# Patient Record
Sex: MALE | Race: ASIAN | NOT HISPANIC OR LATINO | URBAN - METROPOLITAN AREA
[De-identification: names, ages, dates, MRNs, and addresses within clinical notes are randomized per-mention and may not be internally consistent; named-entity substitution may affect disease eponyms.]

---

## 2019-09-30 ENCOUNTER — INPATIENT (INPATIENT)
Facility: HOSPITAL | Age: 44
LOS: 9 days | Discharge: HOME | DRG: 854 | End: 2019-10-10
Attending: HOSPITALIST | Admitting: STUDENT IN AN ORGANIZED HEALTH CARE EDUCATION/TRAINING PROGRAM
Payer: COMMERCIAL

## 2019-09-30 VITALS
HEART RATE: 118 BPM | RESPIRATION RATE: 18 BRPM | WEIGHT: 175.05 LBS | TEMPERATURE: 101 F | SYSTOLIC BLOOD PRESSURE: 181 MMHG | HEIGHT: 70 IN | DIASTOLIC BLOOD PRESSURE: 131 MMHG | OXYGEN SATURATION: 98 %

## 2019-09-30 DIAGNOSIS — L03.90 CELLULITIS, UNSPECIFIED: ICD-10-CM

## 2019-09-30 LAB
ALBUMIN SERPL ELPH-MCNC: 4.6 G/DL — SIGNIFICANT CHANGE UP (ref 3.3–5)
ALP SERPL-CCNC: 76 U/L — SIGNIFICANT CHANGE UP (ref 40–120)
ALT FLD-CCNC: 21 U/L — SIGNIFICANT CHANGE UP (ref 10–45)
ANION GAP SERPL CALC-SCNC: 13 MMOL/L — SIGNIFICANT CHANGE UP (ref 5–17)
APPEARANCE UR: CLEAR — SIGNIFICANT CHANGE UP
APTT BLD: 28.6 SEC — SIGNIFICANT CHANGE UP (ref 27.5–36.3)
AST SERPL-CCNC: 26 U/L — SIGNIFICANT CHANGE UP (ref 10–40)
BASOPHILS # BLD AUTO: 0.1 K/UL — SIGNIFICANT CHANGE UP (ref 0–0.2)
BASOPHILS NFR BLD AUTO: 0 % — SIGNIFICANT CHANGE UP (ref 0–2)
BILIRUB SERPL-MCNC: 1.3 MG/DL — HIGH (ref 0.2–1.2)
BILIRUB UR-MCNC: NEGATIVE — SIGNIFICANT CHANGE UP
BUN SERPL-MCNC: 12 MG/DL — SIGNIFICANT CHANGE UP (ref 7–23)
CALCIUM SERPL-MCNC: 9.9 MG/DL — SIGNIFICANT CHANGE UP (ref 8.4–10.5)
CHLORIDE SERPL-SCNC: 97 MMOL/L — SIGNIFICANT CHANGE UP (ref 96–108)
CO2 SERPL-SCNC: 22 MMOL/L — SIGNIFICANT CHANGE UP (ref 22–31)
COLOR SPEC: YELLOW — SIGNIFICANT CHANGE UP
CREAT SERPL-MCNC: 1.1 MG/DL — SIGNIFICANT CHANGE UP (ref 0.5–1.3)
DIFF PNL FLD: ABNORMAL
EOSINOPHIL # BLD AUTO: 0 K/UL — SIGNIFICANT CHANGE UP (ref 0–0.5)
EOSINOPHIL NFR BLD AUTO: 0 % — SIGNIFICANT CHANGE UP (ref 0–6)
GAS PNL BLDV: SIGNIFICANT CHANGE UP
GLUCOSE SERPL-MCNC: 117 MG/DL — HIGH (ref 70–99)
GLUCOSE UR QL: NEGATIVE — SIGNIFICANT CHANGE UP
HCT VFR BLD CALC: 41.9 % — SIGNIFICANT CHANGE UP (ref 39–50)
HGB BLD-MCNC: 13.4 G/DL — SIGNIFICANT CHANGE UP (ref 13–17)
INR BLD: 1.24 RATIO — HIGH (ref 0.88–1.16)
KETONES UR-MCNC: NEGATIVE — SIGNIFICANT CHANGE UP
LEUKOCYTE ESTERASE UR-ACNC: NEGATIVE — SIGNIFICANT CHANGE UP
LYMPHOCYTES # BLD AUTO: 0.7 K/UL — LOW (ref 1–3.3)
LYMPHOCYTES # BLD AUTO: 1 % — LOW (ref 13–44)
MCHC RBC-ENTMCNC: 19.7 PG — LOW (ref 27–34)
MCHC RBC-ENTMCNC: 31.9 GM/DL — LOW (ref 32–36)
MCV RBC AUTO: 61.9 FL — LOW (ref 80–100)
MONOCYTES # BLD AUTO: 0.4 K/UL — SIGNIFICANT CHANGE UP (ref 0–0.9)
MONOCYTES NFR BLD AUTO: 1 % — LOW (ref 2–14)
NEUTROPHILS # BLD AUTO: 21.3 K/UL — HIGH (ref 1.8–7.4)
NEUTROPHILS NFR BLD AUTO: 94 % — HIGH (ref 43–77)
NITRITE UR-MCNC: NEGATIVE — SIGNIFICANT CHANGE UP
PH UR: 7 — SIGNIFICANT CHANGE UP (ref 5–8)
PLATELET # BLD AUTO: 216 K/UL — SIGNIFICANT CHANGE UP (ref 150–400)
POTASSIUM SERPL-MCNC: 4.1 MMOL/L — SIGNIFICANT CHANGE UP (ref 3.5–5.3)
POTASSIUM SERPL-SCNC: 4.1 MMOL/L — SIGNIFICANT CHANGE UP (ref 3.5–5.3)
PROT SERPL-MCNC: 9.1 G/DL — HIGH (ref 6–8.3)
PROT UR-MCNC: ABNORMAL
PROTHROM AB SERPL-ACNC: 14.3 SEC — HIGH (ref 10–12.9)
RBC # BLD: 6.77 M/UL — HIGH (ref 4.2–5.8)
RBC # FLD: 14.5 % — SIGNIFICANT CHANGE UP (ref 10.3–14.5)
SODIUM SERPL-SCNC: 132 MMOL/L — LOW (ref 135–145)
SP GR SPEC: 1.02 — SIGNIFICANT CHANGE UP (ref 1.01–1.02)
UROBILINOGEN FLD QL: ABNORMAL
WBC # BLD: 22.5 K/UL — HIGH (ref 3.8–10.5)
WBC # FLD AUTO: 22.5 K/UL — HIGH (ref 3.8–10.5)

## 2019-09-30 PROCEDURE — 70487 CT MAXILLOFACIAL W/DYE: CPT | Mod: 26

## 2019-09-30 PROCEDURE — 93010 ELECTROCARDIOGRAM REPORT: CPT

## 2019-09-30 PROCEDURE — 99285 EMERGENCY DEPT VISIT HI MDM: CPT

## 2019-09-30 RX ORDER — VANCOMYCIN HCL 1 G
1000 VIAL (EA) INTRAVENOUS ONCE
Refills: 0 | Status: COMPLETED | OUTPATIENT
Start: 2019-09-30 | End: 2019-09-30

## 2019-09-30 RX ORDER — SODIUM CHLORIDE 9 MG/ML
1000 INJECTION INTRAMUSCULAR; INTRAVENOUS; SUBCUTANEOUS ONCE
Refills: 0 | Status: COMPLETED | OUTPATIENT
Start: 2019-09-30 | End: 2019-09-30

## 2019-09-30 RX ORDER — ACETAMINOPHEN 500 MG
975 TABLET ORAL ONCE
Refills: 0 | Status: COMPLETED | OUTPATIENT
Start: 2019-09-30 | End: 2019-09-30

## 2019-09-30 RX ADMIN — Medication 975 MILLIGRAM(S): at 20:13

## 2019-09-30 RX ADMIN — SODIUM CHLORIDE 1000 MILLILITER(S): 9 INJECTION INTRAMUSCULAR; INTRAVENOUS; SUBCUTANEOUS at 22:03

## 2019-09-30 RX ADMIN — SODIUM CHLORIDE 1000 MILLILITER(S): 9 INJECTION INTRAMUSCULAR; INTRAVENOUS; SUBCUTANEOUS at 21:05

## 2019-09-30 RX ADMIN — SODIUM CHLORIDE 1000 MILLILITER(S): 9 INJECTION INTRAMUSCULAR; INTRAVENOUS; SUBCUTANEOUS at 23:00

## 2019-09-30 RX ADMIN — Medication 1000 MILLIGRAM(S): at 23:00

## 2019-09-30 RX ADMIN — SODIUM CHLORIDE 1000 MILLILITER(S): 9 INJECTION INTRAMUSCULAR; INTRAVENOUS; SUBCUTANEOUS at 20:14

## 2019-09-30 RX ADMIN — Medication 975 MILLIGRAM(S): at 20:00

## 2019-09-30 RX ADMIN — Medication 250 MILLIGRAM(S): at 22:05

## 2019-09-30 NOTE — ED ADULT TRIAGE NOTE - CHIEF COMPLAINT QUOTE
upper lip swelling since this morning that has gotten progressively worse. Sent in by urgent care for possible infection.

## 2019-09-30 NOTE — ED PROVIDER NOTE - CLINICAL SUMMARY MEDICAL DECISION MAKING FREE TEXT BOX
Left upper lip swelling tenderness concerns for abscess., ct, labs, culures, reassess  Didier Nicholas MD, Facep Left upper lip swelling tenderness concerns for abscess., ct, labs, cultures, reassess  Didier Nicholas MD, Facep

## 2019-09-30 NOTE — ED PROVIDER NOTE - PHYSICAL EXAMINATION
Gen: Well appearing, NAD  Head: NCAT  HEENT: PERRL, MMM, normal conjunctiva, anicteric, neck supple, oropharynx clear  Lung: CTAB, no adventitious sounds  CV: RRR, no murmurs  Abd: soft, NTND, no rebound or guarding, no CVAT  MSK: No edema, no visible deformities  Neuro: Moving all extremity grossly  Skin: L upper lip significantly swollen, indurated, with appreciable fluctuance not tracking into face or rest of oropharynx  Psych: normal mood and affect

## 2019-09-30 NOTE — ED PROVIDER NOTE - ATTENDING CONTRIBUTION TO CARE
Private Physician Miguel Angel Zacarias Stockwell,  not staff  44y male pmh Hypertension,HLD,no dm,habits,cancer,recent travel. Pt comes to ed complains of swelling to left upper lip, "I thought it was a mouth sore:" today worsened and had tactile temp and chills. Seen urgent care and referred to ed. No stridor, Pe well developed and well nourished male with left upper lip swelling tenderness/firm, chest clear anterior & posterior abd soft +bs no mass guarding. CV no rubs, gallops or murmurs, neruo no focal defects.   Didier Nicholas MD, Facep

## 2019-09-30 NOTE — CHART NOTE - NSCHARTNOTEFT_GEN_A_CORE
Zoya Delarosa PGY-2  MAR  Dept. Internal Medicine    TO BE COMPLETED WITHIN 6 HOURS OF INITIAL ASSESSMENT:    For use in patients that have 2 sepsis criteria and new organ dysfunction   •	New or increased oxygen requirement  •	Creatinine >2mg/dL  •	Bilirubin>2mg/dL  •	Platelet <100,00/mm3  •	INR >1.5, PTT>60  •	Lactate >2    If patient persistent hypotension (SBP<90) or any lactate >4 then provider evaluation (Physician/PA/NP) within 30 minutes of bolus completion is required.    Vital Signs Last 24 Hrs  T(C): 36.9 (30 Sep 2019 20:17), Max: 38.5 (30 Sep 2019 17:03)  T(F): 98.5 (30 Sep 2019 20:17), Max: 101.3 (30 Sep 2019 17:03)  HR: 107 (30 Sep 2019 20:17) (107 - 118)  BP: 177/112 (30 Sep 2019 20:17) (169/121 - 181/131)  RR: 17 (30 Sep 2019 20:17) (16 - 18)  SpO2: 100% (30 Sep 2019 20:17) (98% - 100%)  		  LUNGS:  [x  ]Clear bilaterally [  ] Wheeze [  ] Rhonchi [  ] Rales [  ] Crackles; Other:  HEART: [  ]RRR [  ] No murmur[ x ]  Normal S1S2[ x ] Tachycardia;  Other:  CAPILLARY REFULL:  	Fingers: [  x] less than 2 seconds [  ] more than 2 seconds                                           Toes: [x  ]  less than 2 seconds [  ] more than 2 seconds   PERIPHERAL PULSES:  Radial: [ x ] Palpable  [  ]  non-palpable                                         Dorsalis Pedis: [ x ] Palpable  [  ] non-palpable                                         Posterior Tibial: [ x ] Palpable  [  ] non-palpable                                          Other:  SKIN:   [  ]  Diaphoretic  [  ]  mottling  [  ]  Cold extremities  [x  ]  Warm [ x ]  Dry                      Other:    BEDSIDE ULTRASOUND FINDINGS (IF APPLICABLE):    Labs:  30 Sep 2019 19:20    132    |  97     |  12     ----------------------------<  117    4.1     |  22     |  1.10     Ca    9.9        30 Sep 2019 19:20    TPro  9.1    /  Alb  4.6    /  TBili  1.3    /  DBili  x      /  AST  26     /  ALT  21     /  AlkPhos  76     30 Sep 2019 19:20                          13.4   22.5  )-----------( 216      ( 30 Sep 2019 19:20 )             41.9     PT/INR - ( 30 Sep 2019 20:18 )   PT: 14.3 sec;   INR: 1.24 ratio         PTT - ( 30 Sep 2019 20:18 )  PTT:28.6 sec  Lactate: 1.2    Plan (orders must be placed in EMR):     [  ]  Check Repeat Lactate   [ x ]  No change in current plan  [  ]  Start Vasopressors:  [  ]  Repeat Fluid Bolus:  [  ] other:    Care Discussed with Consultants/Other Providers [x ] YES  [ ] NO

## 2019-09-30 NOTE — ED PROVIDER NOTE - OBJECTIVE STATEMENT
45yo M hx htn p/w 24 hours of L upper lip swelling, redness, and pain and fevers tmax 103. [facility]. No hx of prior sx, denies drooling, voice changes, throat swelling. Thinks pain is radiating up into face. No blurry vision.

## 2019-09-30 NOTE — ED ADULT NURSE NOTE - OBJECTIVE STATEMENT
44y m pt c/o swollen top lip; pt states started small when woke yesterday; put oragel on it; today woke and twice as big; went to urgent care sent to er to r/o infection; pt did not take hypertension meds today; pt denies headache or vision changes; aox3; no resp distress; no diff swallow; no chest pain; no abd pain; no n/v/d; pt denies chills; not sure if has had fevers; iv placed; labs drawn per md order; family at bedside; pt to ct scan

## 2019-10-01 DIAGNOSIS — I10 ESSENTIAL (PRIMARY) HYPERTENSION: ICD-10-CM

## 2019-10-01 DIAGNOSIS — R50.81 FEVER PRESENTING WITH CONDITIONS CLASSIFIED ELSEWHERE: ICD-10-CM

## 2019-10-01 DIAGNOSIS — J45.909 UNSPECIFIED ASTHMA, UNCOMPLICATED: ICD-10-CM

## 2019-10-01 DIAGNOSIS — K13.70 UNSPECIFIED LESIONS OF ORAL MUCOSA: ICD-10-CM

## 2019-10-01 DIAGNOSIS — Z51.81 ENCOUNTER FOR THERAPEUTIC DRUG LEVEL MONITORING: ICD-10-CM

## 2019-10-01 DIAGNOSIS — Z29.9 ENCOUNTER FOR PROPHYLACTIC MEASURES, UNSPECIFIED: ICD-10-CM

## 2019-10-01 DIAGNOSIS — L03.90 CELLULITIS, UNSPECIFIED: ICD-10-CM

## 2019-10-01 DIAGNOSIS — D72.829 ELEVATED WHITE BLOOD CELL COUNT, UNSPECIFIED: ICD-10-CM

## 2019-10-01 DIAGNOSIS — K13.0 DISEASES OF LIPS: ICD-10-CM

## 2019-10-01 LAB
CULTURE RESULTS: SIGNIFICANT CHANGE UP
GRAM STN FLD: SIGNIFICANT CHANGE UP
HIV 1+2 AB+HIV1 P24 AG SERPL QL IA: SIGNIFICANT CHANGE UP
LYME C6 AB IGG/IGM EIA REFLEX WESTERN BL: SIGNIFICANT CHANGE UP
METHOD TYPE: SIGNIFICANT CHANGE UP
MRSA SPEC QL CULT: SIGNIFICANT CHANGE UP
RHEUMATOID FACT SERPL-ACNC: 17 IU/ML — HIGH (ref 0–13)
SPECIMEN SOURCE: SIGNIFICANT CHANGE UP

## 2019-10-01 PROCEDURE — 99223 1ST HOSP IP/OBS HIGH 75: CPT | Mod: GC

## 2019-10-01 PROCEDURE — 99223 1ST HOSP IP/OBS HIGH 75: CPT

## 2019-10-01 PROCEDURE — 12345: CPT | Mod: NC,GC

## 2019-10-01 RX ORDER — FENOFIBRATE,MICRONIZED 130 MG
145 CAPSULE ORAL DAILY
Refills: 0 | Status: DISCONTINUED | OUTPATIENT
Start: 2019-10-01 | End: 2019-10-10

## 2019-10-01 RX ORDER — ACYCLOVIR SODIUM 500 MG
370 VIAL (EA) INTRAVENOUS ONCE
Refills: 0 | Status: COMPLETED | OUTPATIENT
Start: 2019-10-01 | End: 2019-10-01

## 2019-10-01 RX ORDER — TRAMADOL HYDROCHLORIDE 50 MG/1
50 TABLET ORAL EVERY 6 HOURS
Refills: 0 | Status: DISCONTINUED | OUTPATIENT
Start: 2019-10-01 | End: 2019-10-01

## 2019-10-01 RX ORDER — VANCOMYCIN HCL 1 G
1250 VIAL (EA) INTRAVENOUS EVERY 12 HOURS
Refills: 0 | Status: DISCONTINUED | OUTPATIENT
Start: 2019-10-01 | End: 2019-10-02

## 2019-10-01 RX ORDER — ACETAMINOPHEN 500 MG
650 TABLET ORAL EVERY 6 HOURS
Refills: 0 | Status: DISCONTINUED | OUTPATIENT
Start: 2019-10-01 | End: 2019-10-01

## 2019-10-01 RX ORDER — AMPICILLIN SODIUM AND SULBACTAM SODIUM 250; 125 MG/ML; MG/ML
INJECTION, POWDER, FOR SUSPENSION INTRAMUSCULAR; INTRAVENOUS
Refills: 0 | Status: DISCONTINUED | OUTPATIENT
Start: 2019-10-01 | End: 2019-10-02

## 2019-10-01 RX ORDER — VANCOMYCIN HCL 1 G
1000 VIAL (EA) INTRAVENOUS EVERY 12 HOURS
Refills: 0 | Status: DISCONTINUED | OUTPATIENT
Start: 2019-10-01 | End: 2019-10-01

## 2019-10-01 RX ORDER — SODIUM CHLORIDE 9 MG/ML
1000 INJECTION INTRAMUSCULAR; INTRAVENOUS; SUBCUTANEOUS
Refills: 0 | Status: DISCONTINUED | OUTPATIENT
Start: 2019-10-01 | End: 2019-10-08

## 2019-10-01 RX ORDER — AMPICILLIN SODIUM AND SULBACTAM SODIUM 250; 125 MG/ML; MG/ML
3 INJECTION, POWDER, FOR SUSPENSION INTRAMUSCULAR; INTRAVENOUS EVERY 6 HOURS
Refills: 0 | Status: DISCONTINUED | OUTPATIENT
Start: 2019-10-01 | End: 2019-10-02

## 2019-10-01 RX ORDER — ACYCLOVIR SODIUM 500 MG
VIAL (EA) INTRAVENOUS
Refills: 0 | Status: DISCONTINUED | OUTPATIENT
Start: 2019-10-01 | End: 2019-10-02

## 2019-10-01 RX ORDER — ACYCLOVIR SODIUM 500 MG
400 VIAL (EA) INTRAVENOUS THREE TIMES A DAY
Refills: 0 | Status: DISCONTINUED | OUTPATIENT
Start: 2019-10-01 | End: 2019-10-01

## 2019-10-01 RX ORDER — FENOFIBRATE,MICRONIZED 130 MG
1 CAPSULE ORAL
Qty: 0 | Refills: 0 | DISCHARGE

## 2019-10-01 RX ORDER — ACETAMINOPHEN 500 MG
650 TABLET ORAL EVERY 6 HOURS
Refills: 0 | Status: DISCONTINUED | OUTPATIENT
Start: 2019-10-01 | End: 2019-10-10

## 2019-10-01 RX ORDER — PIPERACILLIN AND TAZOBACTAM 4; .5 G/20ML; G/20ML
3.38 INJECTION, POWDER, LYOPHILIZED, FOR SOLUTION INTRAVENOUS ONCE
Refills: 0 | Status: COMPLETED | OUTPATIENT
Start: 2019-10-01 | End: 2019-10-01

## 2019-10-01 RX ORDER — LISINOPRIL 2.5 MG/1
20 TABLET ORAL DAILY
Refills: 0 | Status: DISCONTINUED | OUTPATIENT
Start: 2019-10-01 | End: 2019-10-01

## 2019-10-01 RX ORDER — PIPERACILLIN AND TAZOBACTAM 4; .5 G/20ML; G/20ML
3.38 INJECTION, POWDER, LYOPHILIZED, FOR SOLUTION INTRAVENOUS EVERY 8 HOURS
Refills: 0 | Status: DISCONTINUED | OUTPATIENT
Start: 2019-10-01 | End: 2019-10-01

## 2019-10-01 RX ORDER — INFLUENZA VIRUS VACCINE 15; 15; 15; 15 UG/.5ML; UG/.5ML; UG/.5ML; UG/.5ML
0.5 SUSPENSION INTRAMUSCULAR ONCE
Refills: 0 | Status: COMPLETED | OUTPATIENT
Start: 2019-10-01 | End: 2019-10-01

## 2019-10-01 RX ORDER — ACYCLOVIR SODIUM 500 MG
370 VIAL (EA) INTRAVENOUS EVERY 8 HOURS
Refills: 0 | Status: DISCONTINUED | OUTPATIENT
Start: 2019-10-01 | End: 2019-10-02

## 2019-10-01 RX ORDER — IBUPROFEN 200 MG
600 TABLET ORAL EVERY 6 HOURS
Refills: 0 | Status: DISCONTINUED | OUTPATIENT
Start: 2019-10-01 | End: 2019-10-06

## 2019-10-01 RX ORDER — AMPICILLIN SODIUM AND SULBACTAM SODIUM 250; 125 MG/ML; MG/ML
3 INJECTION, POWDER, FOR SUSPENSION INTRAMUSCULAR; INTRAVENOUS ONCE
Refills: 0 | Status: COMPLETED | OUTPATIENT
Start: 2019-10-01 | End: 2019-10-01

## 2019-10-01 RX ORDER — AMLODIPINE BESYLATE 2.5 MG/1
5 TABLET ORAL DAILY
Refills: 0 | Status: DISCONTINUED | OUTPATIENT
Start: 2019-10-01 | End: 2019-10-04

## 2019-10-01 RX ADMIN — Medication 166.67 MILLIGRAM(S): at 21:10

## 2019-10-01 RX ADMIN — Medication 600 MILLIGRAM(S): at 20:15

## 2019-10-01 RX ADMIN — AMLODIPINE BESYLATE 5 MILLIGRAM(S): 2.5 TABLET ORAL at 02:56

## 2019-10-01 RX ADMIN — SODIUM CHLORIDE 100 MILLILITER(S): 9 INJECTION INTRAMUSCULAR; INTRAVENOUS; SUBCUTANEOUS at 07:25

## 2019-10-01 RX ADMIN — Medication 0.1 MILLIGRAM(S): at 02:56

## 2019-10-01 RX ADMIN — Medication 650 MILLIGRAM(S): at 06:14

## 2019-10-01 RX ADMIN — LISINOPRIL 20 MILLIGRAM(S): 2.5 TABLET ORAL at 06:09

## 2019-10-01 RX ADMIN — Medication 650 MILLIGRAM(S): at 17:00

## 2019-10-01 RX ADMIN — SODIUM CHLORIDE 75 MILLILITER(S): 9 INJECTION INTRAMUSCULAR; INTRAVENOUS; SUBCUTANEOUS at 02:38

## 2019-10-01 RX ADMIN — Medication 145 MILLIGRAM(S): at 18:50

## 2019-10-01 RX ADMIN — Medication 250 MILLIGRAM(S): at 10:50

## 2019-10-01 RX ADMIN — PIPERACILLIN AND TAZOBACTAM 200 GRAM(S): 4; .5 INJECTION, POWDER, LYOPHILIZED, FOR SOLUTION INTRAVENOUS at 04:34

## 2019-10-01 RX ADMIN — AMPICILLIN SODIUM AND SULBACTAM SODIUM 200 GRAM(S): 250; 125 INJECTION, POWDER, FOR SUSPENSION INTRAMUSCULAR; INTRAVENOUS at 23:47

## 2019-10-01 RX ADMIN — Medication 600 MILLIGRAM(S): at 07:45

## 2019-10-01 RX ADMIN — Medication 650 MILLIGRAM(S): at 03:04

## 2019-10-01 RX ADMIN — AMPICILLIN SODIUM AND SULBACTAM SODIUM 200 GRAM(S): 250; 125 INJECTION, POWDER, FOR SUSPENSION INTRAMUSCULAR; INTRAVENOUS at 16:39

## 2019-10-01 RX ADMIN — Medication 107.4 MILLIGRAM(S): at 06:13

## 2019-10-01 RX ADMIN — Medication 107.4 MILLIGRAM(S): at 18:50

## 2019-10-01 RX ADMIN — Medication 650 MILLIGRAM(S): at 18:05

## 2019-10-01 RX ADMIN — Medication 600 MILLIGRAM(S): at 19:47

## 2019-10-01 RX ADMIN — Medication 0.1 MILLIGRAM(S): at 17:00

## 2019-10-01 NOTE — PROGRESS NOTE ADULT - PROBLEM SELECTOR PLAN 2
- C/w Clonidine 0.1 mg po daily  - c/w Amlodipine 5mg PO daily  - c/w lisinopril 20mg po daily - Clonidine 0.1 mg PO BID up from qd  - c/w Amlodipine 5mg PO daily  - d/c lisinopril 20mg po daily due to potential reaction to ACE inhibitor resulting in angioedema

## 2019-10-01 NOTE — H&P ADULT - PROBLEM SELECTOR PLAN 1
Met sepsis criteria with leukocytosis, fevers and source of cellulitis of the upper labia  CT oral maxofacial with Mild left facial soft tissue swelling concerning for cellulitis no evidence of abscess.   Currently protecting airway, low concern for compromise  - s/p Vancomycin in ED, c/w vancomycin 1g Q12   - Met sepsis criteria with leukocytosis, fevers and source of cellulitis of the upper labia  CT oral maxofacial with Mild left facial soft tissue swelling concerning for cellulitis no evidence of abscess.   Currently protecting airway, low concern for compromise  - s/p Vancomycin in ED, c/w vancomycin 1g Q12   - will add zosyn, can consider deescalation to Augmentin   - f/u BCx Met sepsis criteria with leukocytosis, fevers and source of  non-purulent cellulitis of the upper labia, likely due to superinfection of mouth sore   CT oral maxofacial with Mild left facial soft tissue swelling concerning for cellulitis no evidence of abscess.   Currently protecting airway, low concern for compromise  - s/p Vancomycin in ED, c/w vancomycin 1g Q12   - No strep coverage would consider broadening with Augmentin   - f/u BCx Met sepsis criteria with leukocytosis, fevers and source of  non-purulent cellulitis of the upper labia, likely due to superinfection of mouth sore   CT oral maxofacial with Mild left facial soft tissue swelling concerning for cellulitis no evidence of abscess.   Currently protecting airway, low concern for compromise  - s/p Vancomycin in ED, c/w vancomycin 1g Q12   - f/u BCx Met sepsis criteria with leukocytosis, fevers and source of  non-purulent cellulitis of the upper labia, likely due to superinfection of mouth sore   CT oral maxofacial with Mild left facial soft tissue swelling concerning for cellulitis no evidence of abscess.   Currently protecting airway, low concern for compromise  - s/p Vancomycin in ED, c/w vancomycin 1g Q12   - will add Zosyn for anerobic coverage  - Oral maxofacial eval given free air and possible obscurity from dental hardware   - f/u BCx Met sepsis criteria with leukocytosis, fevers and source of  non-purulent cellulitis of the upper labia, likely due to superinfection from mouth sore   CT oral maxofacial with Mild left facial soft tissue swelling concerning for cellulitis no evidence of abscess.   Currently protecting airway, low concern for compromise  - s/p Vancomycin in ED, c/w vancomycin 1g Q12   - will add Zosyn for anerobic coverage (unclear if dental origin)  - Oral maxofacial eval given free air and possible obscurity from dental hardware   - f/u BCx  - although can't appreciate any vesicular lesion, will start patient on IV acyclovir as well  - unclear if oral mucousal lesions are aphthous ulcer or 2/2 autoimmune disease (i.e SLE, behcets , although no other systemic signs/symptoms present; Nevertheless, will check HIV, SOUTH, dsDNA, RF Met sepsis criteria with leukocytosis, fevers and source of  non-purulent cellulitis of the upper labia, likely due to superinfection from mouth sore   CT oral maxillofacial with Mild left facial soft tissue swelling concerning for cellulitis no evidence of abscess.   Currently protecting airway, low concern for compromise  - s/p Vancomycin in ED, c/w vancomycin 1g Q12   - will add Zosyn for anerobic coverage (unclear if dental origin)  - Oral maxofacial eval given free air and possible obscurity from dental hardware   - f/u BCx  - although can't appreciate any vesicular lesion, will start patient on IV acyclovir as well  - unclear if oral mucousal lesions are aphthous ulcer or 2/2 autoimmune disease (i.e SLE, behcets , although no other systemic signs/symptoms present; Nevertheless, will check HIV, SOUTH, dsDNA, RF, lyme serologies (even though no reported tick bite) Met sepsis criteria with leukocytosis, fevers and source of  non-purulent cellulitis of the upper labia, likely due to superinfection from mouth sore   CT oral maxillofacial with Mild left facial soft tissue swelling concerning for cellulitis no evidence of abscess.   Currently protecting airway, low concern for compromise  - s/p Vancomycin in ED, c/w vancomycin 1g Q12   - will add Zosyn for anerobic coverage (unclear if dental origin)  - Oral maxofacial eval given free air and possible obscurity from dental hardware   - f/u BCx  - although can't appreciate any vesicular lesion, will start patient on IV acyclovir as well  - unclear if oral mucousal lesions are aphthous ulcer or 2/2 autoimmune disease (i.e SLE, behcets ) , although no other systemic signs/symptoms present; Nevertheless, will check HIV, SOUTH, dsDNA, RF, lyme serologies (even though no reported tick bite)  ID consult Met sepsis criteria with leukocytosis, fevers and source of  non-purulent cellulitis of the upper labia, likely due to superinfection from mouth sore   CT oral maxillofacial with Mild left facial soft tissue swelling concerning for cellulitis no evidence of abscess.   Currently protecting airway, low concern for compromise  - s/p Vancomycin in ED, c/w vancomycin 1g Q12   - will add Zosyn for anerobic coverage (unclear if dental origin)  - Oral maxofacial eval given free air and possible obscurity from dental hardware   - f/u BCx  - although can't appreciate any vesicular lesion, will start patient on IV acyclovir as well  - unclear if oral mucousal lesions are aphthous ulcer or 2/2 autoimmune disease (i.e SLE, behcets ) , although no other systemic signs/symptoms present; Nevertheless, will check HIV, SOUTH, dsDNA, RF, lyme serologies (even though no reported tick bite)  ID consult in AM

## 2019-10-01 NOTE — CONSULT NOTE ADULT - ATTENDING COMMENTS
Cirilo Man  Attending Physician   Division of Infectious Disease  Pager #[phone]  After 5pm/weekend or no response, call #[phone]

## 2019-10-01 NOTE — PROGRESS NOTE ADULT - SUBJECTIVE AND OBJECTIVE BOX
ALPHONSO MORRISSEY  44y Male    Medicine Team 2  Iona Delatorre MS4  Page [phone]    INTERVAL HPI/OVERNIGHT EVENTS: Pt states that he slept as well as he could in the ED. He reports lip "discomfort" that is like a "dull pressure" rated 7/10. After taking Motrin, the pain was reduced to a 4-5/10. He has not noticed anything draining from the lip. He believes the lip swelling is caused to his "decreased immune system due to stress" between working at [employer] and having 2 children ages 4 and 7. He has never had lip swelling this severe before. He believes his mother may have had a similar presentation and plans to contact her today. He denies any pain or problems with his mouth or tongue. He is able to drink without difficulty. He still eats, but has some pain due to the swollen lip. He denies any pain or subjective fever now, but said when the lip started swelling around  he had joint pains in his knees and elbows but did not notice an associated rash. He denies any sick contacts. His recent travel was to the [facility] over a month ago for 2 weeks.       REVIEW OF SYSTEMS:  CONSTITUTIONAL: No fever, weight loss, or fatigue  EYES: No eye pain, visual disturbances, or discharge  ENMT:  No difficulty hearing, tinnitus, vertigo; No sinus or throat pain, +swollen lip described as a "discomfort" and "dull pressure"   NECK: No pain or stiffness  RESPIRATORY: No cough or wheezing; No shortness of breath  CARDIOVASCULAR: No chest pain, palpitations, dizziness, or leg swelling  GASTROINTESTINAL: No abdominal or epigastric pain. No diarrhea or constipation.  GENITOURINARY: No dysuria, frequency, hematuria, or incontinence  NEUROLOGICAL: No headaches, memory loss, loss of strength, numbness, or tremors  SKIN: No itching, burning, rashes, or lesions   MUSCULOSKELETAL: No joint pain or swelling; No muscle, back, or extremity pain  ALLERGY AND IMMUNOLOGIC: No hives or eczema    FAMILY HISTORY:  Mother had history of swollen lip   Father had history of HTN     Vital Signs Last 24 Hrs  T(C): 37.5 (01 Oct 2019 07:38), Max: 38.5 (30 Sep 2019 17:03)  T(F): 99.5 (01 Oct 2019 07:38), Max: 101.3 (30 Sep 2019 17:03)  HR: 123 (01 Oct 2019 07:38) (107 - 123)  BP: 148/101 (01 Oct 2019 07:38) (148/101 - 181/131)  BP(mean): --  RR: 16 (01 Oct 2019 07:38) (16 - 18)  SpO2: 98% (01 Oct 2019 07:38) (98% - 100%)      PHYSICAL EXAM:  GENERAL: NAD, well-groomed, well-developed  HEAD:  Atraumatic, Normocephalic  EYES: EOMI,conjunctiva and sclera clear  ENMT: Moist mucous membranes, +missing upper and lower teeth on the sides, +swollen upper lip mainly on the left side crossing midline that is dark in color and hard to the touch, +white circular lesion in left corner of mouth between lips that appears to have opened   NECK: Supple, No JVD  NERVOUS SYSTEM:  Alert & Oriented X3, Good concentration; Motor Strength 5/5 B/L upper and lower extremities  CHEST/LUNG: Clear to percussion bilaterally; No rales, rhonchi, wheezing, or rubs  HEART: Regular rate and rhythm  ABDOMEN: Soft, Nontender, Nondistended; Bowel sounds present  EXTREMITIES:  2+ Peripheral Pulses, No clubbing, cyanosis, or edema  LYMPH: No lymphadenopathy noted  SKIN: No rashes or lesions    Consultant(s) Notes Reviewed:  [x ] YES  [ ] NO  Care Discussed with Consultants/Other Providers [ x] YES  [ ] NO      LABS:                        13.4   22.5  )-----------( 216      ( 30 Sep 2019 19:20 )             41.9     -30    132<L>  |  97  |  12  ----------------------------<  117<H>  4.1   |  22  |  1.10    Ca    9.9      30 Sep 2019 19:20    TPro  9.1<H>  /  Alb  4.6  /  TBili  1.3<H>  /  DBili  x   /  AST  26  /  ALT  21  /  AlkPhos  76  09-30    PT/INR - ( 30 Sep 2019 20:18 )   PT: 14.3 sec;   INR: 1.24 ratio         PTT - ( 30 Sep 2019 20:18 )  PTT:28.6 sec      Urinalysis Basic - ( 30 Sep 2019 20:18 )    Color: Yellow / Appearance: Clear / S.018 / pH: x  Gluc: x / Ketone: Negative  / Bili: Negative / Urobili: >8mg/dL   Blood: x / Protein: 30 mg/dL / Nitrite: Negative   Leuk Esterase: Negative / RBC: 11 /hpf / WBC 1 /HPF   Sq Epi: x / Non Sq Epi: 0 /hpf / Bacteria: Negative    - Rheumatoid Factor Quant Serum: 17    RADIOLOGY & ADDITIONAL TESTS:  EKG  20:57  Ventricular Rate 104 BPM, Atrial Rate 104 BPM, P-R Interval 170 ms, QRS Duration 78 ms, Q-T Interval 340 ms, QTC Calculation(Bezet) 447 ms, P Axis 38 degrees, R Axis 4 degrees, T Axis 21 degrees, Diagnosis Line SINUS TACHYCARDIA, MINIMAL VOLTAGE CRITERIA FOR LVH, MAY BE NORMAL VARIANT, BORDERLINE ECG    CT Maxillofacial w/ IV contrast  21:23  IMPRESSION:    Mild left facial soft tissue stranding, most pronounced the level of the left mandible and extending anterior to masseter muscle, concerning for cellulitis.   No discrete rim-enhancing collection identified to suggest abscess at this time. Dental hardware generates beam hardening artifact which obscures detailed evaluation.  Tiny punctate bubble of air adjacent to the left mandibular periosteum. Consider oral maxillofacial surgical consultation if there is concern for dental infection.    Imaging Personally Reviewed:  [X] YES  [ ] NO      MEDICATIONS  (STANDING):  acyclovir IVPB      acyclovir IVPB 370 milliGRAM(s) IV Intermittent every 8 hours  amLODIPine   Tablet 5 milliGRAM(s) Oral daily  cloNIDine 0.1 milliGRAM(s) Oral one time a day  fenofibrate Tablet 145 milliGRAM(s) Oral daily  lisinopril 20 milliGRAM(s) Oral daily  piperacillin/tazobactam IVPB.. 3.375 Gram(s) IV Intermittent every 8 hours  sodium chloride 0.9%. 1000 milliLiter(s) (100 mL/Hr) IV Continuous <Continuous>  vancomycin  IVPB 1000 milliGRAM(s) IV Intermittent every 12 hours    MEDICATIONS  (PRN):  acetaminophen   Tablet .. 650 milliGRAM(s) Oral every 6 hours PRN Temp greater or equal to 38C (100.4F)  ibuprofen  Tablet. 600 milliGRAM(s) Oral every 6 hours PRN Moderate Pain (4 - 6)  traMADol 50 milliGRAM(s) Oral every 6 hours PRN Severe Pain (7 - 10) ALPHONSO MORRISSEY  44y Male    Medicine Team 2  Iona Delatorre MS4  Page [phone]    INTERVAL HPI/OVERNIGHT EVENTS: Pt states that he slept as well as he could in the ED. He reports lip "discomfort" that is like a "dull pressure" rated 7/10. After taking Motrin, the pain was reduced to a 4-5/10. He has not noticed anything draining from the lip. He believes the lip swelling is caused to his "decreased immune system due to stress" between working at [employer] and having 2 children ages 4 and 7. He has never had lip swelling this severe before. He believes his mother may have had a similar presentation and plans to contact her today. He denies any pain or problems with his mouth or tongue. He is able to drink without difficulty. He still eats, but has some pain due to the swollen lip. He denies any pain or subjective fever now, but said when the lip started swelling around  he had joint pains in his knees and elbows but did not notice an associated rash. He denies any sick contacts. His recent travel was to the [facility] over a month ago for 2 weeks.       REVIEW OF SYSTEMS:  CONSTITUTIONAL: No fever, weight loss, or fatigue  EYES: No eye pain, visual disturbances, or discharge  ENMT:  No difficulty hearing, tinnitus, vertigo; No sinus or throat pain, +swollen lip described as a "discomfort" and "dull pressure"   NECK: No pain or stiffness  RESPIRATORY: No cough or wheezing; No shortness of breath  CARDIOVASCULAR: No chest pain, palpitations, dizziness, or leg swelling  GASTROINTESTINAL: No abdominal or epigastric pain. No diarrhea or constipation.  GENITOURINARY: No dysuria, frequency, hematuria, or incontinence  NEUROLOGICAL: No headaches, memory loss, loss of strength, numbness, or tremors  SKIN: No itching, burning, rashes, or lesions   MUSCULOSKELETAL: No joint pain or swelling; No muscle, back, or extremity pain  ALLERGY AND IMMUNOLOGIC: No hives or eczema    FAMILY HISTORY:  Mother had history of swollen lip   Father had history of HTN     Vital Signs Last 24 Hrs  T(C): 37.5 (01 Oct 2019 07:38), Max: 38.5 (30 Sep 2019 17:03)  T(F): 99.5 (01 Oct 2019 07:38), Max: 101.3 (30 Sep 2019 17:03)  HR: 123 (01 Oct 2019 07:38) (107 - 123)  BP: 148/101 (01 Oct 2019 07:38) (148/101 - 181/131)  BP(mean): --  RR: 16 (01 Oct 2019 07:38) (16 - 18)  SpO2: 98% (01 Oct 2019 07:38) (98% - 100%)      PHYSICAL EXAM:  GENERAL: NAD, well-groomed, well-developed  HEAD:  Atraumatic, Normocephalic  EYES: EOMI,conjunctiva and sclera clear  ENMT: Moist mucous membranes, +missing upper and lower teeth on the sides, +swollen upper lip mainly on the left side crossing midline that is dark in color and hard to the touch, +white circular lesion in left corner of mouth between lips that appears to have opened   NECK: Supple, No JVD  NERVOUS SYSTEM:  Alert & Oriented X3, Good concentration; Motor Strength 5/5 B/L upper and lower extremities  CHEST/LUNG: Clear to percussion bilaterally; No rales, rhonchi, wheezing, or rubs  HEART: Regular rate and rhythm  ABDOMEN: Soft, Nontender, Nondistended; Bowel sounds present  EXTREMITIES:  2+ Peripheral Pulses, No clubbing, cyanosis, or edema  LYMPH: No lymphadenopathy noted  SKIN: No rashes or lesions    Consultant(s) Notes Reviewed:  [x ] YES  [ ] NO  Care Discussed with Consultants/Other Providers [ x] YES  [ ] NO      LABS:                        13.4   22.5  )-----------( 216      ( 30 Sep 2019 19:20 )             41.9     -30    132<L>  |  97  |  12  ----------------------------<  117<H>  4.1   |  22  |  1.10    Ca    9.9      30 Sep 2019 19:20    TPro  9.1<H>  /  Alb  4.6  /  TBili  1.3<H>  /  DBili  x   /  AST  26  /  ALT  21  /  AlkPhos  76  09-30    PT/INR - ( 30 Sep 2019 20:18 )   PT: 14.3 sec;   INR: 1.24 ratio         PTT - ( 30 Sep 2019 20:18 )  PTT:28.6 sec      Urinalysis Basic - ( 30 Sep 2019 20:18 )    Color: Yellow / Appearance: Clear / S.018 / pH: x  Gluc: x / Ketone: Negative  / Bili: Negative / Urobili: >8mg/dL   Blood: x / Protein: 30 mg/dL / Nitrite: Negative   Leuk Esterase: Negative / RBC: 11 /hpf / WBC 1 /HPF   Sq Epi: x / Non Sq Epi: 0 /hpf / Bacteria: Negative    - Rheumatoid Factor Quant Serum: 17    RADIOLOGY & ADDITIONAL TESTS:  EKG  20:57  Ventricular Rate 104 BPM, Atrial Rate 104 BPM, P-R Interval 170 ms, QRS Duration 78 ms, Q-T Interval 340 ms, QTC Calculation(Bezet) 447 ms, P Axis 38 degrees, R Axis 4 degrees, T Axis 21 degrees, Diagnosis Line SINUS TACHYCARDIA, MINIMAL VOLTAGE CRITERIA FOR LVH, MAY BE NORMAL VARIANT, BORDERLINE ECG    CT Maxillofacial w/ IV contrast  21:23  IMPRESSION:    Mild left facial soft tissue stranding, most pronounced the level of the left mandible and extending anterior to masseter muscle, concerning for cellulitis.   No discrete rim-enhancing collection identified to suggest abscess at this time. Dental hardware generates beam hardening artifact which obscures detailed evaluation.  Tiny punctate bubble of air adjacent to the left mandibular periosteum. Consider oral maxillofacial surgical consultation if there is concern for dental infection.    Imaging Personally Reviewed:  [X] YES  [ ] NO      MEDICATIONS  (STANDING):  acyclovir IVPB      acyclovir IVPB 370 milliGRAM(s) IV Intermittent every 8 hours  amLODIPine   Tablet 5 milliGRAM(s) Oral daily  cloNIDine 0.1 milliGRAM(s) Oral two times a day  fenofibrate Tablet 145 milliGRAM(s) Oral daily  piperacillin/tazobactam IVPB.. 3.375 Gram(s) IV Intermittent every 8 hours  sodium chloride 0.9%. 1000 milliLiter(s) (100 mL/Hr) IV Continuous <Continuous>  vancomycin  IVPB 1000 milliGRAM(s) IV Intermittent every 12 hours    MEDICATIONS  (PRN):  acetaminophen   Tablet .. 650 milliGRAM(s) Oral every 6 hours PRN Temp greater or equal to 38C (100.4F)  ibuprofen  Tablet. 600 milliGRAM(s) Oral every 6 hours PRN Moderate Pain (4 - 6)  traMADol 50 milliGRAM(s) Oral every 6 hours PRN Severe Pain (7 - 10) ALPHONSO MORRISSEY  44y Male    Medicine Team 2  Iona Delatorre MS4  Page [phone]    INTERVAL HPI/OVERNIGHT EVENTS: Pt states that he slept as well as he could in the ED. He reports lip "discomfort" that is like a "dull pressure" rated 7/10. After taking Motrin, the pain was reduced to a 4-5/10. He has not noticed anything draining from the lip. He believes the lip swelling is caused to his "decreased immune system due to stress" between working at [employer] and having 2 children ages 4 and 7. He has never had lip swelling this severe before. He believes his mother may have had a similar presentation and plans to contact her today. He denies any pain or problems with his mouth or tongue. He is able to drink without difficulty. He still eats, but has some pain due to the swollen lip. He denies any pain or subjective fever now, but said when the lip started swelling around  he had joint pains in his knees and elbows but did not notice an associated rash. He denies any sick contacts. His recent travel was to the [facility] over a month ago for 2 weeks.       REVIEW OF SYSTEMS:  CONSTITUTIONAL: No fever, weight loss, or fatigue  EYES: No eye pain, visual disturbances, or discharge  ENMT:  No difficulty hearing, tinnitus, vertigo; No sinus or throat pain, +swollen lip described as a "discomfort" and "dull pressure"   NECK: No pain or stiffness  RESPIRATORY: No cough or wheezing; No shortness of breath  CARDIOVASCULAR: No chest pain, palpitations, dizziness, or leg swelling  GASTROINTESTINAL: No abdominal or epigastric pain. No diarrhea or constipation.  GENITOURINARY: No dysuria, frequency, hematuria, or incontinence  NEUROLOGICAL: No headaches, memory loss, loss of strength, numbness, or tremors  SKIN: No itching, burning, rashes, or lesions   MUSCULOSKELETAL: No joint pain or swelling; No muscle, back, or extremity pain  ALLERGY AND IMMUNOLOGIC: No hives or eczema    FAMILY HISTORY:  Mother had history of swollen lip   Father had history of HTN     Vital Signs Last 24 Hrs  T(C): 37.5 (01 Oct 2019 07:38), Max: 38.5 (30 Sep 2019 17:03)  T(F): 99.5 (01 Oct 2019 07:38), Max: 101.3 (30 Sep 2019 17:03)  HR: 123 (01 Oct 2019 07:38) (107 - 123)  BP: 148/101 (01 Oct 2019 07:38) (148/101 - 181/131)  BP(mean): --  RR: 16 (01 Oct 2019 07:38) (16 - 18)  SpO2: 98% (01 Oct 2019 07:38) (98% - 100%)      PHYSICAL EXAM:  GENERAL: NAD, well-groomed, well-developed  HEAD:  Atraumatic, Normocephalic  EYES: EOMI,conjunctiva and sclera clear  ENMT: Moist mucous membranes, +missing upper and lower teeth on the sides, +swollen upper lip mainly on the left side crossing midline that is dark in color and hard to the touch, +white circular lesion in left corner of mouth between lips that appears to have opened   NECK: Supple, No JVD  NERVOUS SYSTEM:  Alert & Oriented X3, Good concentration; Motor Strength 5/5 B/L upper and lower extremities  CHEST/LUNG: Clear to percussion bilaterally; No rales, rhonchi, wheezing, or rubs  HEART: Regular rate and rhythm  ABDOMEN: Soft, Nontender, Nondistended; Bowel sounds present  EXTREMITIES:  2+ Peripheral Pulses, No clubbing, cyanosis, or edema  LYMPH: No lymphadenopathy noted  SKIN: No rashes or lesions    Consultant(s) Notes Reviewed:  [x ] YES  [ ] NO  Care Discussed with Consultants/Other Providers [ x] YES  [ ] NO      LABS:                        13.4   22.5  )-----------( 216      ( 30 Sep 2019 19:20 )             41.9     -30    132<L>  |  97  |  12  ----------------------------<  117<H>  4.1   |  22  |  1.10    Ca    9.9      30 Sep 2019 19:20    TPro  9.1<H>  /  Alb  4.6  /  TBili  1.3<H>  /  DBili  x   /  AST  26  /  ALT  21  /  AlkPhos  76  09-30    PT/INR - ( 30 Sep 2019 20:18 )   PT: 14.3 sec;   INR: 1.24 ratio         PTT - ( 30 Sep 2019 20:18 )  PTT:28.6 sec      Urinalysis Basic - ( 30 Sep 2019 20:18 )    Color: Yellow / Appearance: Clear / S.018 / pH: x  Gluc: x / Ketone: Negative  / Bili: Negative / Urobili: >8mg/dL   Blood: x / Protein: 30 mg/dL / Nitrite: Negative   Leuk Esterase: Negative / RBC: 11 /hpf / WBC 1 /HPF   Sq Epi: x / Non Sq Epi: 0 /hpf / Bacteria: Negative    - Rheumatoid Factor Quant Serum: 17  - HIV nonreactive    RADIOLOGY & ADDITIONAL TESTS:  EKG  20:57  Ventricular Rate 104 BPM, Atrial Rate 104 BPM, P-R Interval 170 ms, QRS Duration 78 ms, Q-T Interval 340 ms, QTC Calculation(Bezet) 447 ms, P Axis 38 degrees, R Axis 4 degrees, T Axis 21 degrees, Diagnosis Line SINUS TACHYCARDIA, MINIMAL VOLTAGE CRITERIA FOR LVH, MAY BE NORMAL VARIANT, BORDERLINE ECG    CT Maxillofacial w/ IV contrast  21:23  IMPRESSION:    Mild left facial soft tissue stranding, most pronounced the level of the left mandible and extending anterior to masseter muscle, concerning for cellulitis.   No discrete rim-enhancing collection identified to suggest abscess at this time. Dental hardware generates beam hardening artifact which obscures detailed evaluation.  Tiny punctate bubble of air adjacent to the left mandibular periosteum. Consider oral maxillofacial surgical consultation if there is concern for dental infection.    Imaging Personally Reviewed:  [X] YES  [ ] NO      MEDICATIONS  (STANDING):  acyclovir IVPB      acyclovir IVPB 370 milliGRAM(s) IV Intermittent every 8 hours  amLODIPine   Tablet 5 milliGRAM(s) Oral daily  cloNIDine 0.1 milliGRAM(s) Oral two times a day  fenofibrate Tablet 145 milliGRAM(s) Oral daily  piperacillin/tazobactam IVPB.. 3.375 Gram(s) IV Intermittent every 8 hours  sodium chloride 0.9%. 1000 milliLiter(s) (100 mL/Hr) IV Continuous <Continuous>  vancomycin  IVPB 1000 milliGRAM(s) IV Intermittent every 12 hours    MEDICATIONS  (PRN):  acetaminophen   Tablet .. 650 milliGRAM(s) Oral every 6 hours PRN Temp greater or equal to 38C (100.4F)  ibuprofen  Tablet. 600 milliGRAM(s) Oral every 6 hours PRN Moderate Pain (4 - 6)  traMADol 50 milliGRAM(s) Oral every 6 hours PRN Severe Pain (7 - 10)

## 2019-10-01 NOTE — ED ADULT NURSE REASSESSMENT NOTE - NS ED NURSE REASSESS COMMENT FT1
Dr Cueto made aware of elevated bp, hr, and temp; advised to give amlodipine and clonidine now, since pt skipped his doses yesterday.

## 2019-10-01 NOTE — CONSULT NOTE ADULT - ASSESSMENT
43 yo M with PMH of asthma and essential HTN presenting with fever, leukocytosis, oral sores with sepsis 2/2 cellulitis of the upper lip.

## 2019-10-01 NOTE — H&P ADULT - HISTORY OF PRESENT ILLNESS
Jacques Cueto MD JOHNSON  Internal Medicine PGY-2  Pager: [facility]: [phone]; [facility] [phone]    45 yo M no pertinent past medical history presents with one day of subjective fever, lip swelling, and pain.     In the ED  VS: max 101.3F, -118, -181/112-131, RR 16-18, sPO2 % RA   Received one dose of vancomycin, 2L LR bolus, Tylenol Jacques Cueto MD JOHNSON  Internal Medicine PGY-2  Pager: [facility]: [phone]; [facility] [id number]    43 yo M no pertinent past medical history presents with one day of subjective fever, lip swelling, and pain. States that he intermittently experiences "mouth sores" that go away on its own or if persistent resolve with use of oral gel. He noticed a puss filled     In the ED  VS: max 101.3F, -118, -181/112-131, RR 16-18, sPO2 % RA   Received one dose of vancomycin, 2L LR bolus, Tylenol Jacques Cueto MD JOHNSON  Internal Medicine PGY-2  Pager: [facility]: [phone]; [facility] [id number]    43 yo M no pertinent past medical history presents with one day of subjective fever, lip swelling, and pain. States that he intermittently experiences "mouth sores" that go away on its own or if persistent resolve with use of oral gel. He noticed a puss filled sore this AM, and within 6-12 hours his entire lip was swollen. He also endorses subjective fevers and chills. Denies trauma to the area, no recent dental or cosmetic procedures. Remote Root canal performed over one year ago.      In the ED  VS: max 101.3F, -118, -181/112-131, RR 16-18, sPO2 % RA   Received one dose of vancomycin, 2L LR bolus, Tylenol Jacques Cueto MD JOHNSON  Internal Medicine PGY-2  Pager: [facility]: [phone]; [place] [id number]    45 yo M no pertinent past medical history presents with one day of subjective fever, lip swelling, and pain. Patient states that he has recurrent mouth sores 2-3 times a year.  He describes the lesions as mucousal "flat", "white", "circular".  He occasionally has associated fever.  These lesions usually go away on its own or if persistent resolve with use of oral gel.  Day before yesterday he noticed a similar lesion located on the inner/corner part of left lip.  He thought it was filled with puss.  Within 6-12 hours his entire lip was swollen. He also endorses subjective fevers and chills. Denies trauma to the area, no recent dental or cosmetic procedures. Remote Root canal performed over one year ago.  Patient otherwise denies having nausea, vomiting, cough, SOB, joint pain.  He denies any GI/ symptoms.  His mother had a similar incident of lip swelling long time ago.      In the ED  VS: max 101.3F, -118, -181/112-131, RR 16-18, sPO2 % RA   Received one dose of vancomycin, 2L LR bolus, Tylenol

## 2019-10-01 NOTE — PROGRESS NOTE ADULT - PROBLEM SELECTOR PLAN 1
Met sepsis criteria with leukocytosis, fevers and source of  non-purulent cellulitis of the upper lip, likely due to superinfection from mouth sore   CT oral maxillofacial with Mild left facial soft tissue swelling concerning for cellulitis no evidence of abscess.   Currently protecting airway, low concern for compromise  - s/p Vancomycin in ED, c/w vancomycin 1g Q12   - will add Zosyn for anerobic coverage (unclear if dental origin)  - Oral maxofacial eval given free air and possible obscurity from dental hardware   - f/u BCx  - although can't appreciate any vesicular lesion, will start patient on IV acyclovir as well  - unclear if oral mucousal lesions are aphthous ulcer or 2/2 autoimmune disease (i.e SLE, behcets ) , although no other systemic signs/symptoms present; Nevertheless, will check HIV, SOUTH, dsDNA, RF, lyme serologies (even though no reported tick bite)  - ID consult in AM Met sepsis criteria with leukocytosis, fevers and source of  non-purulent cellulitis of the upper lip, likely due to superinfection from mouth sore. Also considering etiology of symptoms due to angioedema 2/2 ACE inhibitor use.   CT oral maxillofacial with Mild left facial soft tissue swelling concerning for cellulitis no evidence of abscess.   Currently protecting airway, low concern for compromise  - s/p Vancomycin in ED, c/w vancomycin 1g Q12   - will add Zosyn for anerobic coverage (unclear if dental origin)  - Oral maxofacial eval given free air and possible obscurity from dental hardware   - f/u BCx  - although can't appreciate any vesicular lesion, will start patient on IV acyclovir as well  - unclear if oral mucousal lesions are aphthous ulcer or 2/2 autoimmune disease (i.e SLE, behcets ) , although no other systemic signs/symptoms present; Nevertheless, will check HIV, SOUTH, dsDNA, RF, lyme serologies (even though no reported tick bite)  - f/u with requested ID consult Met sepsis criteria with leukocytosis, fevers and source of  non-purulent cellulitis of the upper lip, likely due to superinfection from mouth sore. Also considering etiology of symptoms due to angioedema 2/2 ACE inhibitor use.   CT oral maxillofacial with Mild left facial soft tissue swelling concerning for cellulitis no evidence of abscess.   Currently protecting airway, low concern for compromise  - s/p Vancomycin in ED, c/w vancomycin 1g Q12   - will add Zosyn for anerobic coverage (unclear if dental origin)  - Per dental, no interventions at this time.  - f/u BCx  - Given history of vesicular lesions on mouth, pt started on acyclovir  - unclear if oral mucousal lesions are aphthous ulcer or 2/2 autoimmune disease (i.e SLE, behcets ) , although no other systemic signs/symptoms present; Nevertheless, will check HIV, SOUTH, dsDNA, RF, lyme serologies (even though no reported tick bite)  - f/u with requested ID consult

## 2019-10-01 NOTE — H&P ADULT - NSHPLABSRESULTS_GEN_ALL_CORE
LABS:                        13.4   22.5  )-----------( 216      ( 30 Sep 2019 19:20 )             41.9     30 Sep 2019 19:20    132    |  97     |  12     ----------------------------<  117    4.1     |  22     |  1.10     Ca    9.9        30 Sep 2019 19:20    TPro  9.1    /  Alb  4.6    /  TBili  1.3    /  DBili  x      /  AST  26     /  ALT  21     /  AlkPhos  76     30 Sep 2019 19:20    PT/INR - ( 30 Sep 2019 20:18 )   PT: 14.3 sec;   INR: 1.24 ratio    PTT - ( 30 Sep 2019 20:18 )  PTT:28.6 sec    BLOOD CULTURE    RADIOLOGY & ADDITIONAL TESTS:  < from: CT Maxillofacial w/ IV Cont (09.30.19 @ 21:23) >      FINDINGS:    There is mild left facial soft tissue stranding, most pronounced the   level of the left mandibleand extending anterior to masseter muscle,   concerning for cellulitis. Mild lip soft tissue swelling identified. No   discrete rim-enhancing collection identified to suggest abscess at this   time. Dental hardware generates beam hardening artifact which obscures   detailed evaluation. There is tiny punctate bubble of air adjacent to the   left mandibular periosteum best seen on image 27 of series 2. Consider   oral maxillofacial surgical consultation if there is concern for dental   infection.    The parotid and submandibular glands are normal.     There are mildly prominent bilateral cervical chain lymph nodes, likely   reactive in nature.    The visualized portions of the major cervical vessels are patent though   detailed evaluation is limited secondary to suboptimal intravenous   contrast bolus.    The globes are intact. There is no post septal inflammation seen. The   orbital walls are intact. The orbits are otherwise unremarkable.    No displaced facial bone fracture is seen.    Mild chronic mucosal wall thickening of bilateral maxillary sinuses.   Remaining paranasal sinuses and mastoids are normally aerated.    IMPRESSION:    Mild left facial soft tissue stranding, most pronounced the level of the   left mandible and extending anterior to masseter muscle, concerning for   cellulitis.     No discrete rim-enhancing collection identified to suggest abscess at   this time. Dental hardware generates beam hardening artifact which   obscures detailed evaluation.  Tiny punctate bubble of air adjacent to   the left mandibular periosteum. Consider oral maxillofacial surgical   consultation if there is concern for dental infection.    < end of copied text >  Imaging Personally Reviewed:  [x] YES LABS:                        13.4   22.5  )-----------( 216      ( 30 Sep 2019 19:20 )             41.9     30 Sep 2019 19:20    132    |  97     |  12     ----------------------------<  117    4.1     |  22     |  1.10     Ca    9.9        30 Sep 2019 19:20    TPro  9.1    /  Alb  4.6    /  TBili  1.3    /  DBili  x      /  AST  26     /  ALT  21     /  AlkPhos  76     30 Sep 2019 19:20    PT/INR - ( 30 Sep 2019 20:18 )   PT: 14.3 sec;   INR: 1.24 ratio    PTT - ( 30 Sep 2019 20:18 )  PTT:28.6 sec    RADIOLOGY & ADDITIONAL TESTS:  < from: CT Maxillofacial w/ IV Cont (09.30.19 @ 21:23) >    FINDINGS:    There is mild left facial soft tissue stranding, most pronounced the   level of the left mandibleand extending anterior to masseter muscle,   concerning for cellulitis. Mild lip soft tissue swelling identified. No   discrete rim-enhancing collection identified to suggest abscess at this   time. Dental hardware generates beam hardening artifact which obscures   detailed evaluation. There is tiny punctate bubble of air adjacent to the   left mandibular periosteum best seen on image 27 of series 2. Consider   oral maxillofacial surgical consultation if there is concern for dental   infection.    The parotid and submandibular glands are normal.     There are mildly prominent bilateral cervical chain lymph nodes, likely   reactive in nature.    The visualized portions of the major cervical vessels are patent though   detailed evaluation is limited secondary to suboptimal intravenous   contrast bolus.    The globes are intact. There is no post septal inflammation seen. The   orbital walls are intact. The orbits are otherwise unremarkable.    No displaced facial bone fracture is seen.    Mild chronic mucosal wall thickening of bilateral maxillary sinuses.   Remaining paranasal sinuses and mastoids are normally aerated.    IMPRESSION:    Mild left facial soft tissue stranding, most pronounced the level of the   left mandible and extending anterior to masseter muscle, concerning for   cellulitis.     No discrete rim-enhancing collection identified to suggest abscess at   this time. Dental hardware generates beam hardening artifact which   obscures detailed evaluation.  Tiny punctate bubble of air adjacent to   the left mandibular periosteum. Consider oral maxillofacial surgical   consultation if there is concern for dental infection.    < end of copied text >  Imaging Personally Reviewed:  [x] YES LABS reviewed:                        13.4   22.5  )-----------( 216      ( 30 Sep 2019 19:20 )             41.9     30 Sep 2019 19:20    132    |  97     |  12     ----------------------------<  117    4.1     |  22     |  1.10     Ca    9.9        30 Sep 2019 19:20    TPro  9.1    /  Alb  4.6    /  TBili  1.3    /  DBili  x      /  AST  26     /  ALT  21     /  AlkPhos  76     30 Sep 2019 19:20    PT/INR - ( 30 Sep 2019 20:18 )   PT: 14.3 sec;   INR: 1.24 ratio    PTT - ( 30 Sep 2019 20:18 )  PTT:28.6 sec    RADIOLOGY & ADDITIONAL TESTS reviewed  < from: CT Maxillofacial w/ IV Cont (09.30.19 @ 21:23) >    FINDINGS:    There is mild left facial soft tissue stranding, most pronounced the   level of the left mandibleand extending anterior to masseter muscle,   concerning for cellulitis. Mild lip soft tissue swelling identified. No   discrete rim-enhancing collection identified to suggest abscess at this   time. Dental hardware generates beam hardening artifact which obscures   detailed evaluation. There is tiny punctate bubble of air adjacent to the   left mandibular periosteum best seen on image 27 of series 2. Consider   oral maxillofacial surgical consultation if there is concern for dental   infection.    The parotid and submandibular glands are normal.     There are mildly prominent bilateral cervical chain lymph nodes, likely   reactive in nature.    The visualized portions of the major cervical vessels are patent though   detailed evaluation is limited secondary to suboptimal intravenous   contrast bolus.    The globes are intact. There is no post septal inflammation seen. The   orbital walls are intact. The orbits are otherwise unremarkable.    No displaced facial bone fracture is seen.    Mild chronic mucosal wall thickening of bilateral maxillary sinuses.   Remaining paranasal sinuses and mastoids are normally aerated.    IMPRESSION:    Mild left facial soft tissue stranding, most pronounced the level of the   left mandible and extending anterior to masseter muscle, concerning for   cellulitis.     No discrete rim-enhancing collection identified to suggest abscess at   this time. Dental hardware generates beam hardening artifact which   obscures detailed evaluation.  Tiny punctate bubble of air adjacent to   the left mandibular periosteum. Consider oral maxillofacial surgical   consultation if there is concern for dental infection.    < end of copied text >      EKG reviewed: Sinus tachycardia, LVH

## 2019-10-01 NOTE — H&P ADULT - PROBLEM SELECTOR PROBLEM 3
Asthma, unspecified asthma severity, unspecified whether complicated, unspecified whether persistent Need for prophylactic measure

## 2019-10-01 NOTE — H&P ADULT - NSHPREVIEWOFSYSTEMS_GEN_ALL_CORE
CONSTITUTIONAL: No fever, weight loss, or fatigue  EYES: No eye pain, visual disturbances, or discharge  ENMT:  No difficulty hearing, tinnitus, vertigo; No sinus or throat pain  RESPIRATORY: No cough, wheezing, chills or hemoptysis; No shortness of breath  CARDIOVASCULAR: No chest pain, palpitations, dizziness, or leg swelling  GASTROINTESTINAL: No abdominal or epigastric pain. No nausea, vomiting, or hematemesis; No diarrhea or constipation. No melena or hematochezia.  GENITOURINARY: No dysuria, frequency, hematuria, or incontinence  NEUROLOGICAL: No headaches, loss of strength, numbness, or tremors  SKIN: No itching, burning, rashes, or lesions   LYMPH NODES: No enlarged glands  ENDOCRINE: No heat or cold intolerance; No polydipsia or polyuria  MUSCULOSKELETAL: No joint pain or swelling;   PSYCHIATRIC: Denies depression, anxiety  HEME/LYMPH: No easy bruising, or bleeding gums  ALLERGY AND IMMUNOLOGIC: No hives or eczema CONSTITUTIONAL: +fevers   EYES: No eye pain, visual disturbances, or discharge  ENMT: No difficulty hearing, tinnitus, vertigo; No sinus or throat pain. +Lip swelling   RESPIRATORY: No cough, wheezing, chills or hemoptysis; No shortness of breath  CARDIOVASCULAR: No chest pain, palpitations, dizziness, or leg swelling  GASTROINTESTINAL: No abdominal or epigastric pain. No nausea, vomiting, or hematemesis; No diarrhea or constipation. No melena or hematochezia.  GENITOURINARY: No dysuria, frequency, hematuria, or incontinence  NEUROLOGICAL: No headaches, loss of strength, numbness, or tremors  SKIN: No itching, burning, rashes, or lesions   LYMPH NODES: No enlarged glands  ENDOCRINE: No heat or cold intolerance; No polydipsia or polyuria  MUSCULOSKELETAL: No joint pain or swelling;   PSYCHIATRIC: Denies depression, anxiety  HEME/LYMPH: No easy bruising, or bleeding gums  ALLERGY AND IMMUNOLOGIC: No hives or eczema

## 2019-10-01 NOTE — CONSULT NOTE ADULT - SUBJECTIVE AND OBJECTIVE BOX
Patient is a 44y old  Male who presents with a chief complaint of Cellulitis (01 Oct 2019 00:24)      HPI: 45 yo M no pertinent past medical history presents with one day of subjective fever, lip swelling, and pain. Patient states that he has recurrent mouth sores 2-3 times a year.  He describes the lesions as mucousal "flat", "white", "circular".  He occasionally has associated fever.  These lesions usually go away on its own or if persistent resolve with use of oral gel.  Day before yesterday he noticed a similar lesion located on the inner/corner part of left lip.  He thought it was filled with puss.  Within 6-12 hours his entire lip was swollen. He also endorses subjective fevers and chills. Denies trauma to the area, no recent dental or cosmetic procedures. Remote Root canal performed over one year ago.  Patient otherwise denies having nausea, vomiting, cough, SOB, joint pain.  He denies any GI/ symptoms.  His mother had a similar incident of lip swelling long time ago.      In the ED  VS: max 101.3F, -118, -181/112-131, RR 16-18, sPO2 % RA   Received one dose of vancomycin, 2L LR bolus, Tylenol (01 Oct 2019 00:24)      PAST MEDICAL & SURGICAL HISTORY:  Asthma  Hypertension  No significant past surgical history    ( -  ) heart valve replacement  ( -  ) joint replacement    MEDICATIONS  (STANDING):  amLODIPine   Tablet 5 milliGRAM(s) Oral daily  cloNIDine 0.1 milliGRAM(s) Oral two times a day  fenofibrate Tablet 145 milliGRAM(s) Oral daily  lisinopril 20 milliGRAM(s) Oral daily  piperacillin/tazobactam IVPB. 3.375 Gram(s) IV Intermittent once  piperacillin/tazobactam IVPB.. 3.375 Gram(s) IV Intermittent every 8 hours  sodium chloride 0.9%. 1000 milliLiter(s) (75 mL/Hr) IV Continuous <Continuous>  vancomycin  IVPB 1000 milliGRAM(s) IV Intermittent every 12 hours    MEDICATIONS  (PRN):  acetaminophen   Tablet .. 650 milliGRAM(s) Oral every 6 hours PRN Temp greater or equal to 38C (100.4F)  ibuprofen  Tablet. 600 milliGRAM(s) Oral every 6 hours PRN Moderate Pain (4 - 6)  traMADol 50 milliGRAM(s) Oral every 6 hours PRN Severe Pain (7 - 10)      Allergies    No Known Allergies    Intolerances      FAMILY HISTORY:  No pertinent family history      SOCIAL HISTORY: Patient presents alone bedside    Last Dental Visit: Unknown    Vital Signs Last 24 Hrs  T(C): 38.2 (01 Oct 2019 02:39), Max: 38.5 (30 Sep 2019 17:03)  T(F): 100.8 (01 Oct 2019 02:39), Max: 101.3 (30 Sep 2019 17:03)  HR: 112 (01 Oct 2019 02:39) (107 - 118)  BP: 180/119 (01 Oct 2019 02:39) (169/121 - 181/131)  BP(mean): --  RR: 18 (01 Oct 2019 02:39) (16 - 18)  SpO2: 100% (01 Oct 2019 02:39) (98% - 100%)    EOE:  TMJ ( -  ) clicks                    (  -  ) pops                    (  -  ) crepitus             Mandible: Guarded MO approximately 35mm             Facial bones and MOM grossly intact             ( -  ) trismus             ( -  ) LAD             ( +  ) swelling: upper lip swelling, left side firm              ( -  ) asymmetry             ( +  ) palpation: tender to palpation of upper lip, left maxilla, and lower border of left mandible             ( -  ) SOB             ( -  ) dysphagia             ( -  ) LOC    IOE:  permanent dentition: multiple missing teeth           hard/soft palate:  ( -  ) palatal torus           tongue/FOM WNL           labial/buccal mucosa WNL           ( -  ) percussion           ( -  ) palpation           ( -  ) swelling     Dentition present: #6-11, #21-27  Mobility: No gross mobility noted  Caries: No gross decay noted    Radiographs: 1 panoramic radiograph, interpreted: no signs of acute odontogenic infection, no bony pathology noted.    LABS:                        13.4   22.5  )-----------( 216      ( 30 Sep 2019 19:20 )             41.9     09-30    132<L>  |  97  |  12  ----------------------------<  117<H>  4.1   |  22  |  1.10    Ca    9.9      30 Sep 2019 19:20    TPro  9.1<H>  /  Alb  4.6  /  TBili  1.3<H>  /  DBili  x   /  AST  26  /  ALT  21  /  AlkPhos  76      WBC Count: 22.5 K/uL <H> [3.8 - 10.5] ( @ 19:20)    Platelet Count - Automated: 216 K/uL [150 - 400] ( @ 19:20)    INR: 1.24 ratio <H> [0.88 - 1.16] ( @ 20:18)      Urinalysis Basic - ( 30 Sep 2019 20:18 )    Color: Yellow / Appearance: Clear / S.018 / pH: x  Gluc: x / Ketone: Negative  / Bili: Negative / Urobili: >8mg/dL   Blood: x / Protein: 30 mg/dL / Nitrite: Negative   Leuk Esterase: Negative / RBC: 11 /hpf / WBC 1 /HPF   Sq Epi: x / Non Sq Epi: 0 /hpf / Bacteria: Negative          RADIOLOGY & ADDITIONAL STUDIES: CT of head and neck, see impression    ASSESSMENT: Upper lip swelling and cellulitis, dental unlikely source of infection. No signs of acute odontogenic infection.    PROCEDURE: EOE/IOE, panoramic radiograph  Verbal and written consent given.     RECOMMENDATIONS:   1) <<   >>  2) Dental F/U with outpatient dentist for comprehensive dental care.   3) If any difficulty swallowing/breathing, fever occur, page dental.     Maria Elena Mello DDS and Carmen Solorio DDS, pager #[phone]  Oral surgeon consulted: Dr. Velazquez Patient is a 44y old  Male who presents with a chief complaint of Cellulitis (01 Oct 2019 00:24)      HPI: 43 yo M no pertinent past medical history presents with one day of subjective fever, lip swelling, and pain. Patient states that he has recurrent mouth sores 2-3 times a year.  He describes the lesions as mucousal "flat", "white", "circular".  He occasionally has associated fever.  These lesions usually go away on its own or if persistent resolve with use of oral gel.  Day before yesterday he noticed a similar lesion located on the inner/corner part of left lip.  He thought it was filled with puss.  Within 6-12 hours his entire lip was swollen. He also endorses subjective fevers and chills. Denies trauma to the area, no recent dental or cosmetic procedures. Remote Root canal performed over one year ago.  Patient otherwise denies having nausea, vomiting, cough, SOB, joint pain.  He denies any GI/ symptoms.  His mother had a similar incident of lip swelling long time ago.      In the ED  VS: max 101.3F, -118, -181/112-131, RR 16-18, sPO2 % RA   Received one dose of vancomycin, 2L LR bolus, Tylenol (01 Oct 2019 00:24)      PAST MEDICAL & SURGICAL HISTORY:  Asthma  Hypertension  No significant past surgical history    ( -  ) heart valve replacement  ( -  ) joint replacement    MEDICATIONS  (STANDING):  amLODIPine   Tablet 5 milliGRAM(s) Oral daily  cloNIDine 0.1 milliGRAM(s) Oral two times a day  fenofibrate Tablet 145 milliGRAM(s) Oral daily  lisinopril 20 milliGRAM(s) Oral daily  piperacillin/tazobactam IVPB. 3.375 Gram(s) IV Intermittent once  piperacillin/tazobactam IVPB.. 3.375 Gram(s) IV Intermittent every 8 hours  sodium chloride 0.9%. 1000 milliLiter(s) (75 mL/Hr) IV Continuous <Continuous>  vancomycin  IVPB 1000 milliGRAM(s) IV Intermittent every 12 hours    MEDICATIONS  (PRN):  acetaminophen   Tablet .. 650 milliGRAM(s) Oral every 6 hours PRN Temp greater or equal to 38C (100.4F)  ibuprofen  Tablet. 600 milliGRAM(s) Oral every 6 hours PRN Moderate Pain (4 - 6)  traMADol 50 milliGRAM(s) Oral every 6 hours PRN Severe Pain (7 - 10)      Allergies    No Known Allergies    Intolerances      FAMILY HISTORY:  No pertinent family history      SOCIAL HISTORY: Patient presents alone bedside    Last Dental Visit: Unknown    Vital Signs Last 24 Hrs  T(C): 38.2 (01 Oct 2019 02:39), Max: 38.5 (30 Sep 2019 17:03)  T(F): 100.8 (01 Oct 2019 02:39), Max: 101.3 (30 Sep 2019 17:03)  HR: 112 (01 Oct 2019 02:39) (107 - 118)  BP: 180/119 (01 Oct 2019 02:39) (169/121 - 181/131)  BP(mean): --  RR: 18 (01 Oct 2019 02:39) (16 - 18)  SpO2: 100% (01 Oct 2019 02:39) (98% - 100%)    EOE:  TMJ ( -  ) clicks                    (  -  ) pops                    (  -  ) crepitus             Mandible: Guarded MO approximately 35mm             Facial bones and MOM grossly intact             ( -  ) trismus             ( -  ) LAD             ( +  ) swelling: upper lip swelling, left side firm              ( -  ) asymmetry             ( +  ) palpation: tender to palpation of upper lip, left maxilla, and lower border of left mandible             ( -  ) SOB             ( -  ) dysphagia             ( -  ) LOC    IOE:  permanent dentition: multiple missing teeth           hard/soft palate:  ( -  ) palatal torus           tongue/FOM WNL           labial/buccal mucosa WNL           ( -  ) percussion           ( -  ) palpation           ( -  ) swelling     Dentition present: #6-11, #21-27  Mobility: No gross mobility noted  Caries: No gross decay noted    Radiographs: 1 panoramic radiograph, interpreted: no signs of acute odontogenic infection, no bony pathology noted.    LABS:                        13.4   22.5  )-----------( 216      ( 30 Sep 2019 19:20 )             41.9     09-30    132<L>  |  97  |  12  ----------------------------<  117<H>  4.1   |  22  |  1.10    Ca    9.9      30 Sep 2019 19:20    TPro  9.1<H>  /  Alb  4.6  /  TBili  1.3<H>  /  DBili  x   /  AST  26  /  ALT  21  /  AlkPhos  76      WBC Count: 22.5 K/uL <H> [3.8 - 10.5] ( @ 19:20)    Platelet Count - Automated: 216 K/uL [150 - 400] ( @ 19:20)    INR: 1.24 ratio <H> [0.88 - 1.16] ( @ 20:18)      Urinalysis Basic - ( 30 Sep 2019 20:18 )    Color: Yellow / Appearance: Clear / S.018 / pH: x  Gluc: x / Ketone: Negative  / Bili: Negative / Urobili: >8mg/dL   Blood: x / Protein: 30 mg/dL / Nitrite: Negative   Leuk Esterase: Negative / RBC: 11 /hpf / WBC 1 /HPF   Sq Epi: x / Non Sq Epi: 0 /hpf / Bacteria: Negative          RADIOLOGY & ADDITIONAL STUDIES: CT of head and neck, see impression    ASSESSMENT: Upper lip swelling and cellulitis, dental unlikely source of infection. No signs of acute odontogenic infection.    PROCEDURE: EOE/IOE, panoramic radiograph  Verbal and written consent given.     RECOMMENDATIONS:   1) Dental F/U with outpatient dentist for comprehensive dental care.   2) If any difficulty swallowing/breathing, fever occur, page dental.     Maria Elena Mello DDS and Carmen Solorio DDS, pager #[phone] Patient is a 44y old  Male who presents with a chief complaint of Cellulitis (01 Oct 2019 00:24)      HPI: 43 yo M no pertinent past medical history presents with one day of subjective fever, lip swelling, and pain. Patient states that he has recurrent mouth sores 2-3 times a year.  He describes the lesions as mucousal "flat", "white", "circular".  He occasionally has associated fever.  These lesions usually go away on its own or if persistent resolve with use of oral gel.  Day before yesterday he noticed a similar lesion located on the inner/corner part of left lip.  He thought it was filled with puss.  Within 6-12 hours his entire lip was swollen. He also endorses subjective fevers and chills. Denies trauma to the area, no recent dental or cosmetic procedures. Remote Root canal performed over one year ago.  Patient otherwise denies having nausea, vomiting, cough, SOB, joint pain.  He denies any GI/ symptoms.  His mother had a similar incident of lip swelling long time ago.      In the ED  VS: max 101.3F, -118, -181/112-131, RR 16-18, sPO2 % RA   Received one dose of vancomycin, 2L LR bolus, Tylenol (01 Oct 2019 00:24)      PAST MEDICAL & SURGICAL HISTORY:  Asthma  Hypertension  No significant past surgical history    ( -  ) heart valve replacement  ( -  ) joint replacement    MEDICATIONS  (STANDING):  amLODIPine   Tablet 5 milliGRAM(s) Oral daily  cloNIDine 0.1 milliGRAM(s) Oral two times a day  fenofibrate Tablet 145 milliGRAM(s) Oral daily  lisinopril 20 milliGRAM(s) Oral daily  piperacillin/tazobactam IVPB. 3.375 Gram(s) IV Intermittent once  piperacillin/tazobactam IVPB.. 3.375 Gram(s) IV Intermittent every 8 hours  sodium chloride 0.9%. 1000 milliLiter(s) (75 mL/Hr) IV Continuous <Continuous>  vancomycin  IVPB 1000 milliGRAM(s) IV Intermittent every 12 hours    MEDICATIONS  (PRN):  acetaminophen   Tablet .. 650 milliGRAM(s) Oral every 6 hours PRN Temp greater or equal to 38C (100.4F)  ibuprofen  Tablet. 600 milliGRAM(s) Oral every 6 hours PRN Moderate Pain (4 - 6)  traMADol 50 milliGRAM(s) Oral every 6 hours PRN Severe Pain (7 - 10)      Allergies    No Known Allergies    Intolerances      FAMILY HISTORY:  No pertinent family history      SOCIAL HISTORY: Patient presents alone bedside    Last Dental Visit: Unknown    Vital Signs Last 24 Hrs  T(C): 38.2 (01 Oct 2019 02:39), Max: 38.5 (30 Sep 2019 17:03)  T(F): 100.8 (01 Oct 2019 02:39), Max: 101.3 (30 Sep 2019 17:03)  HR: 112 (01 Oct 2019 02:39) (107 - 118)  BP: 180/119 (01 Oct 2019 02:39) (169/121 - 181/131)  BP(mean): --  RR: 18 (01 Oct 2019 02:39) (16 - 18)  SpO2: 100% (01 Oct 2019 02:39) (98% - 100%)    EOE:  TMJ ( -  ) clicks                    (  -  ) pops                    (  -  ) crepitus             Mandible: Guarded MO approximately 35mm             Facial bones and MOM grossly intact             ( -  ) trismus             ( -  ) LAD             ( +  ) swelling: upper lip swelling, left side firm              ( -  ) asymmetry             ( +  ) palpation: tender to palpation of upper lip, left maxilla, and lower border of left mandible             ( -  ) SOB             ( -  ) dysphagia             ( -  ) LOC    IOE:  permanent dentition: multiple missing teeth           hard/soft palate:  ( -  ) palatal torus           tongue/FOM WNL           labial/buccal mucosa WNL           ( -  ) percussion           ( -  ) palpation           ( -  ) swelling     Dentition present: #6-11, #21-27  Mobility: No gross mobility noted  Caries: No gross decay noted    Radiographs: 1 panoramic radiograph, interpreted: no signs of acute odontogenic infection, no bony pathology noted.    LABS:                        13.4   22.5  )-----------( 216      ( 30 Sep 2019 19:20 )             41.9     09-30    132<L>  |  97  |  12  ----------------------------<  117<H>  4.1   |  22  |  1.10    Ca    9.9      30 Sep 2019 19:20    TPro  9.1<H>  /  Alb  4.6  /  TBili  1.3<H>  /  DBili  x   /  AST  26  /  ALT  21  /  AlkPhos  76      WBC Count: 22.5 K/uL <H> [3.8 - 10.5] ( @ 19:20)    Platelet Count - Automated: 216 K/uL [150 - 400] ( @ 19:20)    INR: 1.24 ratio <H> [0.88 - 1.16] ( @ 20:18)      Urinalysis Basic - ( 30 Sep 2019 20:18 )    Color: Yellow / Appearance: Clear / S.018 / pH: x  Gluc: x / Ketone: Negative  / Bili: Negative / Urobili: >8mg/dL   Blood: x / Protein: 30 mg/dL / Nitrite: Negative   Leuk Esterase: Negative / RBC: 11 /hpf / WBC 1 /HPF   Sq Epi: x / Non Sq Epi: 0 /hpf / Bacteria: Negative          RADIOLOGY & ADDITIONAL STUDIES: CT of head and neck, see impression    ASSESSMENT: Upper lip swelling and cellulitis, dental unlikely source of infection. No signs of acute odontogenic infection.    PROCEDURE: EOE/IOE, panoramic radiograph    RECOMMENDATIONS:   1) Dental F/U with outpatient dentist for comprehensive dental care.   2) If any difficulty swallowing/breathing, fever occur, page dental.     Maria Elena Mello DDS and Carmen Solorio DDS, pager #[phone]

## 2019-10-01 NOTE — ED ADULT NURSE REASSESSMENT NOTE - NS ED NURSE REASSESS COMMENT FT1
page was placed to notify admitting MD of patient's tachycardia and vs. will reattempt if MD does not return page. Will continue to monitor patient. patient has upper lip swelling. denies any other complaints.

## 2019-10-01 NOTE — H&P ADULT - PROBLEM SELECTOR PLAN 2
- C/w - C/w Clonidine 0.1 mg po daily  - c/w Amlodipine 5mg PO daily  - c/w lisinopril 20mg po daily

## 2019-10-01 NOTE — H&P ADULT - ASSESSMENT
45 yo M presents with sepsis 2/2 cellulitis of the upper labia 45 yo M presents with sepsis 2/2 non-purulent cellulitis of the upper labia

## 2019-10-01 NOTE — PROGRESS NOTE ADULT - ASSESSMENT
43 yo M with PMH of asthma and essential HTN presenting with sepsis 2/2 cellulitis of the upper lip.

## 2019-10-01 NOTE — ED ADULT NURSE REASSESSMENT NOTE - NS ED NURSE REASSESS COMMENT FT1
dr espinal made aware of bp 163/119 and hr of 111; pt received AM dose of zestril; pt is asymptomatic; no new orders at this time; pt remains alert and oriented; await bed assignment.

## 2019-10-01 NOTE — CONSULT NOTE ADULT - SUBJECTIVE AND OBJECTIVE BOX
ALPHONSO MORRISSEY 44y Male  MRN-[id number]    Patient is a 44y old  Male who presents with a chief complaint of Cellulitis (01 Oct 2019 08:59)      HPI:  45 yo M no pertinent past medical history presents with one day of subjective fever, lip swelling, and pain. Patient states that he has recurrent mouth sores 2-3 times a year.  He describes the lesions as mucousal "flat", "white", "circular".  He occasionally has associated fever.  These lesions usually go away on its own or if persistent resolve with use of oral gel.  Day before yesterday he noticed a similar lesion located on the inner/corner part of left lip.  He thought it was filled with puss.  Within 6-12 hours his entire lip was swollen. He also endorses subjective fevers and chills. Denies trauma to the area, no recent dental or cosmetic procedures. Remote Root canal performed over one year ago.  Patient otherwise denies having nausea, vomiting, cough, SOB, joint pain.  He denies any GI/ symptoms.  His mother had a similar incident of lip swelling long time ago.      In the ED  VS: max 101.3F, -118, -181/112-131, RR 16-18, sPO2 % RA   Received one dose of vancomycin, 2L LR bolus, Tylenol (01 Oct 2019 00:24)    Feels better today. No trauma, bug bites.     PAST MEDICAL & SURGICAL HISTORY:  Asthma  Hypertension  No significant past surgical history      Allergies    No Known Allergies    Intolerances        ANTIMICROBIALS:  acyclovir IVPB    acyclovir IVPB 370 every 8 hours  piperacillin/tazobactam IVPB.. 3.375 every 8 hours  vancomycin  IVPB 1000 every 12 hours      MEDICATIONS  (STANDING):  acyclovir IVPB      acyclovir IVPB 370 milliGRAM(s) IV Intermittent every 8 hours  amLODIPine   Tablet 5 milliGRAM(s) Oral daily  cloNIDine 0.1 milliGRAM(s) Oral two times a day  fenofibrate Tablet 145 milliGRAM(s) Oral daily  piperacillin/tazobactam IVPB.. 3.375 Gram(s) IV Intermittent every 8 hours  sodium chloride 0.9%. 1000 milliLiter(s) (100 mL/Hr) IV Continuous <Continuous>  vancomycin  IVPB 1000 milliGRAM(s) IV Intermittent every 12 hours      Social History  Smoking: no  Etoh: no  Drug use: no      FAMILY HISTORY:  No pertinent family history  Mother with history of lip swelling       Vital Signs Last 24 Hrs  T(C): 37.5 (01 Oct 2019 07:38), Max: 38.5 (30 Sep 2019 17:03)  T(F): 99.5 (01 Oct 2019 07:38), Max: 101.3 (30 Sep 2019 17:03)  HR: 123 (01 Oct 2019 07:38) (107 - 123)  BP: 148/101 (01 Oct 2019 07:38) (148/101 - 181/131)  BP(mean): --  RR: 16 (01 Oct 2019 07:38) (16 - 18)  SpO2: 98% (01 Oct 2019 07:38) (98% - 100%)    CBC Full  -  ( 30 Sep 2019 19:20 )  WBC Count : 22.5 K/uL  RBC Count : 6.77 M/uL  Hemoglobin : 13.4 g/dL  Hematocrit : 41.9 %  Platelet Count - Automated : 216 K/uL  Mean Cell Volume : 61.9 fl  Mean Cell Hemoglobin : 19.7 pg  Mean Cell Hemoglobin Concentration : 31.9 gm/dL  Auto Neutrophil # : 21.3 K/uL  Auto Lymphocyte # : 0.7 K/uL  Auto Monocyte # : 0.4 K/uL  Auto Eosinophil # : 0.0 K/uL  Auto Basophil # : 0.1 K/uL  Auto Neutrophil % : 94.0 %  Auto Lymphocyte % : 1.0 %  Auto Monocyte % : 1.0 %  Auto Eosinophil % : 0.0 %  Auto Basophil % : 0.0 %        132<L>  |  97  |  12  ----------------------------<  117<H>  4.1   |  22  |  1.10    Ca    9.9      30 Sep 2019 19:20    TPro  9.1<H>  /  Alb  4.6  /  TBili  1.3<H>  /  DBili  x   /  AST  26  /  ALT  21  /  AlkPhos  76      LIVER FUNCTIONS - ( 30 Sep 2019 19:20 )  Alb: 4.6 g/dL / Pro: 9.1 g/dL / ALK PHOS: 76 U/L / ALT: 21 U/L / AST: 26 U/L / GGT: x           Urinalysis Basic - ( 30 Sep 2019 20:18 )    Color: Yellow / Appearance: Clear / S.018 / pH: x  Gluc: x / Ketone: Negative  / Bili: Negative / Urobili: >8mg/dL   Blood: x / Protein: 30 mg/dL / Nitrite: Negative   Leuk Esterase: Negative / RBC: 11 /hpf / WBC 1 /HPF   Sq Epi: x / Non Sq Epi: 0 /hpf / Bacteria: Negative        MICROBIOLOGY:    HIV-1/2 Antigen/Antibody Screen by [facility] (10.01.19 @ 07:06)    HIV-1/2 Combo Result: Nonreact: The HIV Ag/Ab Combo test performed screens for HIV-1 p24 antigen,  antibodies to HIV-1 (group M and group O), and antibodies to HIV-2. All  specimens repeatedly reactive will reflex to an HIV 1/2 antibody  confirmation and differentiation test. This assay detects p24 antigen  which may be present prior to the development of HIV antibodies,  therefore a reactive result with a negative HIV 1/2 AB Confirmation  should be followed up with HIV-1 RNA, HIV-2 RNA and repeat testing in 4-8  weeks. A nonreactive result does not preclude previous exposure to or  infection with HIV-1 or HIV-2. [facility] prohibits disclosure of this  result to any unauthorized party.        RADIOLOGY  < from: CT Maxillofacial w/ IV Cont (19 @ 21:23) >  Mild left facial soft tissue stranding, most pronounced the level of the   left mandible and extending anterior to masseter muscle, concerning for   cellulitis.     No discrete rim-enhancing collection identified to suggest abscess at   this time. Dental hardware generates beam hardening artifact which   obscures detailed evaluation.  Tiny punctate bubble of air adjacent to   the left mandibular periosteum. Consider oral maxillofacial surgical   consultation if there is concern for dental infection.

## 2019-10-01 NOTE — H&P ADULT - NSHPPHYSICALEXAM_GEN_ALL_CORE
Vital Signs Last 24 Hrs  T(C): 36.9 (30 Sep 2019 20:17), Max: 38.5 (30 Sep 2019 17:03)  T(F): 98.5 (30 Sep 2019 20:17), Max: 101.3 (30 Sep 2019 17:03)  HR: 107 (30 Sep 2019 20:17) (107 - 118)  BP: 177/112 (30 Sep 2019 20:17) (169/121 - 181/131)  RR: 17 (30 Sep 2019 20:17) (16 - 18)  SpO2: 100% (30 Sep 2019 20:17) (98% - 100%)    PHYSICAL EXAM:  GENERAL: NAD, well-groomed, well-developed  HEAD:  Atraumatic, Normocephalic  EYES: EOMI, PERRLA, conjunctiva and sclera clear  ENMT: No tonsillar erythema, exudates, or enlargement; Moist mucous membranes, Good dentition  NECK: Supple, No JVD  NERVOUS SYSTEM: AOX3, motor and sensation grossly intact in b/l UE and b/l LE  PSYCHIATRIC: Appropriate affect and mood  CHEST/LUNG: Clear to auscultation bilaterally; No rales, rhonchi, wheezing, or rubs  HEART: Regular rate and rhythm; No murmurs, rubs, or gallops. No LE edema  ABDOMEN: Soft, Nontender, Nondistended; Bowel sounds present  EXTREMITIES:  2+ Peripheral Pulses, No clubbing, cyanosis  SKIN: No rashes or lesions Vital Signs Last 24 Hrs  T(C): 36.9 (30 Sep 2019 20:17), Max: 38.5 (30 Sep 2019 17:03)  T(F): 98.5 (30 Sep 2019 20:17), Max: 101.3 (30 Sep 2019 17:03)  HR: 107 (30 Sep 2019 20:17) (107 - 118)  BP: 177/112 (30 Sep 2019 20:17) (169/121 - 181/131)  RR: 17 (30 Sep 2019 20:17) (16 - 18)  SpO2: 100% (30 Sep 2019 20:17) (98% - 100%)    PHYSICAL EXAM:  GENERAL: NAD, well-groomed, well-developed  HEAD:  Atraumatic, Normocephalic  EYES: EOMI, conjunctiva and sclera clear  ENMT: Left labial swelling, +TTP, erythema/warmth no puncture wound, dry mucus membranes   NECK: Supple, No JVD  NERVOUS SYSTEM: AOX3, motor and sensation grossly intact in b/l UE and b/l LE  PSYCHIATRIC: Appropriate affect and mood  CHEST/LUNG: Clear to auscultation bilaterally; No rales, rhonchi, wheezing, or rubs  HEART: Regular rate and rhythm; No murmurs, rubs, or gallops. No LE edema  ABDOMEN: Soft, Nontender, Nondistended; Bowel sounds present  EXTREMITIES:  2+ Peripheral Pulses, No clubbing, cyanosis  SKIN: No rashes or lesions Vital Signs Last 24 Hrs  T(C): 36.9 (30 Sep 2019 20:17), Max: 38.5 (30 Sep 2019 17:03)  T(F): 98.5 (30 Sep 2019 20:17), Max: 101.3 (30 Sep 2019 17:03)  HR: 107 (30 Sep 2019 20:17) (107 - 118)  BP: 177/112 (30 Sep 2019 20:17) (169/121 - 181/131)  RR: 17 (30 Sep 2019 20:17) (16 - 18)  SpO2: 100% (30 Sep 2019 20:17) (98% - 100%)    PHYSICAL EXAM:  GENERAL: NAD, well-groomed, well-developed  HEAD:  Atraumatic, Normocephalic  EYES: EOMI, conjunctiva and sclera clear  ENMT: Left labial swelling, +TTP, erythema/warmth no puncture wound, dry mucus membranes ; no vesicular lesions and no aphthous ulcers noticed  NECK: Supple, No JVD  NERVOUS SYSTEM: AOX3, motor and sensation grossly intact in b/l UE and b/l LE  PSYCHIATRIC: Appropriate affect and mood  CHEST/LUNG: Clear to auscultation bilaterally; No rales, rhonchi, wheezing, or rubs  HEART: Regular rate and rhythm; No murmurs, rubs, or gallops. No LE edema  ABDOMEN: Soft, Nontender, Nondistended; Bowel sounds present  EXTREMITIES:  2+ Peripheral Pulses, No clubbing, cyanosis  SKIN: No rashes or lesions

## 2019-10-02 ENCOUNTER — TRANSCRIPTION ENCOUNTER (OUTPATIENT)
Age: 44
End: 2019-10-02

## 2019-10-02 DIAGNOSIS — R78.81 BACTEREMIA: ICD-10-CM

## 2019-10-02 LAB
ALBUMIN SERPL ELPH-MCNC: 3.9 G/DL — SIGNIFICANT CHANGE UP (ref 3.3–5)
ALP SERPL-CCNC: 77 U/L — SIGNIFICANT CHANGE UP (ref 40–120)
ALT FLD-CCNC: 21 U/L — SIGNIFICANT CHANGE UP (ref 10–45)
AMYLASE P1 CFR SERPL: 36 U/L — SIGNIFICANT CHANGE UP (ref 25–125)
ANA TITR SER: NEGATIVE — SIGNIFICANT CHANGE UP
ANION GAP SERPL CALC-SCNC: 12 MMOL/L — SIGNIFICANT CHANGE UP (ref 5–17)
ANISOCYTOSIS BLD QL: SIGNIFICANT CHANGE UP
AST SERPL-CCNC: 18 U/L — SIGNIFICANT CHANGE UP (ref 10–40)
BASOPHILS # BLD AUTO: 0.02 K/UL — SIGNIFICANT CHANGE UP (ref 0–0.2)
BASOPHILS NFR BLD AUTO: 0.1 % — SIGNIFICANT CHANGE UP (ref 0–2)
BILIRUB SERPL-MCNC: 1 MG/DL — SIGNIFICANT CHANGE UP (ref 0.2–1.2)
BUN SERPL-MCNC: 20 MG/DL — SIGNIFICANT CHANGE UP (ref 7–23)
CALCIUM SERPL-MCNC: 9.2 MG/DL — SIGNIFICANT CHANGE UP (ref 8.4–10.5)
CCP IGG SERPL-ACNC: <8 UNITS — SIGNIFICANT CHANGE UP (ref 0–19)
CHLORIDE SERPL-SCNC: 98 MMOL/L — SIGNIFICANT CHANGE UP (ref 96–108)
CO2 SERPL-SCNC: 24 MMOL/L — SIGNIFICANT CHANGE UP (ref 22–31)
CREAT SERPL-MCNC: 1.14 MG/DL — SIGNIFICANT CHANGE UP (ref 0.5–1.3)
DACRYOCYTES BLD QL SMEAR: SLIGHT — SIGNIFICANT CHANGE UP
DSDNA AB SER-ACNC: <12 IU/ML — SIGNIFICANT CHANGE UP
ELLIPTOCYTES BLD QL SMEAR: SLIGHT — SIGNIFICANT CHANGE UP
EOSINOPHIL # BLD AUTO: 0.01 K/UL — SIGNIFICANT CHANGE UP (ref 0–0.5)
EOSINOPHIL NFR BLD AUTO: 0.1 % — SIGNIFICANT CHANGE UP (ref 0–6)
GLUCOSE SERPL-MCNC: 73 MG/DL — SIGNIFICANT CHANGE UP (ref 70–99)
HCT VFR BLD CALC: 39.3 % — SIGNIFICANT CHANGE UP (ref 39–50)
HGB BLD-MCNC: 12.4 G/DL — LOW (ref 13–17)
HSV+VZV DNA SPEC QL NAA+PROBE: SIGNIFICANT CHANGE UP
HYPOCHROMIA BLD QL: SLIGHT — SIGNIFICANT CHANGE UP
IMM GRANULOCYTES NFR BLD AUTO: 0.6 % — SIGNIFICANT CHANGE UP (ref 0–1.5)
LYMPHOCYTES # BLD AUTO: 0.73 K/UL — LOW (ref 1–3.3)
LYMPHOCYTES # BLD AUTO: 5.1 % — LOW (ref 13–44)
MAGNESIUM SERPL-MCNC: 2.2 MG/DL — SIGNIFICANT CHANGE UP (ref 1.6–2.6)
MANUAL SMEAR VERIFICATION: SIGNIFICANT CHANGE UP
MCHC RBC-ENTMCNC: 19.5 PG — LOW (ref 27–34)
MCHC RBC-ENTMCNC: 31.6 GM/DL — LOW (ref 32–36)
MCV RBC AUTO: 61.7 FL — LOW (ref 80–100)
MICROCYTES BLD QL: SIGNIFICANT CHANGE UP
MONOCYTES # BLD AUTO: 1.07 K/UL — HIGH (ref 0–0.9)
MONOCYTES NFR BLD AUTO: 7.5 % — SIGNIFICANT CHANGE UP (ref 2–14)
NEUTROPHILS # BLD AUTO: 12.41 K/UL — HIGH (ref 1.8–7.4)
NEUTROPHILS NFR BLD AUTO: 86.6 % — HIGH (ref 43–77)
PHOSPHATE SERPL-MCNC: 1.3 MG/DL — LOW (ref 2.5–4.5)
PLAT MORPH BLD: NORMAL — SIGNIFICANT CHANGE UP
PLATELET # BLD AUTO: 189 K/UL — SIGNIFICANT CHANGE UP (ref 150–400)
POIKILOCYTOSIS BLD QL AUTO: SLIGHT — SIGNIFICANT CHANGE UP
POLYCHROMASIA BLD QL SMEAR: SLIGHT — SIGNIFICANT CHANGE UP
POTASSIUM SERPL-MCNC: 4 MMOL/L — SIGNIFICANT CHANGE UP (ref 3.5–5.3)
POTASSIUM SERPL-SCNC: 4 MMOL/L — SIGNIFICANT CHANGE UP (ref 3.5–5.3)
PROT SERPL-MCNC: 7.8 G/DL — SIGNIFICANT CHANGE UP (ref 6–8.3)
RBC # BLD: 6.37 M/UL — HIGH (ref 4.2–5.8)
RBC # FLD: 17.6 % — HIGH (ref 10.3–14.5)
RBC BLD AUTO: ABNORMAL
RF+CCP IGG SER-IMP: NEGATIVE — SIGNIFICANT CHANGE UP
SODIUM SERPL-SCNC: 134 MMOL/L — LOW (ref 135–145)
SPECIMEN SOURCE: SIGNIFICANT CHANGE UP
VANCOMYCIN TROUGH SERPL-MCNC: 7.6 UG/ML — LOW (ref 10–20)
WBC # BLD: 14.33 K/UL — HIGH (ref 3.8–10.5)
WBC # FLD AUTO: 14.33 K/UL — HIGH (ref 3.8–10.5)

## 2019-10-02 PROCEDURE — 99233 SBSQ HOSP IP/OBS HIGH 50: CPT

## 2019-10-02 PROCEDURE — 99233 SBSQ HOSP IP/OBS HIGH 50: CPT | Mod: GC

## 2019-10-02 RX ORDER — VANCOMYCIN HCL 1 G
1500 VIAL (EA) INTRAVENOUS EVERY 12 HOURS
Refills: 0 | Status: DISCONTINUED | OUTPATIENT
Start: 2019-10-02 | End: 2019-10-03

## 2019-10-02 RX ORDER — SODIUM,POTASSIUM PHOSPHATES 278-250MG
2 POWDER IN PACKET (EA) ORAL ONCE
Refills: 0 | Status: COMPLETED | OUTPATIENT
Start: 2019-10-02 | End: 2019-10-02

## 2019-10-02 RX ADMIN — Medication 166.67 MILLIGRAM(S): at 10:32

## 2019-10-02 RX ADMIN — Medication 600 MILLIGRAM(S): at 06:25

## 2019-10-02 RX ADMIN — AMLODIPINE BESYLATE 5 MILLIGRAM(S): 2.5 TABLET ORAL at 06:25

## 2019-10-02 RX ADMIN — AMPICILLIN SODIUM AND SULBACTAM SODIUM 200 GRAM(S): 250; 125 INJECTION, POWDER, FOR SUSPENSION INTRAMUSCULAR; INTRAVENOUS at 06:25

## 2019-10-02 RX ADMIN — Medication 2 PACKET(S): at 12:16

## 2019-10-02 RX ADMIN — Medication 0.1 MILLIGRAM(S): at 06:25

## 2019-10-02 RX ADMIN — Medication 107.4 MILLIGRAM(S): at 12:09

## 2019-10-02 RX ADMIN — Medication 107.4 MILLIGRAM(S): at 03:12

## 2019-10-02 RX ADMIN — Medication 145 MILLIGRAM(S): at 12:16

## 2019-10-02 RX ADMIN — Medication 600 MILLIGRAM(S): at 07:00

## 2019-10-02 RX ADMIN — Medication 0.1 MILLIGRAM(S): at 18:00

## 2019-10-02 RX ADMIN — Medication 62.5 MILLIMOLE(S): at 12:08

## 2019-10-02 NOTE — PROGRESS NOTE ADULT - PROBLEM SELECTOR PLAN 3
DVT: Improve score 0, ambulate   Diet: Regular   Dispo: Home DVT: Improve score 0, ambulate   Diet: Regular   Dispo: Home pending improvement and resolution of bacteremia DVT: Improve score 0, ambulate   Diet: DASH diet  Dispo: Home pending improvement and resolution of bacteremia

## 2019-10-02 NOTE — PROGRESS NOTE ADULT - ASSESSMENT
45 yo M with PMH of asthma and essential HTN presenting with sepsis 2/2 cellulitis of the upper lip and was found to have MRSA bacteremia. 43 yo M with PMH of asthma and essential HTN presenting with sepsis 2/2 cellulitis of the upper lip and was found to have MRSA bacteremia, likely from superinfection of lip.

## 2019-10-02 NOTE — PROGRESS NOTE ADULT - SUBJECTIVE AND OBJECTIVE BOX
ALPHONSO MORRISSEYy Male    Medicine Team 2  Iona Delatorre MS4  Pager [phone]    INTERVAL HPI/OVERNIGHT EVENTS: Pt says he is feeling well. Reports lip discomfort decreasing from 7/10 yesterday to now 6/10 today. States that Motrin has been for fever and not pain. Pt denies use of warm compresses. Denies any nausea, vomiting, diarrhea, or dysuria. Pt endorses chills only during his fevers last night. Pt clarified with mother her lip swelling - she said it was "over a decade ago" and it was "a bacterial infection" from her gums. She was given antibiotics by family who are doctors and the swelling resolved within "a week or two."       REVIEW OF SYSTEMS:  CONSTITUTIONAL: +fever & chills last night, weight loss, or fatigue  EYES: No eye pain, visual disturbances, or discharge  ENMT:  No difficulty hearing, tinnitus, vertigo; No sinus or throat pain, +swollen lip described as a "discomfort" and "dull pressure" decreased in discomfort from yesterday  NECK: No pain or stiffness  RESPIRATORY: No cough or wheezing; No shortness of breath  CARDIOVASCULAR: No chest pain, palpitations, dizziness, or leg swelling  GASTROINTESTINAL: No abdominal or epigastric pain. No diarrhea or constipation.  GENITOURINARY: No dysuria, frequency, hematuria, or incontinence  NEUROLOGICAL: No headaches, memory loss, loss of strength, numbness, or tremors  SKIN: No itching, burning, rashes, or lesions   MUSCULOSKELETAL: No joint pain or swelling; No muscle, back, or extremity pain  ALLERGY AND IMMUNOLOGIC: No hives or eczema    FAMILY HISTORY:  Mother had history of swollen lip   Father had history of HTN     Vital Signs Last 24 Hrs  T(C): 37.4 (02 Oct 2019 06:17), Max: 38.6 (01 Oct 2019 16:58)  T(F): 99.3 (02 Oct 2019 06:17), Max: 101.5 (01 Oct 2019 16:58)  HR: 117 (02 Oct 2019 06:17) (83 - 117)  BP: 148/89 (02 Oct 2019 06:17) (115/79 - 156/90)  BP(mean): --  RR: 18 (02 Oct 2019 06:17) (18 - 18)  SpO2: 98% (02 Oct 2019 06:17) (96% - 98%)      PHYSICAL EXAM:  GENERAL: NAD, well-groomed, well-developed  HEAD:  Atraumatic, Normocephalic  EYES: EOMI, conjunctiva and sclera clear  ENMT: Moist mucous membranes, +missing upper and lower teeth on the sides, +swollen upper lip mainly on the left spreading slightly past midline that is dark in color and hard to the touch, +black circular lesion in left corner of mouth between lips that was white in color yesterday  NECK: Supple, No JVD  NERVOUS SYSTEM:  Alert & Oriented X3, Good concentration; Motor Strength 5/5 B/L upper and lower extremities  CHEST/LUNG: Clear to percussion bilaterally; No rales, rhonchi, wheezing, or rubs  HEART: Regular rate and rhythm  ABDOMEN: Soft, Nontender, Nondistended; Bowel sounds present  EXTREMITIES:  2+ Peripheral Pulses, No clubbing, cyanosis, or edema  LYMPH: No lymphadenopathy noted  SKIN: No rashes or lesions    Consultant(s) Notes Reviewed:  [x ] YES  [ ] NO  Care Discussed with Consultants/Other Providers [ x] YES  [ ] NO      LABS:                        13.4   22.5  )-----------( 216      ( 30 Sep 2019 19:20 )             41.9     10-02    134<L>  |  98  |  20  ----------------------------<  73  4.0   |  24  |  1.14    Ca    9.2      02 Oct 2019 06:47  Phos  1.3     10-02    TPro  7.8  /  Alb  3.9  /  TBili  1.0  /  DBili  x   /  AST  18  /  ALT  21  /  AlkPhos  77  10-02    PT/INR - ( 30 Sep 2019 20:18 )   PT: 14.3 sec;   INR: 1.24 ratio         PTT - ( 30 Sep 2019 20:18 )  PTT:28.6 sec    Urine Cultures 9/30 23:10: wnl    Blood Cultures 9/30 23:07:   - Growth in anaerobic bottle: gram + cocci in pairs  - Growth in aerobic bottle: gram + cocci in clusters  - Gram Stain: MRSA    Blood Cultures 10/1 01:01:  - Growth in anaerobic bottle: gram + cocci in clusters  - Growth in aerobic bottle: gram + cocci in clusters    -Amylase 36  - Lyme negative    RADIOLOGY & ADDITIONAL TESTS:  EKG 9/30 20:57  Ventricular Rate 104 BPM, Atrial Rate 104 BPM, P-R Interval 170 ms, QRS Duration 78 ms, Q-T Interval 340 ms, QTC Calculation(Bezet) 447 ms, P Axis 38 degrees, R Axis 4 degrees, T Axis 21 degrees, Diagnosis Line SINUS TACHYCARDIA, MINIMAL VOLTAGE CRITERIA FOR LVH, MAY BE NORMAL VARIANT, BORDERLINE ECG    CT Maxillofacial w/ IV contrast 9/30 21:23  IMPRESSION:    Mild left facial soft tissue stranding, most pronounced the level of the left mandible and extending anterior to masseter muscle, concerning for cellulitis.   No discrete rim-enhancing collection identified to suggest abscess at this time. Dental hardware generates beam hardening artifact which obscures detailed evaluation.  Tiny punctate bubble of air adjacent to the left mandibular periosteum. Consider oral maxillofacial surgical consultation if there is concern for dental infection.    Imaging Personally Reviewed:  [X] YES  [ ] NO      MEDICATIONS  (STANDING):  acyclovir IVPB      acyclovir IVPB 370 milliGRAM(s) IV Intermittent every 8 hours  amLODIPine   Tablet 5 milliGRAM(s) Oral daily  ampicillin/sulbactam  IVPB      ampicillin/sulbactam  IVPB 3 Gram(s) IV Intermittent every 6 hours  cloNIDine 0.1 milliGRAM(s) Oral two times a day  fenofibrate Tablet 145 milliGRAM(s) Oral daily  influenza   Vaccine 0.5 milliLiter(s) IntraMuscular once  sodium chloride 0.9%. 1000 milliLiter(s) (100 mL/Hr) IV Continuous <Continuous>  vancomycin  IVPB 1250 milliGRAM(s) IV Intermittent every 12 hours    MEDICATIONS  (PRN):  acetaminophen   Tablet .. 650 milliGRAM(s) Oral every 6 hours PRN Temp greater or equal to 38C (100.4F)  ibuprofen  Tablet. 600 milliGRAM(s) Oral every 6 hours PRN Moderate Pain (4 - 6)  traMADol 50 milliGRAM(s) Oral every 6 hours PRN Severe Pain (7 - 10) ALPHONSO MORRISSEYy Male    Medicine Team 2  Iona Delatorre MS4  Pager [phone]    INTERVAL HPI/OVERNIGHT EVENTS: Pt says he is feeling well. Reports lip discomfort decreasing from 7/10 yesterday to now 6/10 today. States that Motrin has been for fever and not pain. Pt denies use of warm compresses. Denies any nausea, vomiting, diarrhea, or dysuria. Pt endorses chills only during his fevers last night. Pt clarified with mother her lip swelling - she said it was "over a decade ago" and it was "a bacterial infection" from her gums. She was given antibiotics by family who are doctors and the swelling resolved within "a week or two."       REVIEW OF SYSTEMS:  CONSTITUTIONAL: +fever & chills last night, weight loss, or fatigue  EYES: No eye pain, visual disturbances, or discharge  ENMT:  No difficulty hearing, tinnitus, vertigo; No sinus or throat pain, +swollen lip described as a "discomfort" and "dull pressure" decreased in discomfort from yesterday  NECK: No pain or stiffness  RESPIRATORY: No cough or wheezing; No shortness of breath  CARDIOVASCULAR: No chest pain, palpitations, dizziness, or leg swelling  GASTROINTESTINAL: No abdominal or epigastric pain. No diarrhea or constipation.  GENITOURINARY: No dysuria, frequency, hematuria, or incontinence  NEUROLOGICAL: No headaches, memory loss, loss of strength, numbness, or tremors  SKIN: No itching, burning, rashes, or lesions   MUSCULOSKELETAL: No joint pain or swelling; No muscle, back, or extremity pain  ALLERGY AND IMMUNOLOGIC: No hives or eczema    FAMILY HISTORY:  Mother had history of swollen lip   Father had history of HTN     Vital Signs Last 24 Hrs  T(C): 37.4 (02 Oct 2019 06:17), Max: 38.6 (01 Oct 2019 16:58)  T(F): 99.3 (02 Oct 2019 06:17), Max: 101.5 (01 Oct 2019 16:58)  HR: 117 (02 Oct 2019 06:17) (83 - 117)  BP: 148/89 (02 Oct 2019 06:17) (115/79 - 156/90)  BP(mean): --  RR: 18 (02 Oct 2019 06:17) (18 - 18)  SpO2: 98% (02 Oct 2019 06:17) (96% - 98%)      PHYSICAL EXAM:  GENERAL: NAD, well-groomed, well-developed  HEAD:  Atraumatic, Normocephalic  EYES: EOMI, conjunctiva and sclera clear  ENMT: Moist mucous membranes, +missing upper and lower teeth on the sides, +swollen upper lip mainly on the left spreading slightly past midline that is dark in color and hard to the touch, +black circular lesion in left corner of mouth between lips that was white in color yesterday  NECK: Supple, No JVD  NERVOUS SYSTEM:  Alert & Oriented X3, Good concentration; Motor Strength 5/5 B/L upper and lower extremities  CHEST/LUNG: Clear to percussion bilaterally; No rales, rhonchi, wheezing, or rubs  HEART: Regular rate and rhythm  ABDOMEN: Soft, Nontender, Nondistended; Bowel sounds present  EXTREMITIES:  2+ Peripheral Pulses, No clubbing, cyanosis, or edema  LYMPH: No lymphadenopathy noted  SKIN: No rashes or lesions    Consultant(s) Notes Reviewed:  [x ] YES  [ ] NO  Care Discussed with Consultants/Other Providers [ x] YES  [ ] NO        LABS:                        12.4   14.33 )-----------( 189      ( 02 Oct 2019 09:15 )             39.3     10-02    134<L>  |  98  |  20  ----------------------------<  73  4.0   |  24  |  1.14    Ca    9.2      02 Oct 2019 06:47  Phos  1.3     10-02    TPro  7.8  /  Alb  3.9  /  TBili  1.0  /  DBili  x   /  AST  18  /  ALT  21  /  AlkPhos  77  10-02    PT/INR - ( 30 Sep 2019 20:18 )   PT: 14.3 sec;   INR: 1.24 ratio         PTT - ( 30 Sep 2019 20:18 )  PTT:28.6 sec      Urine Cultures 9/30 23:10: wnl    Blood Cultures 9/30 23:07:   - Growth in anaerobic bottle: gram + cocci in pairs  - Growth in aerobic bottle: gram + cocci in clusters  - Gram Stain: MRSA    Blood Cultures 10/1 01:01:  - Growth in anaerobic bottle: gram + cocci in clusters  - Growth in aerobic bottle: gram + cocci in clusters    -Amylase 36  - Lyme negative    RADIOLOGY & ADDITIONAL TESTS:  EKG 9/30 20:57  Ventricular Rate 104 BPM, Atrial Rate 104 BPM, P-R Interval 170 ms, QRS Duration 78 ms, Q-T Interval 340 ms, QTC Calculation(Bezet) 447 ms, P Axis 38 degrees, R Axis 4 degrees, T Axis 21 degrees, Diagnosis Line SINUS TACHYCARDIA, MINIMAL VOLTAGE CRITERIA FOR LVH, MAY BE NORMAL VARIANT, BORDERLINE ECG    CT Maxillofacial w/ IV contrast 9/30 21:23  IMPRESSION:    Mild left facial soft tissue stranding, most pronounced the level of the left mandible and extending anterior to masseter muscle, concerning for cellulitis.   No discrete rim-enhancing collection identified to suggest abscess at this time. Dental hardware generates beam hardening artifact which obscures detailed evaluation.  Tiny punctate bubble of air adjacent to the left mandibular periosteum. Consider oral maxillofacial surgical consultation if there is concern for dental infection.    Imaging Personally Reviewed:  [X] YES  [ ] NO      MEDICATIONS  (STANDING):  acyclovir IVPB      acyclovir IVPB 370 milliGRAM(s) IV Intermittent every 8 hours  amLODIPine   Tablet 5 milliGRAM(s) Oral daily  ampicillin/sulbactam  IVPB      ampicillin/sulbactam  IVPB 3 Gram(s) IV Intermittent every 6 hours  cloNIDine 0.1 milliGRAM(s) Oral two times a day  fenofibrate Tablet 145 milliGRAM(s) Oral daily  influenza   Vaccine 0.5 milliLiter(s) IntraMuscular once  sodium chloride 0.9%. 1000 milliLiter(s) (100 mL/Hr) IV Continuous <Continuous>  vancomycin  IVPB 1250 milliGRAM(s) IV Intermittent every 12 hours    MEDICATIONS  (PRN):  acetaminophen   Tablet .. 650 milliGRAM(s) Oral every 6 hours PRN Temp greater or equal to 38C (100.4F)  ibuprofen  Tablet. 600 milliGRAM(s) Oral every 6 hours PRN Moderate Pain (4 - 6)  traMADol 50 milliGRAM(s) Oral every 6 hours PRN Severe Pain (7 - 10)

## 2019-10-02 NOTE — PROGRESS NOTE ADULT - PROBLEM SELECTOR PLAN 1
Met sepsis criteria with leukocytosis, fevers and source of  non-purulent cellulitis of the upper lip, likely due to superinfection from mouth sore. Low suspicion for etiology of symptoms due to angioedema 2/2 ACE inhibitor use.   CT oral maxillofacial with Mild left facial soft tissue swelling concerning for cellulitis no evidence of abscess.   Currently protecting airway, low concern for compromise  - Per dental, no interventions at this time  - s/p Vancomycin in ED  - Bacterial cultures returned + for MRSA bacteremia  - per ID, vancomycin 1.25g IV Q12, acyclovir 370mg IV q8, d/c Zosyn  - ibuprofen and tramadol PRN for pain  - acetaminophen for temp greater or equal to 38C  - f/u HSV swab of lesion  - unclear if oral mucosal lesions are aphthous ulcer or 2/2 autoimmune disease (i.e SLE, Behcet's) , although no other systemic signs/symptoms present; Nevertheless, will check SOUTH, dsDNA; HIV and Lyme negative, RF elevated to 17 Met sepsis criteria with leukocytosis, fevers and source of  non-purulent cellulitis of the upper lip, likely due to superinfection from mouth sore. Low suspicion for etiology of symptoms due to angioedema 2/2 ACE inhibitor use.   CT oral maxillofacial with Mild left facial soft tissue swelling concerning for cellulitis no evidence of abscess.   Currently protecting airway, low concern for compromise  - Per dental, no interventions at this time  - s/p Vancomycin in ED  - Bacterial cultures returned + for MRSA bacteremia  - per ID, vancomycin 1.25g IV Q12, acyclovir 370mg IV q8, d/c Zosyn  - ibuprofen and tramadol PRN for pain  - acetaminophen for temp greater or equal to 38C  - f/u HSV swab of lesion  - unclear if oral mucosal lesions are aphthous ulcer or 2/2 autoimmune disease (i.e SLE, Behcet's) , although no other systemic signs/symptoms present; Nevertheless, will check SOUTH, dsDNA; HIV and Lyme negative, RF elevated to 17  - TTE ordered to r/o endocarditis Met sepsis criteria with leukocytosis, fevers and source of  non-purulent cellulitis of the upper lip, likely due to superinfection from mouth sore. Low suspicion for etiology of symptoms due to angioedema 2/2 ACE inhibitor use.   CT oral maxillofacial with Mild left facial soft tissue swelling concerning for cellulitis no evidence of abscess.   Currently protecting airway, low concern for compromise  - Per dental, no interventions at this time  - s/p Vancomycin in ED  - Bacterial cultures returned + for MRSA bacteremia  - per ID, vancomycin 1.25g IV Q12, acyclovir 370mg IV q8, will dc unasyn this am  - will obtain echo to r/o endocarditis  - Vanc trough today prior to PM dose.  - ibuprofen and tramadol PRN for pain  - acetaminophen for temp greater or equal to 38C  - f/u HSV swab of lesion  - unclear if oral mucosal lesions are aphthous ulcer or 2/2 autoimmune disease (i.e SLE, Behcet's) , although no other systemic signs/symptoms present; Nevertheless, will check SOUTH, dsDNA which are pending; HIV and Lyme negative, RF elevated to 17

## 2019-10-02 NOTE — PROGRESS NOTE ADULT - SUBJECTIVE AND OBJECTIVE BOX
Patient is a 44y old  Male who presents with a chief complaint of Cellulitis (02 Oct 2019 12:23)    Dental team was paged to re-assess a possible dental source of infection, as the patient had persistent lip swelling and febrile status. Please refer to previous dental consult note for further information.      HPI:  Jacques Cueto MD JOHNSON  Internal Medicine PGY-2  Pager: [facility]: [phone]; [facility] [phone]    45 yo M no pertinent past medical history presents with one day of subjective fever, lip swelling, and pain. Patient states that he has recurrent mouth sores 2-3 times a year.  He describes the lesions as mucousal "flat", "white", "circular".  He occasionally has associated fever.  These lesions usually go away on its own or if persistent resolve with use of oral gel.  Day before yesterday he noticed a similar lesion located on the inner/corner part of left lip.  He thought it was filled with puss.  Within 6-12 hours his entire lip was swollen. He also endorses subjective fevers and chills. Denies trauma to the area, no recent dental or cosmetic procedures. Remote Root canal performed over one year ago.  Patient otherwise denies having nausea, vomiting, cough, SOB, joint pain.  He denies any GI/ symptoms.  His mother had a similar incident of lip swelling long time ago.      In the ED  VS: max 101.3F, -118, -181/112-131, RR 16-18, sPO2 % RA   Received one dose of vancomycin, 2L LR bolus, Tylenol (01 Oct 2019 00:24)      PAST MEDICAL & SURGICAL HISTORY:  Asthma  Hypertension  No significant past surgical history    MEDICATIONS  (STANDING):  amLODIPine   Tablet 5 milliGRAM(s) Oral daily  cloNIDine 0.1 milliGRAM(s) Oral two times a day  fenofibrate Tablet 145 milliGRAM(s) Oral daily  influenza   Vaccine 0.5 milliLiter(s) IntraMuscular once  sodium chloride 0.9%. 1000 milliLiter(s) (100 mL/Hr) IV Continuous <Continuous>  vancomycin  IVPB 1250 milliGRAM(s) IV Intermittent every 12 hours    MEDICATIONS  (PRN):  acetaminophen   Tablet .. 650 milliGRAM(s) Oral every 6 hours PRN Temp greater or equal to 38C (100.4F)  ibuprofen  Tablet. 600 milliGRAM(s) Oral every 6 hours PRN Moderate Pain (4 - 6)  traMADol 50 milliGRAM(s) Oral every 6 hours PRN Severe Pain (7 - 10)      Allergies    No Known Allergies    Intolerances        FAMILY HISTORY:  No pertinent family history      SOCIAL HISTORY: pt presents alone at time of current examination    *Last Dental Visit:    Vital Signs Last 24 Hrs  T(C): 37.2 (02 Oct 2019 19:51), Max: 37.6 (02 Oct 2019 14:13)  T(F): 99 (02 Oct 2019 19:51), Max: 99.7 (02 Oct 2019 14:13)  HR: 95 (02 Oct 2019 19:51) (83 - 117)  BP: 125/77 (02 Oct 2019 19:51) (115/79 - 148/89)  BP(mean): --  RR: 18 (02 Oct 2019 19:51) (18 - 18)  SpO2: 97% (02 Oct 2019 19:51) (97% - 99%)    EOE:  TMJ ( -  ) clicks                    ( -   ) pops                    ( -   ) crepitus             Mandible FROM at time of examination             Facial bones and MOM grossly intact             ( -  ) trismus             ( -  ) LAD             ( +  ) swelling: pt's upper left corner of lip presents with swelling, firmness             ( +  ) asymmetry: pt's upper left corner of lip is swollen and firm             ( +  ) palpation: left corner of lip is painful upon palpation    Pt has multiple, dried yellow crusted lesions and hemostatic clots on his upper left lip, redness of lips present              IOE:  permanent dentition: multiple missing teeth           hard/soft palate:  ( - ) palatal torus           tongue/FOM WNL           labial/buccal mucosa; inner corner of left lip presents with multiple red and flat, white patchy lesions. maxillary labial frenum and maxillary anterior buccal gingiva is red           ( -  ) percussion           ( -  ) palpation           ( -  ) swelling, inner corner of left lip presents with swelling but no gingival and no other intra oral swelling were appreciated    Dentition present: upper right molar, #6-11, #21-17  Mobility: no gross mobility appreciated at this time    Pt's lip is very swollen and firm; difficult to fully retract upper lip for better visualization of buccal vestibular area.  Pt reported that he is feeling better at the time of the current clinical examination and that he was able to tolerate a greater degree of lip retraction at the current evaluation .     Radiographs:    LABS:                        12.4   14.33 )-----------( 189      ( 02 Oct 2019 09:15 )             39.3     10-02    134<L>  |  98  |  20  ----------------------------<  73  4.0   |  24  |  1.14    Ca    9.2      02 Oct 2019 06:47  Phos  1.3     10-02  Mg     2.2     10-02    TPro  7.8  /  Alb  3.9  /  TBili  1.0  /  DBili  x   /  AST  18  /  ALT  21  /  AlkPhos  77  10-02    WBC Count: 14.33 K/uL <H> [3.80 - 10.50] (10-02 @ 09:15)    Platelet Count - Automated: 189 K/uL [150 - 400] (10-02 @ 09:15)      Culture Results:   Growth in aerobic and anaerobic bottles: Staphylococcus aureus  See previous culture [id number] (10-01 @ 01:01)  Culture Results:   <10,000 CFU/mL Normal Urogenital [place] (09-30 @ 23:10)  Culture Results:   Growth in aerobic and anaerobic bottles: Staphylococcus aureus  "Due to technical problems, Proteus sp. will Not be reported as part of  the BCID panel until further notice"  ***Blood Panel PCR results on this specimen are available  approximately 3 hours after the Gram stain result.***  Gram stain, PCR, and/or culture results may not always  correspond due to difference in methodologies.  ************************************************************  This PCR assay was performed using [employer].  The following targets are tested for: Enterococcus,  vancomycin resistant enterococci, Listeria monocytogenes,  coagulase negative staphylococci, S. aureus,  methicillin resistant S. aureus, Streptococcus agalactiae  (Group B), S. pneumoniae, S. pyogenes (Group A),  Acinetobacter baumannii, Enterobacter cloacae, E. coli,  Klebsiella oxytoca, K. pneumoniae, Proteus sp.,  Serratia marcescens, Haemophilus influenzae,  Neisseria meningitidis, Pseudomonas aeruginosa, Candida  albicans, C. glabrata, C krusei, C parapsilosis,  C. tropicalis and the KPC resistance gene. (09-30 @ 23:07)    RADIOGRAPHS: see previous dental consult note for evaluation of PAN X-ray; as per previous radiograph, no signs of acute odontogenic infection or bony pathology were noted    ASSESSMENT: Based upon the current limited clinical examination and evaluation of previous panoramic radiograph, no acute signs of an odontogenic source of infection were noted at this time    PROCEDURE:  Verbal consent given for limited dental clinical evaluation    RECOMMENDATIONS:   1) Consultation with plastic surgery or dermatology teams for further evaluation of the patient recommended  2) Dental F/U with outpatient dentist for comprehensive dental care.   3) If any difficulty swallowing/breathing, fever occur, page dental.     Radha Sarmiento DDS pager #[phone]  Oral surgeon consulted: Dr. Bernie Iyer Patient is a 44y old  Male who presents with a chief complaint of Cellulitis (02 Oct 2019 12:23)    Dental team was paged to re-assess a possible dental source of infection, as the patient had persistent lip swelling and febrile status. Please refer to previous dental consult note for further information.      HPI:  Jacques Cueto MD JOHNSON  Internal Medicine PGY-2  Pager: [facility]: [phone]; [facility] [phone]    45 yo M no pertinent past medical history presents with one day of subjective fever, lip swelling, and pain. Patient states that he has recurrent mouth sores 2-3 times a year.  He describes the lesions as mucousal "flat", "white", "circular".  He occasionally has associated fever.  These lesions usually go away on its own or if persistent resolve with use of oral gel.  Day before yesterday he noticed a similar lesion located on the inner/corner part of left lip.  He thought it was filled with puss.  Within 6-12 hours his entire lip was swollen. He also endorses subjective fevers and chills. Denies trauma to the area, no recent dental or cosmetic procedures. Remote Root canal performed over one year ago.  Patient otherwise denies having nausea, vomiting, cough, SOB, joint pain.  He denies any GI/ symptoms.  His mother had a similar incident of lip swelling long time ago.      In the ED  VS: max 101.3F, -118, -181/112-131, RR 16-18, sPO2 % RA   Received one dose of vancomycin, 2L LR bolus, Tylenol (01 Oct 2019 00:24)      PAST MEDICAL & SURGICAL HISTORY:  Asthma  Hypertension  No significant past surgical history    MEDICATIONS  (STANDING):  amLODIPine   Tablet 5 milliGRAM(s) Oral daily  cloNIDine 0.1 milliGRAM(s) Oral two times a day  fenofibrate Tablet 145 milliGRAM(s) Oral daily  influenza   Vaccine 0.5 milliLiter(s) IntraMuscular once  sodium chloride 0.9%. 1000 milliLiter(s) (100 mL/Hr) IV Continuous <Continuous>  vancomycin  IVPB 1250 milliGRAM(s) IV Intermittent every 12 hours    MEDICATIONS  (PRN):  acetaminophen   Tablet .. 650 milliGRAM(s) Oral every 6 hours PRN Temp greater or equal to 38C (100.4F)  ibuprofen  Tablet. 600 milliGRAM(s) Oral every 6 hours PRN Moderate Pain (4 - 6)  traMADol 50 milliGRAM(s) Oral every 6 hours PRN Severe Pain (7 - 10)      Allergies    No Known Allergies    Intolerances        FAMILY HISTORY:  No pertinent family history      SOCIAL HISTORY: pt presents alone at time of current examination    Last Dental Visit: unknown    Vital Signs Last 24 Hrs  T(C): 37.2 (02 Oct 2019 19:51), Max: 37.6 (02 Oct 2019 14:13)  T(F): 99 (02 Oct 2019 19:51), Max: 99.7 (02 Oct 2019 14:13)  HR: 95 (02 Oct 2019 19:51) (83 - 117)  BP: 125/77 (02 Oct 2019 19:51) (115/79 - 148/89)  BP(mean): --  RR: 18 (02 Oct 2019 19:51) (18 - 18)  SpO2: 97% (02 Oct 2019 19:51) (97% - 99%)    EOE:  TMJ ( - ) clicks                    ( -   ) pops                    ( -   ) crepitus             Mandible FROM at time of examination             Facial bones and MOM grossly intact             ( -  ) trismus             ( -  ) LAD             ( +  ) swelling: pt's upper left corner of lip presents with swelling, firmness             ( +  ) asymmetry: pt's upper left corner of lip is swollen and firm             ( +  ) palpation: left corner of lip is painful upon palpation    Pt has multiple, dried yellow crusted lesions and hemostatic clots on his upper left lip, redness of lips present              IOE:  permanent dentition: multiple missing teeth           hard/soft palate:  ( - ) palatal torus           tongue/FOM WNL           labial/buccal mucosa; inner corner of left lip presents with multiple red and flat, white patchy lesions. maxillary labial frenum and maxillary anterior buccal gingiva is red           ( -  ) percussion           ( -  ) palpation           ( -  ) swelling, inner corner of left lip presents with swelling but no gingival and no other intra oral swelling were appreciated    Dentition present: upper right molar, #6-11, #21-17  Mobility: no gross mobility appreciated at this time    Pt's lip is very swollen and firm; difficult to fully retract upper lip for better visualization of buccal vestibular area.  Pt reported that he is feeling better at the time of the current clinical examination and that he was able to tolerate a greater degree of lip retraction at the current evaluation .     Radiographs: PAN taken at previous visit; please refer to previous dental consult note    LABS:                        12.4   14.33 )-----------( 189      ( 02 Oct 2019 09:15 )             39.3     10-02    134<L>  |  98  |  20  ----------------------------<  73  4.0   |  24  |  1.14    Ca    9.2      02 Oct 2019 06:47  Phos  1.3     10-02  Mg     2.2     10-02    TPro  7.8  /  Alb  3.9  /  TBili  1.0  /  DBili  x   /  AST  18  /  ALT  21  /  AlkPhos  77  10-02    WBC Count: 14.33 K/uL <H> [3.80 - 10.50] (10-02 @ 09:15)    Platelet Count - Automated: 189 K/uL [150 - 400] (10-02 @ 09:15)      Culture Results:   Growth in aerobic and anaerobic bottles: Staphylococcus aureus  See previous culture [id number] (10-01 @ 01:01)  Culture Results:   <10,000 CFU/mL Normal Urogenital [place] (09-30 @ 23:10)  Culture Results:   Growth in aerobic and anaerobic bottles: Staphylococcus aureus  "Due to technical problems, Proteus sp. will Not be reported as part of  the BCID panel until further notice"  ***Blood Panel PCR results on this specimen are available  approximately 3 hours after the Gram stain result.***  Gram stain, PCR, and/or culture results may not always  correspond due to difference in methodologies.  ************************************************************  This PCR assay was performed using [employer]: [place].  The following targets are tested for: Enterococcus,  vancomycin resistant enterococci, Listeria monocytogenes,  coagulase negative staphylococci, S. aureus,  methicillin resistant S. aureus, Streptococcus agalactiae  (Group B), S. pneumoniae, S. pyogenes (Group A),  Acinetobacter baumannii, Enterobacter cloacae, E. coli,  Klebsiella oxytoca, K. pneumoniae, Proteus sp.,  Serratia marcescens, Haemophilus influenzae,  Neisseria meningitidis, Pseudomonas aeruginosa, Candida  albicans, C. glabrata, C krusei, C parapsilosis,  C. tropicalis and the KPC resistance gene. (09-30 @ 23:07)    RADIOGRAPHS: see previous dental consult note for evaluation of PAN X-ray; as per previous radiograph, no signs of acute odontogenic infection or bony pathology were noted    ASSESSMENT: Based upon the current limited clinical examination and evaluation of previous panoramic radiograph, no acute signs of an odontogenic source of infection were noted at this time    PROCEDURE:  Verbal consent given for limited dental clinical evaluation    RECOMMENDATIONS:   1) Consultation with plastic surgery or dermatology teams for further evaluation of the patient recommended  2) Continue antibiotic and medication regimen as per medical team  3) Dental F/U with outpatient dentist for comprehensive dental care.   4) If any difficulty swallowing/breathing, fever occur, page dental.     Radha Sarmiento DDS pager #[phone]  Oral surgeon consulted: Dr. Bernie Iyer

## 2019-10-02 NOTE — PROGRESS NOTE ADULT - PROBLEM SELECTOR PLAN 2
- Clonidine 0.1 mg PO BID up from qd  - c/w Amlodipine 5mg PO daily  - d/c lisinopril 20mg po daily due to potential reaction to ACE inhibitor resulting in angioedema Pt with SBP 140s currently, lisinopril dc'ed.  - Clonidine 0.1 mg PO BID up from qd  - c/w Amlodipine 5mg PO daily, can increase to amlodipine 10 mg today  - d/c lisinopril 20mg po daily due to potential reaction to ACE inhibitor resulting in angioedema

## 2019-10-02 NOTE — PROGRESS NOTE ADULT - CARDIOVASCULAR
infectious diseases progress note:    MAC HAMILTON is a 59y y. o. Female patient    Patient with continued diarrhea    Allergies    No Known Allergies    Intolerances        ANTIBIOTICS/RELEVANT:  antimicrobials    immunologic:    OTHER:  acetaminophen   Tablet. 650 milliGRAM(s) Oral every 6 hours PRN  loperamide 2 milliGRAM(s) Oral four times a day PRN      Objective:  Vital Signs Last 24 Hrs  T(C): 36.7 (05 Jul 2018 08:07), Max: 36.8 (04 Jul 2018 15:34)  T(F): 98 (05 Jul 2018 08:07), Max: 98.3 (04 Jul 2018 21:34)  HR: 99 (05 Jul 2018 08:07) (92 - 101)  BP: 102/67 (05 Jul 2018 08:07) (100/71 - 113/76)  BP(mean): --  RR: 18 (05 Jul 2018 08:07) (18 - 18)  SpO2: 99% (05 Jul 2018 08:07) (99% - 99%)    T(C): 36.7 (07-05-18 @ 08:07), Max: 36.8 (07-03-18 @ 15:40)  T(C): 36.7 (07-05-18 @ 08:07), Max: 37.1 (07-03-18 @ 07:43)  T(C): 36.7 (07-05-18 @ 08:07), Max: 37.1 (07-03-18 @ 07:43)    LABS:                        11.0   8.17  )-----------( 122      ( 05 Jul 2018 09:37 )             33.1       8.17 07-05 @ 09:37  5.66 07-04 @ 10:35  4.12 07-03 @ 08:12  7.17 07-02 @ 09:29  17.85 07-01 @ 08:25  35.9 06-30 @ 20:31      07-04    139  |  101  |  11  ----------------------------<  101<H>  3.7   |  25  |  0.89    Ca    9.4      04 Jul 2018 07:23    TPro  6.8  /  Alb  4.7  /  TBili  0.4  /  DBili  x   /  AST  21  /  ALT  43  /  AlkPhos  87  07-04      Creatinine, Serum: 0.89 mg/dL (07-04-18 @ 07:23)  Creatinine, Serum: 0.87 mg/dL (07-03-18 @ 07:21)  Creatinine, Serum: 0.89 mg/dL (07-02-18 @ 16:52)  Creatinine, Serum: 0.79 mg/dL (07-02-18 @ 07:07)  Creatinine, Serum: 0.95 mg/dL (07-01-18 @ 09:25)  Creatinine, Serum: 0.84 mg/dL (07-01-18 @ 06:17)  Creatinine, Serum: 1.13 mg/dL (06-30-18 @ 20:31)                MICROBIOLOGY:              RADIOLOGY & ADDITIONAL STUDIES: details… detailed exam

## 2019-10-02 NOTE — PROGRESS NOTE ADULT - SUBJECTIVE AND OBJECTIVE BOX
ALPHONSO MORRISSEY 44y [id number]    Patient is a 44y old  Male who presents with a chief complaint of Cellulitis (02 Oct 2019 08:57)      Follow Up/CC:  ID following for facial cellulitis    Interval History/ROS: face feeling better, still with swelling, fever+    Allergies    No Known Allergies    Intolerances        ANTIMICROBIALS:  acyclovir IVPB    acyclovir IVPB 370 every 8 hours  vancomycin  IVPB 1250 every 12 hours      MEDICATIONS  (STANDING):  acyclovir IVPB      acyclovir IVPB 370 milliGRAM(s) IV Intermittent every 8 hours  amLODIPine   Tablet 5 milliGRAM(s) Oral daily  cloNIDine 0.1 milliGRAM(s) Oral two times a day  fenofibrate Tablet 145 milliGRAM(s) Oral daily  influenza   Vaccine 0.5 milliLiter(s) IntraMuscular once  sodium chloride 0.9%. 1000 milliLiter(s) (100 mL/Hr) IV Continuous <Continuous>  vancomycin  IVPB 1250 milliGRAM(s) IV Intermittent every 12 hours    MEDICATIONS  (PRN):  acetaminophen   Tablet .. 650 milliGRAM(s) Oral every 6 hours PRN Temp greater or equal to 38C (100.4F)  ibuprofen  Tablet. 600 milliGRAM(s) Oral every 6 hours PRN Moderate Pain (4 - 6)  traMADol 50 milliGRAM(s) Oral every 6 hours PRN Severe Pain (7 - 10)        Vital Signs Last 24 Hrs  T(C): 37.6 (02 Oct 2019 14:13), Max: 38.6 (01 Oct 2019 16:58)  T(F): 99.7 (02 Oct 2019 14:13), Max: 101.5 (01 Oct 2019 16:58)  HR: 87 (02 Oct 2019 14:13) (83 - 117)  BP: 130/82 (02 Oct 2019 14:13) (115/79 - 156/90)  BP(mean): --  RR: 18 (02 Oct 2019 14:13) (18 - 18)  SpO2: 98% (02 Oct 2019 14:13) (96% - 98%)    CBC Full  -  ( 02 Oct 2019 09:15 )  WBC Count : 14.33 K/uL  RBC Count : 6.37 M/uL  Hemoglobin : 12.4 g/dL  Hematocrit : 39.3 %  Platelet Count - Automated : 189 K/uL  Mean Cell Volume : 61.7 fl  Mean Cell Hemoglobin : 19.5 pg  Mean Cell Hemoglobin Concentration : 31.6 gm/dL  Auto Neutrophil # : 12.41 K/uL  Auto Lymphocyte # : 0.73 K/uL  Auto Monocyte # : 1.07 K/uL  Auto Eosinophil # : 0.01 K/uL  Auto Basophil # : 0.02 K/uL  Auto Neutrophil % : 86.6 %  Auto Lymphocyte % : 5.1 %  Auto Monocyte % : 7.5 %  Auto Eosinophil % : 0.1 %  Auto Basophil % : 0.1 %    10-02    134<L>  |  98  |  20  ----------------------------<  73  4.0   |  24  |  1.14    Ca    9.2      02 Oct 2019 06:47  Phos  1.3     10-02  Mg     2.2     10-02    TPro  7.8  /  Alb  3.9  /  TBili  1.0  /  DBili  x   /  AST  18  /  ALT  21  /  AlkPhos  77  10-02    LIVER FUNCTIONS - ( 02 Oct 2019 06:47 )  Alb: 3.9 g/dL / Pro: 7.8 g/dL / ALK PHOS: 77 U/L / ALT: 21 U/L / AST: 18 U/L / GGT: x           Urinalysis Basic - ( 30 Sep 2019 20:18 )    Color: Yellow / Appearance: Clear / S.018 / pH: x  Gluc: x / Ketone: Negative  / Bili: Negative / Urobili: >8mg/dL   Blood: x / Protein: 30 mg/dL / Nitrite: Negative   Leuk Esterase: Negative / RBC: 11 /hpf / WBC 1 /HPF   Sq Epi: x / Non Sq Epi: 0 /hpf / Bacteria: Negative        MICROBIOLOGY:  .Blood  10-01-19   Growth in aerobic and anaerobic bottles: Staphylococcus aureus  See previous culture [id number]  --    Growth in aerobic bottle: Gram Positive Cocci in Clusters  Growth in anaerobic bottle: Gram Positive Cocci in Clusters      .Urine  19   <10,000 CFU/mL Normal Urogenital [place]  --  --      .Blood  19   Growth in aerobic and anaerobic bottles: Staphylococcus aureus  "Due to technical problems, Proteus sp. will Not be reported as part of  the BCID panel until further notice"  ***Blood Panel PCR results on this specimen are available  approximately 3 hours after the Gram stain result.***  Gram stain, PCR, and/or culture results may not always  correspond due to difference in methodologies.  ************************************************************  This PCR assay was performed using [employer].  The following targets are tested for: Enterococcus,  vancomycin resistant enterococci, Listeria monocytogenes,  coagulase negative staphylococci, S. aureus,  methicillin resistant S. aureus, Streptococcus agalactiae  (Group B), S. pneumoniae, S. pyogenes (Group A),  Acinetobacter baumannii, Enterobacter cloacae, E. coli,  Klebsiella oxytoca, K. pneumoniae, Proteus sp.,  Serratia marcescens, Haemophilus influenzae,  Neisseria meningitidis, Pseudomonas aeruginosa, Candida  albicans, C. glabrata, C krusei, C parapsilosis,  C. tropicalis and the KPC resistance gene.  --  Blood Culture PCR      RADIOLOGY    < from: CT Maxillofacial w/ IV Cont (19 @ 21:23) >  Mild left facial soft tissue stranding, most pronounced the level of the   left mandible and extending anterior to masseter muscle, concerning for   cellulitis.     No discrete rim-enhancing collection identified to suggest abscess at   this time. Dental hardware generates beam hardening artifact which   obscures detailed evaluation.  Tiny punctate bubble of air adjacent to   the left mandibular periosteum. Consider oral maxillofacial surgical   consultation if there is concern for dental infection.    < end of copied text >

## 2019-10-02 NOTE — PROGRESS NOTE ADULT - PROBLEM SELECTOR PLAN 1
-cont vancomycin  -dc unasyn - MRSA in bcx  -f/u hsv pcr - cont acyclovir, if pcr negative, DC acyclovir  -off unasyn

## 2019-10-03 LAB
-  AMPICILLIN/SULBACTAM: SIGNIFICANT CHANGE UP
-  CEFAZOLIN: SIGNIFICANT CHANGE UP
-  CLINDAMYCIN: SIGNIFICANT CHANGE UP
-  DAPTOMYCIN: SIGNIFICANT CHANGE UP
-  ERYTHROMYCIN: SIGNIFICANT CHANGE UP
-  GENTAMICIN: SIGNIFICANT CHANGE UP
-  LINEZOLID: SIGNIFICANT CHANGE UP
-  OXACILLIN: SIGNIFICANT CHANGE UP
-  PENICILLIN: SIGNIFICANT CHANGE UP
-  RIFAMPIN: SIGNIFICANT CHANGE UP
-  TETRACYCLINE: SIGNIFICANT CHANGE UP
-  TRIMETHOPRIM/SULFAMETHOXAZOLE: SIGNIFICANT CHANGE UP
-  VANCOMYCIN: SIGNIFICANT CHANGE UP
ANION GAP SERPL CALC-SCNC: 12 MMOL/L — SIGNIFICANT CHANGE UP (ref 5–17)
BASOPHILS # BLD AUTO: 0.03 K/UL — SIGNIFICANT CHANGE UP (ref 0–0.2)
BASOPHILS NFR BLD AUTO: 0.3 % — SIGNIFICANT CHANGE UP (ref 0–2)
BUN SERPL-MCNC: 16 MG/DL — SIGNIFICANT CHANGE UP (ref 7–23)
CALCIUM SERPL-MCNC: 9.2 MG/DL — SIGNIFICANT CHANGE UP (ref 8.4–10.5)
CHLORIDE SERPL-SCNC: 97 MMOL/L — SIGNIFICANT CHANGE UP (ref 96–108)
CO2 SERPL-SCNC: 23 MMOL/L — SIGNIFICANT CHANGE UP (ref 22–31)
CREAT SERPL-MCNC: 1.1 MG/DL — SIGNIFICANT CHANGE UP (ref 0.5–1.3)
CULTURE RESULTS: SIGNIFICANT CHANGE UP
CULTURE RESULTS: SIGNIFICANT CHANGE UP
EOSINOPHIL # BLD AUTO: 0.11 K/UL — SIGNIFICANT CHANGE UP (ref 0–0.5)
EOSINOPHIL NFR BLD AUTO: 0.9 % — SIGNIFICANT CHANGE UP (ref 0–6)
GLUCOSE SERPL-MCNC: 92 MG/DL — SIGNIFICANT CHANGE UP (ref 70–99)
HCT VFR BLD CALC: 36.8 % — LOW (ref 39–50)
HGB BLD-MCNC: 11.7 G/DL — LOW (ref 13–17)
IMM GRANULOCYTES NFR BLD AUTO: 0.4 % — SIGNIFICANT CHANGE UP (ref 0–1.5)
LYMPHOCYTES # BLD AUTO: 1.12 K/UL — SIGNIFICANT CHANGE UP (ref 1–3.3)
LYMPHOCYTES # BLD AUTO: 9.5 % — LOW (ref 13–44)
MAGNESIUM SERPL-MCNC: 2.2 MG/DL — SIGNIFICANT CHANGE UP (ref 1.6–2.6)
MCHC RBC-ENTMCNC: 19.2 PG — LOW (ref 27–34)
MCHC RBC-ENTMCNC: 31.8 GM/DL — LOW (ref 32–36)
MCV RBC AUTO: 60.3 FL — LOW (ref 80–100)
METHOD TYPE: SIGNIFICANT CHANGE UP
MONOCYTES # BLD AUTO: 0.92 K/UL — HIGH (ref 0–0.9)
MONOCYTES NFR BLD AUTO: 7.8 % — SIGNIFICANT CHANGE UP (ref 2–14)
NEUTROPHILS # BLD AUTO: 9.53 K/UL — HIGH (ref 1.8–7.4)
NEUTROPHILS NFR BLD AUTO: 81.1 % — HIGH (ref 43–77)
ORGANISM # SPEC MICROSCOPIC CNT: SIGNIFICANT CHANGE UP
PHOSPHATE SERPL-MCNC: 1.8 MG/DL — LOW (ref 2.5–4.5)
PLATELET # BLD AUTO: 218 K/UL — SIGNIFICANT CHANGE UP (ref 150–400)
POTASSIUM SERPL-MCNC: 3.8 MMOL/L — SIGNIFICANT CHANGE UP (ref 3.5–5.3)
POTASSIUM SERPL-SCNC: 3.8 MMOL/L — SIGNIFICANT CHANGE UP (ref 3.5–5.3)
RBC # BLD: 6.1 M/UL — HIGH (ref 4.2–5.8)
RBC # FLD: 15.8 % — HIGH (ref 10.3–14.5)
SODIUM SERPL-SCNC: 132 MMOL/L — LOW (ref 135–145)
SPECIMEN SOURCE: SIGNIFICANT CHANGE UP
SPECIMEN SOURCE: SIGNIFICANT CHANGE UP
VANCOMYCIN TROUGH SERPL-MCNC: 9.6 UG/ML — LOW (ref 10–20)
WBC # BLD: 11.76 K/UL — HIGH (ref 3.8–10.5)
WBC # FLD AUTO: 11.76 K/UL — HIGH (ref 3.8–10.5)

## 2019-10-03 PROCEDURE — 99233 SBSQ HOSP IP/OBS HIGH 50: CPT

## 2019-10-03 PROCEDURE — 93306 TTE W/DOPPLER COMPLETE: CPT | Mod: 26

## 2019-10-03 RX ORDER — SODIUM,POTASSIUM PHOSPHATES 278-250MG
2 POWDER IN PACKET (EA) ORAL ONCE
Refills: 0 | Status: COMPLETED | OUTPATIENT
Start: 2019-10-03 | End: 2019-10-03

## 2019-10-03 RX ORDER — VANCOMYCIN HCL 1 G
1500 VIAL (EA) INTRAVENOUS EVERY 12 HOURS
Refills: 0 | Status: DISCONTINUED | OUTPATIENT
Start: 2019-10-04 | End: 2019-10-05

## 2019-10-03 RX ORDER — VANCOMYCIN HCL 1 G
1250 VIAL (EA) INTRAVENOUS EVERY 12 HOURS
Refills: 0 | Status: DISCONTINUED | OUTPATIENT
Start: 2019-10-03 | End: 2019-10-03

## 2019-10-03 RX ORDER — VANCOMYCIN HCL 1 G
VIAL (EA) INTRAVENOUS
Refills: 0 | Status: DISCONTINUED | OUTPATIENT
Start: 2019-10-03 | End: 2019-10-05

## 2019-10-03 RX ORDER — VANCOMYCIN HCL 1 G
1500 VIAL (EA) INTRAVENOUS ONCE
Refills: 0 | Status: COMPLETED | OUTPATIENT
Start: 2019-10-03 | End: 2019-10-03

## 2019-10-03 RX ADMIN — Medication 2 PACKET(S): at 13:32

## 2019-10-03 RX ADMIN — AMLODIPINE BESYLATE 5 MILLIGRAM(S): 2.5 TABLET ORAL at 05:13

## 2019-10-03 RX ADMIN — Medication 0.1 MILLIGRAM(S): at 17:02

## 2019-10-03 RX ADMIN — Medication 300 MILLIGRAM(S): at 17:01

## 2019-10-03 RX ADMIN — Medication 62.5 MILLIMOLE(S): at 13:32

## 2019-10-03 RX ADMIN — Medication 0.1 MILLIGRAM(S): at 05:13

## 2019-10-03 RX ADMIN — Medication 145 MILLIGRAM(S): at 13:31

## 2019-10-03 RX ADMIN — Medication 300 MILLIGRAM(S): at 00:47

## 2019-10-03 NOTE — PROVIDER CONTACT NOTE (OTHER) - ACTION/TREATMENT ORDERED:
Team 2 provider made aware. no new orders for now. will re-assess in an hour and notify provider.
team 2 provider Dr. Walters notified. no new orders. Dr. Walters to follow up in AM with primary team.
team 2 provider made aware & to look into it & call back.

## 2019-10-03 NOTE — PROGRESS NOTE ADULT - PROBLEM SELECTOR PLAN 3
- Bacterial cultures returned + for MRSA bacteremia sensitive to vancomycin  - per ID, vancomycin 1.25g IV Q12

## 2019-10-03 NOTE — CONSULT NOTE PEDS - SUBJECTIVE AND OBJECTIVE BOX
See dictated note.  Consented and time out processed.  Tolerated I&D of left upper lip/mouth, culture sent.  Rec: mostly liquid diet for 1-2 days/abx  Consider discharge home on po abx soon, when clinically stable, with f/u next wk in my office as out-pt.

## 2019-10-03 NOTE — PROGRESS NOTE ADULT - ASSESSMENT
45 yo M with PMH of asthma and essential HTN presenting with sepsis 2/2 cellulitis of the upper lip and was found to have MRSA bacteremia, likely from superinfection of lip.

## 2019-10-03 NOTE — PROGRESS NOTE ADULT - PROBLEM SELECTOR PLAN 1
Met sepsis criteria with leukocytosis, fevers and source of  non-purulent cellulitis of the upper lip, likely due to superinfection from mouth sore. Low suspicion for etiology of symptoms due to angioedema 2/2 ACE inhibitor use.   Currently protecting airway, low concern for compromise  - Per dental, no interventions at time of presentation  - s/p Vancomycin in ED  - per ID, vancomycin 1.25g IV Q12  - acetaminophen for temp greater or equal to 38C

## 2019-10-03 NOTE — PROGRESS NOTE ADULT - PROBLEM SELECTOR PLAN 2
CT oral maxillofacial with Mild left facial soft tissue swelling concerning for cellulitis with no evidence of abscess on 9/30  - HSV swab of lesion negative  - unclear if oral mucosal lesions are aphthous ulcer or 2/2 autoimmune disease (i.e SLE, Behcet's), although no other systemic signs/symptoms present; Nevertheless, will check   - SOUTH, dsDNA, CCP, HIV and Lyme negative  - RF elevated to 17  - per ID, vancomycin 1.25g IV Q12  - ibuprofen and tramadol PRN for pain  - f/u plastic surgery consult

## 2019-10-03 NOTE — PROGRESS NOTE ADULT - PROBLEM SELECTOR PLAN 4
Pt with SBP 140s currently, lisinopril dc'ed.  - Clonidine 0.1 mg PO BID up from qd  - c/w Amlodipine 5mg PO daily, can increase to amlodipine 10 mg today  - d/c lisinopril 20mg po daily due to potential reaction to ACE inhibitor resulting in angioedema

## 2019-10-03 NOTE — PROGRESS NOTE ADULT - ASSESSMENT
45 yo M with PMH of asthma and essential HTN presenting with fever, leukocytosis, oral sores with sepsis 2/2 cellulitis of the upper lip.

## 2019-10-03 NOTE — PROGRESS NOTE ADULT - SUBJECTIVE AND OBJECTIVE BOX
ALPHONSO MORRISSEY 44y [id number]    Patient is a 44y old  Male who presents with a chief complaint of Cellulitis (03 Oct 2019 09:43)      Follow Up/CC:  ID following for bacteremia    Interval History/ROS: feels better, fever+    Allergies    No Known Allergies    Intolerances        ANTIMICROBIALS:      MEDICATIONS  (STANDING):  amLODIPine   Tablet 5 milliGRAM(s) Oral daily  cloNIDine 0.1 milliGRAM(s) Oral two times a day  fenofibrate Tablet 145 milliGRAM(s) Oral daily  influenza   Vaccine 0.5 milliLiter(s) IntraMuscular once  potassium phosphate / sodium phosphate powder 2 Packet(s) Oral once  sodium chloride 0.9%. 1000 milliLiter(s) (100 mL/Hr) IV Continuous <Continuous>  sodium phosphate IVPB 15 milliMole(s) IV Intermittent once    MEDICATIONS  (PRN):  acetaminophen   Tablet .. 650 milliGRAM(s) Oral every 6 hours PRN Temp greater or equal to 38C (100.4F)  ibuprofen  Tablet. 600 milliGRAM(s) Oral every 6 hours PRN Moderate Pain (4 - 6)  traMADol 50 milliGRAM(s) Oral every 6 hours PRN Severe Pain (7 - 10)        Vital Signs Last 24 Hrs  T(C): 38.1 (03 Oct 2019 08:15), Max: 38.1 (03 Oct 2019 08:15)  T(F): 100.5 (03 Oct 2019 08:15), Max: 100.5 (03 Oct 2019 08:15)  HR: 105 (03 Oct 2019 08:15) (84 - 105)  BP: 140/94 (03 Oct 2019 08:15) (119/81 - 140/94)  BP(mean): --  RR: 18 (03 Oct 2019 08:15) (18 - 18)  SpO2: 97% (03 Oct 2019 08:15) (96% - 99%)    CBC Full  -  ( 03 Oct 2019 09:15 )  WBC Count : 11.76 K/uL  RBC Count : 6.10 M/uL  Hemoglobin : 11.7 g/dL  Hematocrit : 36.8 %  Platelet Count - Automated : 218 K/uL  Mean Cell Volume : 60.3 fl  Mean Cell Hemoglobin : 19.2 pg  Mean Cell Hemoglobin Concentration : 31.8 gm/dL  Auto Neutrophil # : 9.53 K/uL  Auto Lymphocyte # : 1.12 K/uL  Auto Monocyte # : 0.92 K/uL  Auto Eosinophil # : 0.11 K/uL  Auto Basophil # : 0.03 K/uL  Auto Neutrophil % : 81.1 %  Auto Lymphocyte % : 9.5 %  Auto Monocyte % : 7.8 %  Auto Eosinophil % : 0.9 %  Auto Basophil % : 0.3 %    10-03    132<L>  |  97  |  16  ----------------------------<  92  3.8   |  23  |  1.10    Ca    9.2      03 Oct 2019 07:12  Phos  1.8     10-03  Mg     2.2     10-03    TPro  7.8  /  Alb  3.9  /  TBili  1.0  /  DBili  x   /  AST  18  /  ALT  21  /  AlkPhos  77  10-02    LIVER FUNCTIONS - ( 02 Oct 2019 06:47 )  Alb: 3.9 g/dL / Pro: 7.8 g/dL / ALK PHOS: 77 U/L / ALT: 21 U/L / AST: 18 U/L / GGT: x               MICROBIOLOGY:  .Blood  10-01-19   Growth in aerobic and anaerobic bottles: Methicillin resistant  Staphylococcus aureus  See previous culture [id number]  --    Growth in aerobic bottle: Gram Positive Cocci in Clusters  Growth in anaerobic bottle: Gram Positive Cocci in Clusters      .Urine  09-30-19   <10,000 CFU/mL Normal Urogenital [place]  --  --      .Blood  09-30-19   Growth in aerobic and anaerobic bottles: Methicillin resistant  Staphylococcus aureus  "Due to technical problems, Proteus sp. will Not be reported as part of  the BCID panel until further notice"  ***Blood Panel PCR results on this specimen are available  approximately 3 hours after the Gram stain result.***  Gram stain, PCR, and/or culture results may not always  correspond due to difference in methodologies.  ************************************************************  This PCR assay was performed using [employer].  The following targets are tested for: Enterococcus,  vancomycin resistant enterococci, Listeria monocytogenes,  coagulase negative staphylococci, S. aureus,  methicillin resistant S. aureus, Streptococcus agalactiae  (Group B), S. pneumoniae, S. pyogenes (Group A),  Acinetobacter baumannii, Enterobacter cloacae, E. coli,  Klebsiella oxytoca, K. pneumoniae, Proteus sp.,  Serratia marcescens, Haemophilus influenzae,  Neisseria meningitidis, Pseudomonas aeruginosa, Candida  albicans, C. glabrata, C krusei, C parapsilosis,  C. tropicalis and the KPC resistance gene.  --  Blood Culture PCR  Methicillin resistant Staphylococcus aureus              Vancomycin Level, Trough: 7.6 ug/mL (10-02-19 @ 21:03)  v            RADIOLOGY

## 2019-10-03 NOTE — PROGRESS NOTE ADULT - SUBJECTIVE AND OBJECTIVE BOX
ALPHONSO MORRISSEYy Male    Medicine Team 2  Iona Delatorre MS4  Pager [phone]    INTERVAL HPI/OVERNIGHT EVENTS: Pt has no overnight events. He states that the discomfort in his lip has decreased to a 4-5/10 today. He says that he can open his mouth wider than he previously could. He tried using warm compresses on his lip yesterday that did not improve the symptoms. He denies any fever, chills, nausea, vomiting, diarrhea, or dysuria. He is eating and sleeping well. He continues to have regular bowel movements. Pt states he was originally supposed to have a dental appointment today that he will reschedule for after discharge.       REVIEW OF SYSTEMS:  CONSTITUTIONAL: no fever, weight loss, or fatigue  EYES: No eye pain, visual disturbances, or discharge  ENMT:  No difficulty hearing, tinnitus, vertigo; No sinus or throat pain, +swollen lip described as a "discomfort" and "dull pressure" decreased in discomfort from yesterday  NECK: No pain or stiffness  RESPIRATORY: No cough or wheezing; No shortness of breath  CARDIOVASCULAR: No chest pain, palpitations, dizziness, or leg swelling  GASTROINTESTINAL: No abdominal or epigastric pain. No diarrhea or constipation.  GENITOURINARY: No dysuria, frequency, hematuria, or incontinence  NEUROLOGICAL: No headaches, memory loss, loss of strength, numbness, or tremors  SKIN: No itching, burning, rashes, or lesions   MUSCULOSKELETAL: No joint pain or swelling; No muscle, back, or extremity pain  ALLERGY AND IMMUNOLOGIC: No hives or eczema    FAMILY HISTORY:  Mother had history of swollen lip   Father had history of HTN     Vital Signs Last 24 Hrs  T(C): 38.1 (03 Oct 2019 08:15), Max: 38.1 (03 Oct 2019 08:15)  T(F): 100.5 (03 Oct 2019 08:15), Max: 100.5 (03 Oct 2019 08:15)  HR: 105 (03 Oct 2019 08:15) (84 - 105)  BP: 140/94 (03 Oct 2019 08:15) (119/81 - 140/94)  BP(mean): --  RR: 18 (03 Oct 2019 08:15) (18 - 18)  SpO2: 97% (03 Oct 2019 08:15) (96% - 99%)    PHYSICAL EXAM:  GENERAL: NAD, well-groomed, well-developed  HEAD:  Atraumatic, Normocephalic  EYES: EOMI, conjunctiva and sclera clear  ENMT: Moist mucous membranes, +missing upper and lower teeth on the sides, +swollen upper lip mainly on the left spreading slightly past midline that is dark in color and hard to the touch, small pinpoint white pustule towards center of lesion and one towards left side of lip +black circular lesion in left corner of mouth between lips that was white in color yesterday  NECK: Supple, No JVD  NERVOUS SYSTEM:  Alert & Oriented X3, Good concentration; Motor Strength 5/5 B/L upper and lower extremities  CHEST/LUNG: Clear to percussion bilaterally; No rales, rhonchi, wheezing, or rubs  HEART: Regular rate and rhythm  ABDOMEN: Soft, Nontender, Nondistended; Bowel sounds present  EXTREMITIES:  2+ Peripheral Pulses, No clubbing, cyanosis, or edema  LYMPH: No lymphadenopathy noted  SKIN: No rashes or lesions    Consultant(s) Notes Reviewed:  [x ] YES  [ ] NO  Care Discussed with Consultants/Other Providers [ x] YES  [ ] NO      LABS:                        12.4   14.33 )-----------( 189      ( 02 Oct 2019 09:15 )             39.3     10-03    132<L>  |  97  |  16  ----------------------------<  92  3.8   |  23  |  1.10    Ca    9.2      03 Oct 2019 07:12  Phos  1.8     10-03  Mg     2.2     10-03    TPro  7.8  /  Alb  3.9  /  TBili  1.0  /  DBili  x   /  AST  18  /  ALT  21  /  AlkPhos  77  10-02        Urine Cultures 9/30 23:10: wnl    Blood Cultures 9/30 23:07:   - Growth in anaerobic bottle: gram + cocci in pairs  - Growth in aerobic bottle: gram + cocci in clusters  - Gram Stain: MRSA    Blood Cultures 10/1 01:01:  - Growth in anaerobic bottle: gram + cocci in clusters  - Growth in aerobic bottle: gram + cocci in clusters    -MRSA susceptible to vancomycin  - HSV not detected on lesion  - dsDNA, CCP, and anti nuclear factor titer wnl      RADIOLOGY & ADDITIONAL TESTS:  EKG 9/30 20:57  Ventricular Rate 104 BPM, Atrial Rate 104 BPM, P-R Interval 170 ms, QRS Duration 78 ms, Q-T Interval 340 ms, QTC Calculation(Bezet) 447 ms, P Axis 38 degrees, R Axis 4 degrees, T Axis 21 degrees, Diagnosis Line SINUS TACHYCARDIA, MINIMAL VOLTAGE CRITERIA FOR LVH, MAY BE NORMAL VARIANT, BORDERLINE ECG    CT Maxillofacial w/ IV contrast 9/30 21:23  IMPRESSION:    Mild left facial soft tissue stranding, most pronounced the level of the left mandible and extending anterior to masseter muscle, concerning for cellulitis.   No discrete rim-enhancing collection identified to suggest abscess at this time. Dental hardware generates beam hardening artifact which obscures detailed evaluation.  Tiny punctate bubble of air adjacent to the left mandibular periosteum. Consider oral maxillofacial surgical consultation if there is concern for dental infection.    Imaging Personally Reviewed:  [X] YES  [ ] NO      MEDICATIONS  (STANDING):  amLODIPine   Tablet 5 milliGRAM(s) Oral daily  cloNIDine 0.1 milliGRAM(s) Oral two times a day  fenofibrate Tablet 145 milliGRAM(s) Oral daily  influenza   Vaccine 0.5 milliLiter(s) IntraMuscular once  potassium phosphate / sodium phosphate powder 2 Packet(s) Oral once  sodium chloride 0.9%. 1000 milliLiter(s) (100 mL/Hr) IV Continuous <Continuous>  sodium phosphate IVPB 15 milliMole(s) IV Intermittent once    MEDICATIONS  (PRN):  acetaminophen   Tablet .. 650 milliGRAM(s) Oral every 6 hours PRN Temp greater or equal to 38C (100.4F)  ibuprofen  Tablet. 600 milliGRAM(s) Oral every 6 hours PRN Moderate Pain (4 - 6)  traMADol 50 milliGRAM(s) Oral every 6 hours PRN Severe Pain (7 - 10) ALPHONSO MORRISSEYy Male    Medicine Team 2  Iona Delatorre MS4  Pager [phone]    INTERVAL HPI/OVERNIGHT EVENTS: Pt has no overnight events. He states that the discomfort in his lip has decreased to a 4-5/10 today. He says that he can open his mouth wider than he previously could. He tried using warm compresses on his lip yesterday that did not improve the symptoms. He denies any fever, chills, nausea, vomiting, diarrhea, or dysuria. He is eating and sleeping well. He continues to have regular bowel movements. Pt states he was originally supposed to have a dental appointment today that he will reschedule for after discharge.       REVIEW OF SYSTEMS:  CONSTITUTIONAL: no fever, weight loss, or fatigue  EYES: No eye pain, visual disturbances, or discharge  ENMT:  No difficulty hearing, tinnitus, vertigo; No sinus or throat pain, +swollen lip described as a "discomfort" and "dull pressure" decreased in discomfort from yesterday  NECK: No pain or stiffness  RESPIRATORY: No cough or wheezing; No shortness of breath  CARDIOVASCULAR: No chest pain, palpitations, dizziness, or leg swelling  GASTROINTESTINAL: No abdominal or epigastric pain. No diarrhea or constipation.  GENITOURINARY: No dysuria, frequency, hematuria, or incontinence  NEUROLOGICAL: No headaches, memory loss, loss of strength, numbness, or tremors  SKIN: No itching, burning, rashes, or lesions   MUSCULOSKELETAL: No joint pain or swelling; No muscle, back, or extremity pain  ALLERGY AND IMMUNOLOGIC: No hives or eczema    FAMILY HISTORY:  Mother had history of swollen lip   Father had history of HTN     Vital Signs Last 24 Hrs  T(C): 38.1 (03 Oct 2019 08:15), Max: 38.1 (03 Oct 2019 08:15)  T(F): 100.5 (03 Oct 2019 08:15), Max: 100.5 (03 Oct 2019 08:15)  HR: 105 (03 Oct 2019 08:15) (84 - 105)  BP: 140/94 (03 Oct 2019 08:15) (119/81 - 140/94)  BP(mean): --  RR: 18 (03 Oct 2019 08:15) (18 - 18)  SpO2: 97% (03 Oct 2019 08:15) (96% - 99%)    PHYSICAL EXAM:  GENERAL: NAD, well-groomed, well-developed  HEAD:  Atraumatic, Normocephalic  EYES: EOMI, conjunctiva and sclera clear  ENMT: Moist mucous membranes, +missing upper and lower teeth on the sides, +swollen upper lip mainly on the left spreading slightly past midline that is dark in color and hard to the touch, small pinpoint white pustule towards center of lesion and one towards left side of lip +black circular lesion in left corner of mouth between lips that was white in color yesterday  NECK: Supple, No JVD  NERVOUS SYSTEM:  Alert & Oriented X3, Good concentration; Motor Strength 5/5 B/L upper and lower extremities  CHEST/LUNG: Clear to percussion bilaterally; No rales, rhonchi, wheezing, or rubs  HEART: Regular rate and rhythm  ABDOMEN: Soft, Nontender, Nondistended; Bowel sounds present  EXTREMITIES:  2+ Peripheral Pulses, No clubbing, cyanosis, or edema  LYMPH: No lymphadenopathy noted  SKIN: No rashes or lesions    Consultant(s) Notes Reviewed:  [x ] YES  [ ] NO  Care Discussed with Consultants/Other Providers [ x] YES  [ ] NO      LABS:                        11.7   11.76 )-----------( 218      ( 03 Oct 2019 09:15 )             36.8     10-03    132<L>  |  97  |  16  ----------------------------<  92  3.8   |  23  |  1.10    Ca    9.2      03 Oct 2019 07:12  Phos  1.8     10-03  Mg     2.2     10-03    TPro  7.8  /  Alb  3.9  /  TBili  1.0  /  DBili  x   /  AST  18  /  ALT  21  /  AlkPhos  77  10-02        Urine Cultures 9/30 23:10: wnl    Blood Cultures 9/30 23:07:   - Growth in anaerobic bottle: gram + cocci in pairs  - Growth in aerobic bottle: gram + cocci in clusters  - Gram Stain: MRSA    Blood Cultures 10/1 01:01:  - Growth in anaerobic bottle: gram + cocci in clusters  - Growth in aerobic bottle: gram + cocci in clusters    -MRSA susceptible to vancomycin  - HSV not detected on lesion  - dsDNA, CCP, and anti nuclear factor titer wnl      RADIOLOGY & ADDITIONAL TESTS:  EKG 9/30 20:57  Ventricular Rate 104 BPM, Atrial Rate 104 BPM, P-R Interval 170 ms, QRS Duration 78 ms, Q-T Interval 340 ms, QTC Calculation(Bezet) 447 ms, P Axis 38 degrees, R Axis 4 degrees, T Axis 21 degrees, Diagnosis Line SINUS TACHYCARDIA, MINIMAL VOLTAGE CRITERIA FOR LVH, MAY BE NORMAL VARIANT, BORDERLINE ECG    CT Maxillofacial w/ IV contrast 9/30 21:23  IMPRESSION:    Mild left facial soft tissue stranding, most pronounced the level of the left mandible and extending anterior to masseter muscle, concerning for cellulitis.   No discrete rim-enhancing collection identified to suggest abscess at this time. Dental hardware generates beam hardening artifact which obscures detailed evaluation.  Tiny punctate bubble of air adjacent to the left mandibular periosteum. Consider oral maxillofacial surgical consultation if there is concern for dental infection.    Imaging Personally Reviewed:  [X] YES  [ ] NO      MEDICATIONS  (STANDING):  amLODIPine   Tablet 5 milliGRAM(s) Oral daily  cloNIDine 0.1 milliGRAM(s) Oral two times a day  fenofibrate Tablet 145 milliGRAM(s) Oral daily  influenza   Vaccine 0.5 milliLiter(s) IntraMuscular once  potassium phosphate / sodium phosphate powder 2 Packet(s) Oral once  sodium chloride 0.9%. 1000 milliLiter(s) (100 mL/Hr) IV Continuous <Continuous>  sodium phosphate IVPB 15 milliMole(s) IV Intermittent once    MEDICATIONS  (PRN):  acetaminophen   Tablet .. 650 milliGRAM(s) Oral every 6 hours PRN Temp greater or equal to 38C (100.4F)  ibuprofen  Tablet. 600 milliGRAM(s) Oral every 6 hours PRN Moderate Pain (4 - 6)  traMADol 50 milliGRAM(s) Oral every 6 hours PRN Severe Pain (7 - 10) ALPHONSO MORRISSEYy Male    Medicine Team 2  Iona Delatorre MS4  Pager [phone]    INTERVAL HPI/OVERNIGHT EVENTS: Pt has no overnight events. He states that the discomfort in his lip has decreased to a 4-5/10 today. He says that he can open his mouth wider than he previously could. He tried using warm compresses on his lip yesterday that did not improve the symptoms. He denies any fever, chills, nausea, vomiting, diarrhea, or dysuria. He is eating and sleeping well. He continues to have regular bowel movements. Pt states he was originally supposed to have a dental appointment today that he will reschedule for after discharge.       REVIEW OF SYSTEMS:  CONSTITUTIONAL: no fever, weight loss, or fatigue  EYES: No eye pain, visual disturbances, or discharge  ENMT:  No difficulty hearing, tinnitus, vertigo; No sinus or throat pain, +swollen lip described as a "discomfort" and "dull pressure" decreased in discomfort from yesterday  NECK: No pain or stiffness  RESPIRATORY: No cough or wheezing; No shortness of breath  CARDIOVASCULAR: No chest pain, palpitations, dizziness, or leg swelling  GASTROINTESTINAL: No abdominal or epigastric pain. No diarrhea or constipation.  GENITOURINARY: No dysuria, frequency, hematuria, or incontinence  NEUROLOGICAL: No headaches, memory loss, loss of strength, numbness, or tremors  SKIN: No itching, burning, rashes, or lesions   MUSCULOSKELETAL: No joint pain or swelling; No muscle, back, or extremity pain  ALLERGY AND IMMUNOLOGIC: No hives or eczema    FAMILY HISTORY:  Mother had history of swollen lip   Father had history of HTN     Vital Signs Last 24 Hrs  T(C): 38.1 (03 Oct 2019 08:15), Max: 38.1 (03 Oct 2019 08:15)  T(F): 100.5 (03 Oct 2019 08:15), Max: 100.5 (03 Oct 2019 08:15)  HR: 105 (03 Oct 2019 08:15) (84 - 105)  BP: 140/94 (03 Oct 2019 08:15) (119/81 - 140/94)  BP(mean): --  RR: 18 (03 Oct 2019 08:15) (18 - 18)  SpO2: 97% (03 Oct 2019 08:15) (96% - 99%)    PHYSICAL EXAM:  GENERAL: NAD, well-groomed, well-developed  HEAD:  Atraumatic, Normocephalic  EYES: EOMI, conjunctiva and sclera clear  ENMT: Moist mucous membranes, +missing upper and lower teeth on the sides, +swollen upper lip mainly on the left spreading slightly past midline that is dark in color and hard to the touch, small pinpoint white pustule towards center of lesion and one towards left side of lip +black circular lesion in left corner of mouth between lips that was white in color on original presentation  NECK: Supple, No JVD  NERVOUS SYSTEM:  Alert & Oriented X3, Good concentration; Motor Strength 5/5 B/L upper and lower extremities  CHEST/LUNG: Clear to percussion bilaterally; No rales, rhonchi, wheezing, or rubs  HEART: Regular rate and rhythm  ABDOMEN: Soft, Nontender, Nondistended; Bowel sounds present  EXTREMITIES:  2+ Peripheral Pulses, No clubbing, cyanosis, or edema  LYMPH: No lymphadenopathy noted  SKIN: No rashes or lesions    Consultant(s) Notes Reviewed:  [x ] YES  [ ] NO  Care Discussed with Consultants/Other Providers [ x] YES  [ ] NO      LABS:                        11.7   11.76 )-----------( 218      ( 03 Oct 2019 09:15 )             36.8     10-03    132<L>  |  97  |  16  ----------------------------<  92  3.8   |  23  |  1.10    Ca    9.2      03 Oct 2019 07:12  Phos  1.8     10-03  Mg     2.2     10-03    TPro  7.8  /  Alb  3.9  /  TBili  1.0  /  DBili  x   /  AST  18  /  ALT  21  /  AlkPhos  77  10-02        Urine Cultures 9/30 23:10: wnl    Blood Cultures 9/30 23:07:   - Growth in anaerobic bottle: gram + cocci in pairs  - Growth in aerobic bottle: gram + cocci in clusters  - Gram Stain: MRSA    Blood Cultures 10/1 01:01:  - Growth in anaerobic bottle: gram + cocci in clusters  - Growth in aerobic bottle: gram + cocci in clusters    -MRSA susceptible to vancomycin  - HSV not detected on lesion  - dsDNA, CCP, and anti nuclear factor titer wnl      RADIOLOGY & ADDITIONAL TESTS:  EKG 9/30 20:57  Ventricular Rate 104 BPM, Atrial Rate 104 BPM, P-R Interval 170 ms, QRS Duration 78 ms, Q-T Interval 340 ms, QTC Calculation(Bezet) 447 ms, P Axis 38 degrees, R Axis 4 degrees, T Axis 21 degrees, Diagnosis Line SINUS TACHYCARDIA, MINIMAL VOLTAGE CRITERIA FOR LVH, MAY BE NORMAL VARIANT, BORDERLINE ECG    CT Maxillofacial w/ IV contrast 9/30 21:23  IMPRESSION:    Mild left facial soft tissue stranding, most pronounced the level of the left mandible and extending anterior to masseter muscle, concerning for cellulitis.   No discrete rim-enhancing collection identified to suggest abscess at this time. Dental hardware generates beam hardening artifact which obscures detailed evaluation.  Tiny punctate bubble of air adjacent to the left mandibular periosteum. Consider oral maxillofacial surgical consultation if there is concern for dental infection.    Imaging Personally Reviewed:  [X] YES  [ ] NO      MEDICATIONS  (STANDING):  amLODIPine   Tablet 5 milliGRAM(s) Oral daily  cloNIDine 0.1 milliGRAM(s) Oral two times a day  fenofibrate Tablet 145 milliGRAM(s) Oral daily  influenza   Vaccine 0.5 milliLiter(s) IntraMuscular once  potassium phosphate / sodium phosphate powder 2 Packet(s) Oral once  sodium chloride 0.9%. 1000 milliLiter(s) (100 mL/Hr) IV Continuous <Continuous>  sodium phosphate IVPB 15 milliMole(s) IV Intermittent once    MEDICATIONS  (PRN):  acetaminophen   Tablet .. 650 milliGRAM(s) Oral every 6 hours PRN Temp greater or equal to 38C (100.4F)  ibuprofen  Tablet. 600 milliGRAM(s) Oral every 6 hours PRN Moderate Pain (4 - 6)  traMADol 50 milliGRAM(s) Oral every 6 hours PRN Severe Pain (7 - 10) ALPHONSO MORRISSEY Male    Medicine Team 2  Iona Delatorre MS4  Pager [phone]    INTERVAL HPI/OVERNIGHT EVENTS: Pt has no overnight events. He states that the discomfort in his lip has decreased to a 4-5/10 today. He says that he can open his mouth wider than he previously could. He tried using warm compresses on his lip yesterday that did not improve the symptoms. He denies any fever, chills, nausea, vomiting, diarrhea, or dysuria. He is eating and sleeping well. He continues to have regular bowel movements. Pt states he was originally supposed to have a dental appointment today that he will reschedule for after discharge.       REVIEW OF SYSTEMS:  CONSTITUTIONAL: no fever overnight, weight loss, or fatigue  EYES: No eye pain, visual disturbances, or discharge  ENMT:  No difficulty hearing, tinnitus, vertigo; No sinus or throat pain, +swollen lip described as a "discomfort" and "dull pressure" decreased in discomfort from yesterday  NECK: No pain or stiffness  RESPIRATORY: No cough or wheezing; No shortness of breath  CARDIOVASCULAR: No chest pain, palpitations, dizziness, or leg swelling  GASTROINTESTINAL: No abdominal or epigastric pain. No diarrhea or constipation.  GENITOURINARY: No dysuria, frequency, hematuria, or incontinence  NEUROLOGICAL: No headaches, memory loss, loss of strength, numbness, or tremors  SKIN: No itching, burning, rashes, or lesions   MUSCULOSKELETAL: No joint pain or swelling; No muscle, back, or extremity pain  ALLERGY AND IMMUNOLOGIC: No hives or eczema    FAMILY HISTORY:  Mother had history of swollen lip   Father had history of HTN     Vital Signs Last 24 Hrs  T(C): 38.1 (03 Oct 2019 08:15), Max: 38.1 (03 Oct 2019 08:15)  T(F): 100.5 (03 Oct 2019 08:15), Max: 100.5 (03 Oct 2019 08:15)  HR: 105 (03 Oct 2019 08:15) (84 - 105)  BP: 140/94 (03 Oct 2019 08:15) (119/81 - 140/94)  BP(mean): --  RR: 18 (03 Oct 2019 08:15) (18 - 18)  SpO2: 97% (03 Oct 2019 08:15) (96% - 99%)    PHYSICAL EXAM:  GENERAL: NAD, well-groomed, well-developed  HEAD:  Atraumatic, Normocephalic  EYES: EOMI, conjunctiva and sclera clear  ENMT: Moist mucous membranes, +missing upper and lower teeth on the sides, +swollen upper lip mainly on the left spreading slightly past midline that is dark in color and hard to the touch, small pinpoint white pustule towards center of lesion and one towards left side of lip +black circular lesion in left corner of mouth between lips that was white in color on original presentation  NECK: Supple, No JVD  NERVOUS SYSTEM:  Alert & Oriented X3, Good concentration; Motor Strength 5/5 B/L upper and lower extremities  CHEST/LUNG: Clear to percussion bilaterally; No rales, rhonchi, wheezing, or rubs  HEART: Regular rate and rhythm  ABDOMEN: Soft, Nontender, Nondistended; Bowel sounds present  EXTREMITIES:  2+ Peripheral Pulses, No clubbing, cyanosis, or edema  LYMPH: No lymphadenopathy noted  SKIN: No rashes or lesions    Consultant(s) Notes Reviewed:  [x ] YES  [ ] NO  Care Discussed with Consultants/Other Providers [ x] YES  [ ] NO      LABS:                        11.7   11.76 )-----------( 218      ( 03 Oct 2019 09:15 )             36.8     10-03    132<L>  |  97  |  16  ----------------------------<  92  3.8   |  23  |  1.10    Ca    9.2      03 Oct 2019 07:12  Phos  1.8     10-03  Mg     2.2     10-03    TPro  7.8  /  Alb  3.9  /  TBili  1.0  /  DBili  x   /  AST  18  /  ALT  21  /  AlkPhos  77  10-02        Urine Cultures 9/30 23:10: wnl    Blood Cultures 9/30 23:07:   - Growth in anaerobic bottle: gram + cocci in pairs  - Growth in aerobic bottle: gram + cocci in clusters  - Gram Stain: MRSA    Blood Cultures 10/1 01:01:  - Growth in anaerobic bottle: gram + cocci in clusters  - Growth in aerobic bottle: gram + cocci in clusters    -MRSA susceptible to vancomycin  - HSV not detected on lesion  - dsDNA, CCP, and anti nuclear factor titer wnl      RADIOLOGY & ADDITIONAL TESTS:  EKG 9/30 20:57  Ventricular Rate 104 BPM, Atrial Rate 104 BPM, P-R Interval 170 ms, QRS Duration 78 ms, Q-T Interval 340 ms, QTC Calculation(Bezet) 447 ms, P Axis 38 degrees, R Axis 4 degrees, T Axis 21 degrees, Diagnosis Line SINUS TACHYCARDIA, MINIMAL VOLTAGE CRITERIA FOR LVH, MAY BE NORMAL VARIANT, BORDERLINE ECG    CT Maxillofacial w/ IV contrast 9/30 21:23  IMPRESSION:    Mild left facial soft tissue stranding, most pronounced the level of the left mandible and extending anterior to masseter muscle, concerning for cellulitis.   No discrete rim-enhancing collection identified to suggest abscess at this time. Dental hardware generates beam hardening artifact which obscures detailed evaluation.  Tiny punctate bubble of air adjacent to the left mandibular periosteum. Consider oral maxillofacial surgical consultation if there is concern for dental infection.    Imaging Personally Reviewed:  [X] YES  [ ] NO      MEDICATIONS  (STANDING):  amLODIPine   Tablet 5 milliGRAM(s) Oral daily  cloNIDine 0.1 milliGRAM(s) Oral two times a day  fenofibrate Tablet 145 milliGRAM(s) Oral daily  influenza   Vaccine 0.5 milliLiter(s) IntraMuscular once  potassium phosphate / sodium phosphate powder 2 Packet(s) Oral once  sodium chloride 0.9%. 1000 milliLiter(s) (100 mL/Hr) IV Continuous <Continuous>  sodium phosphate IVPB 15 milliMole(s) IV Intermittent once    MEDICATIONS  (PRN):  acetaminophen   Tablet .. 650 milliGRAM(s) Oral every 6 hours PRN Temp greater or equal to 38C (100.4F)  ibuprofen  Tablet. 600 milliGRAM(s) Oral every 6 hours PRN Moderate Pain (4 - 6)  traMADol 50 milliGRAM(s) Oral every 6 hours PRN Severe Pain (7 - 10)

## 2019-10-04 LAB
ANION GAP SERPL CALC-SCNC: 12 MMOL/L — SIGNIFICANT CHANGE UP (ref 5–17)
BUN SERPL-MCNC: 13 MG/DL — SIGNIFICANT CHANGE UP (ref 7–23)
CALCIUM SERPL-MCNC: 8.8 MG/DL — SIGNIFICANT CHANGE UP (ref 8.4–10.5)
CHLORIDE SERPL-SCNC: 97 MMOL/L — SIGNIFICANT CHANGE UP (ref 96–108)
CO2 SERPL-SCNC: 25 MMOL/L — SIGNIFICANT CHANGE UP (ref 22–31)
CREAT SERPL-MCNC: 1.06 MG/DL — SIGNIFICANT CHANGE UP (ref 0.5–1.3)
GLUCOSE SERPL-MCNC: 96 MG/DL — SIGNIFICANT CHANGE UP (ref 70–99)
HCT VFR BLD CALC: 35 % — LOW (ref 39–50)
HGB BLD-MCNC: 11.1 G/DL — LOW (ref 13–17)
MAGNESIUM SERPL-MCNC: 2.2 MG/DL — SIGNIFICANT CHANGE UP (ref 1.6–2.6)
MCHC RBC-ENTMCNC: 19 PG — LOW (ref 27–34)
MCHC RBC-ENTMCNC: 31.7 GM/DL — LOW (ref 32–36)
MCV RBC AUTO: 59.9 FL — LOW (ref 80–100)
PHOSPHATE SERPL-MCNC: 2.2 MG/DL — LOW (ref 2.5–4.5)
PLATELET # BLD AUTO: 230 K/UL — SIGNIFICANT CHANGE UP (ref 150–400)
POTASSIUM SERPL-MCNC: 3.8 MMOL/L — SIGNIFICANT CHANGE UP (ref 3.5–5.3)
POTASSIUM SERPL-SCNC: 3.8 MMOL/L — SIGNIFICANT CHANGE UP (ref 3.5–5.3)
RBC # BLD: 5.84 M/UL — HIGH (ref 4.2–5.8)
RBC # FLD: 15.7 % — HIGH (ref 10.3–14.5)
SODIUM SERPL-SCNC: 134 MMOL/L — LOW (ref 135–145)
WBC # BLD: 10.95 K/UL — HIGH (ref 3.8–10.5)
WBC # FLD AUTO: 10.95 K/UL — HIGH (ref 3.8–10.5)

## 2019-10-04 PROCEDURE — 99233 SBSQ HOSP IP/OBS HIGH 50: CPT | Mod: GC

## 2019-10-04 PROCEDURE — 99232 SBSQ HOSP IP/OBS MODERATE 35: CPT

## 2019-10-04 RX ORDER — SODIUM,POTASSIUM PHOSPHATES 278-250MG
2 POWDER IN PACKET (EA) ORAL ONCE
Refills: 0 | Status: COMPLETED | OUTPATIENT
Start: 2019-10-04 | End: 2019-10-04

## 2019-10-04 RX ORDER — AMLODIPINE BESYLATE 2.5 MG/1
10 TABLET ORAL DAILY
Refills: 0 | Status: DISCONTINUED | OUTPATIENT
Start: 2019-10-04 | End: 2019-10-10

## 2019-10-04 RX ADMIN — Medication 300 MILLIGRAM(S): at 05:44

## 2019-10-04 RX ADMIN — AMLODIPINE BESYLATE 5 MILLIGRAM(S): 2.5 TABLET ORAL at 05:44

## 2019-10-04 RX ADMIN — Medication 145 MILLIGRAM(S): at 11:31

## 2019-10-04 RX ADMIN — Medication 2 PACKET(S): at 15:45

## 2019-10-04 RX ADMIN — Medication 0.1 MILLIGRAM(S): at 17:12

## 2019-10-04 RX ADMIN — Medication 300 MILLIGRAM(S): at 17:13

## 2019-10-04 RX ADMIN — Medication 0.1 MILLIGRAM(S): at 05:44

## 2019-10-04 NOTE — PROGRESS NOTE ADULT - SUBJECTIVE AND OBJECTIVE BOX
ALPHONSO MORRISSEY  44y Male    Medicine Team 2  Iona Delatorre MS4  Pager [phone]    INTERVAL HPI/OVERNIGHT EVENTS: Pt has no overnight events. He states that the discomfort in his lip has decreased to a 3/10 today. He says that he can open his mouth wider than he could yesterday. He denies any fever, chills, nausea, vomiting, diarrhea, or dysuria. He is eating and sleeping well. He has not had a bowel movement, which he attributes to being on a liquid diet.       REVIEW OF SYSTEMS:  CONSTITUTIONAL: no fever overnight, weight loss, or fatigue  EYES: No eye pain, visual disturbances, or discharge  ENMT:  No difficulty hearing, tinnitus, vertigo; No sinus or throat pain, +swollen lip described as a "discomfort" and "dull pressure" decreased in discomfort from yesterday  NECK: No pain or stiffness  RESPIRATORY: No cough or wheezing; No shortness of breath  CARDIOVASCULAR: No chest pain, palpitations, dizziness, or leg swelling  GASTROINTESTINAL: No abdominal or epigastric pain. No diarrhea.  GENITOURINARY: No dysuria, frequency, hematuria, or incontinence  NEUROLOGICAL: No headaches, memory loss, loss of strength, numbness, or tremors  SKIN: No itching, burning, rashes, or lesions   MUSCULOSKELETAL: No joint pain or swelling; No muscle, back, or extremity pain  ALLERGY AND IMMUNOLOGIC: No hives or eczema    FAMILY HISTORY:  Mother had history of swollen lip   Father had history of HTN     Vital Signs Last 24 Hrs  T(C): 36.6 (04 Oct 2019 05:34), Max: 38.1 (03 Oct 2019 08:15)  T(F): 97.9 (04 Oct 2019 05:34), Max: 100.5 (03 Oct 2019 08:15)  HR: 86 (04 Oct 2019 05:34) (86 - 114)  BP: 146/98 (04 Oct 2019 05:34) (134/90 - 156/92)  BP(mean): --  RR: 18 (04 Oct 2019 05:34) (18 - 18)  SpO2: 97% (04 Oct 2019 05:34) (96% - 98%)    PHYSICAL EXAM:  GENERAL: NAD, well-groomed, well-developed  HEAD:  Atraumatic, Normocephalic  EYES: EOMI, conjunctiva and sclera clear  ENMT: Moist mucous membranes, +missing upper and lower teeth on the sides, +swollen upper lip mainly on the left spreading slightly past midline that is dark in color and hard to the touch s/p I&D 10/3, incision on inside of mouth +black circular lesion in left corner of mouth between lips that was white in color on original presentation now smaller than yesterday  NECK: Supple, No JVD  NERVOUS SYSTEM:  Alert & Oriented X3, Good concentration; Motor Strength 5/5 B/L upper and lower extremities  CHEST/LUNG: Clear to percussion bilaterally; No rales, rhonchi, wheezing, or rubs  HEART: Regular rate and rhythm  ABDOMEN: Soft, Nontender, Nondistended; Bowel sounds present  EXTREMITIES:  2+ Peripheral Pulses, No clubbing, cyanosis, or edema  LYMPH: No lymphadenopathy noted  SKIN: No rashes or lesions    Consultant(s) Notes Reviewed:  [x ] YES  [ ] NO  Care Discussed with Consultants/Other Providers [ x] YES  [ ] NO      LABS:                        11.7   11.76 )-----------( 218      ( 03 Oct 2019 09:15 )             36.8     10-03    132<L>  |  97  |  16  ----------------------------<  92  3.8   |  23  |  1.10    Ca    9.2      03 Oct 2019 07:12  Phos  1.8     10-03  Mg     2.2     10-03        Urine Cultures 9/30 23:10: wnl    Blood Cultures 9/30 23:07:   - Growth in anaerobic bottle: gram + cocci in pairs  - Growth in aerobic bottle: gram + cocci in clusters  - Gram Stain: MRSA    Blood Cultures 10/1 01:01:  - Growth in anaerobic bottle: gram + cocci in clusters  - Growth in aerobic bottle: gram + cocci in clusters    -MRSA susceptible to vancomycin  - HSV not detected on lesion  - dsDNA, CCP, and anti nuclear factor titer wnl    Vanc Trough 10/3 13:25: 9.6    RADIOLOGY & ADDITIONAL TESTS:  EKG 9/30 20:57  Ventricular Rate 104 BPM, Atrial Rate 104 BPM, P-R Interval 170 ms, QRS Duration 78 ms, Q-T Interval 340 ms, QTC Calculation(Bezet) 447 ms, P Axis 38 degrees, R Axis 4 degrees, T Axis 21 degrees, Diagnosis Line SINUS TACHYCARDIA, MINIMAL VOLTAGE CRITERIA FOR LVH, MAY BE NORMAL VARIANT, BORDERLINE ECG    CT Maxillofacial w/ IV contrast 9/30 21:23  IMPRESSION:    Mild left facial soft tissue stranding, most pronounced the level of the left mandible and extending anterior to masseter muscle, concerning for cellulitis.   No discrete rim-enhancing collection identified to suggest abscess at this time. Dental hardware generates beam hardening artifact which obscures detailed evaluation.  Tiny punctate bubble of air adjacent to the left mandibular periosteum. Consider oral maxillofacial surgical consultation if there is concern for dental infection.    TTE 10/3 10:52:  Conclusions:  1. Normal mitral valve. Minimal mitral regurgitation.  2. Normal trileaflet aortic valve.  3. Normal left ventricular systolic function. No segmental wall motion abnormalities.  4. Normal right ventricular size and function.  ***Unable to exclude endocarditis on this study. Consider [facility] if clinically indicated.  *** No previous Echo exam.    Imaging Personally Reviewed:  [X] YES  [ ] NO      MEDICATIONS  (STANDING):  amLODIPine   Tablet 5 milliGRAM(s) Oral daily  cloNIDine 0.1 milliGRAM(s) Oral two times a day  fenofibrate Tablet 145 milliGRAM(s) Oral daily  influenza   Vaccine 0.5 milliLiter(s) IntraMuscular once  sodium chloride 0.9%. 1000 milliLiter(s) (100 mL/Hr) IV Continuous <Continuous>  vancomycin  IVPB      vancomycin  IVPB 1500 milliGRAM(s) IV Intermittent every 12 hours    MEDICATIONS  (PRN):  acetaminophen   Tablet .. 650 milliGRAM(s) Oral every 6 hours PRN Temp greater or equal to 38C (100.4F)  ibuprofen  Tablet. 600 milliGRAM(s) Oral every 6 hours PRN Moderate Pain (4 - 6)  traMADol 50 milliGRAM(s) Oral every 6 hours PRN Severe Pain (7 - 10) ALPHONSO MORRISSEY  44y Male    Medicine Team 2  Iona Delatorre MS4  Pager [phone]    INTERVAL HPI/OVERNIGHT EVENTS: Pt has no overnight events. He states that the discomfort in his lip has decreased to a 3/10 today. He says that he can open his mouth wider than he could yesterday. He denies any fever, chills, nausea, vomiting, diarrhea, or dysuria. He is eating and sleeping well. He has not had a bowel movement, which he attributes to being on a liquid diet.       REVIEW OF SYSTEMS:  CONSTITUTIONAL: no fever overnight, weight loss, or fatigue  EYES: No eye pain, visual disturbances, or discharge  ENMT:  No difficulty hearing, tinnitus, vertigo; No sinus or throat pain, +swollen lip described as a "discomfort" and "dull pressure" decreased in discomfort from yesterday  NECK: No pain or stiffness  RESPIRATORY: No cough or wheezing; No shortness of breath  CARDIOVASCULAR: No chest pain, palpitations, dizziness, or leg swelling  GASTROINTESTINAL: No abdominal or epigastric pain. No diarrhea.  GENITOURINARY: No dysuria, frequency, hematuria, or incontinence  NEUROLOGICAL: No headaches, memory loss, loss of strength, numbness, or tremors  SKIN: No itching, burning, rashes, or lesions   MUSCULOSKELETAL: No joint pain or swelling; No muscle, back, or extremity pain  ALLERGY AND IMMUNOLOGIC: No hives or eczema    FAMILY HISTORY:  Mother had history of swollen lip   Father had history of HTN     Vital Signs Last 24 Hrs  T(C): 36.6 (04 Oct 2019 05:34), Max: 38.1 (03 Oct 2019 08:15)  T(F): 97.9 (04 Oct 2019 05:34), Max: 100.5 (03 Oct 2019 08:15)  HR: 86 (04 Oct 2019 05:34) (86 - 114)  BP: 146/98 (04 Oct 2019 05:34) (134/90 - 156/92)  BP(mean): --  RR: 18 (04 Oct 2019 05:34) (18 - 18)  SpO2: 97% (04 Oct 2019 05:34) (96% - 98%)    PHYSICAL EXAM:  GENERAL: NAD, well-groomed, well-developed  HEAD:  Atraumatic, Normocephalic  EYES: EOMI, conjunctiva and sclera clear  ENMT: Moist mucous membranes, +missing upper and lower teeth on the sides, +swollen upper lip mainly on the left spreading slightly past midline that is dark in color and hard to the touch s/p I&D 10/3, incision on inside of mouth +black circular lesion in left corner of mouth between lips that was white in color on original presentation now smaller than yesterday  NECK: Supple, No JVD  NERVOUS SYSTEM:  Alert & Oriented X3, Good concentration; Motor Strength 5/5 B/L upper and lower extremities  CHEST/LUNG: Clear to percussion bilaterally; No rales, rhonchi, wheezing, or rubs  HEART: Regular rate and rhythm  ABDOMEN: Soft, Nontender, Nondistended; Bowel sounds present  EXTREMITIES:  2+ Peripheral Pulses, No clubbing, cyanosis, or edema  LYMPH: No lymphadenopathy noted  SKIN: No rashes or lesions    Consultant(s) Notes Reviewed:  [x ] YES  [ ] NO  Care Discussed with Consultants/Other Providers [ x] YES  [ ] NO      LABS:                        11.7   11.76 )-----------( 218      ( 03 Oct 2019 09:15 )             36.8     10-04    134<L>  |  97  |  13  ----------------------------<  96  3.8   |  25  |  1.06    Ca    8.8      04 Oct 2019 07:28  Phos  2.2     10-04  Mg     2.2     10-04      Urine Cultures 9/30 23:10: wnl    Blood Cultures 9/30 23:07:   - Growth in anaerobic bottle: gram + cocci in pairs  - Growth in aerobic bottle: gram + cocci in clusters  - Gram Stain: MRSA    Blood Cultures 10/1 01:01:  - Growth in anaerobic bottle: gram + cocci in clusters  - Growth in aerobic bottle: gram + cocci in clusters    -MRSA susceptible to vancomycin  - HSV not detected on lesion  - dsDNA, CCP, and anti nuclear factor titer wnl    Vanc Trough 10/3 13:25: 9.6    RADIOLOGY & ADDITIONAL TESTS:  EKG 9/30 20:57  Ventricular Rate 104 BPM, Atrial Rate 104 BPM, P-R Interval 170 ms, QRS Duration 78 ms, Q-T Interval 340 ms, QTC Calculation(Bezet) 447 ms, P Axis 38 degrees, R Axis 4 degrees, T Axis 21 degrees, Diagnosis Line SINUS TACHYCARDIA, MINIMAL VOLTAGE CRITERIA FOR LVH, MAY BE NORMAL VARIANT, BORDERLINE ECG    CT Maxillofacial w/ IV contrast 9/30 21:23  IMPRESSION:    Mild left facial soft tissue stranding, most pronounced the level of the left mandible and extending anterior to masseter muscle, concerning for cellulitis.   No discrete rim-enhancing collection identified to suggest abscess at this time. Dental hardware generates beam hardening artifact which obscures detailed evaluation.  Tiny punctate bubble of air adjacent to the left mandibular periosteum. Consider oral maxillofacial surgical consultation if there is concern for dental infection.    TTE 10/3 10:52:  Conclusions:  1. Normal mitral valve. Minimal mitral regurgitation.  2. Normal trileaflet aortic valve.  3. Normal left ventricular systolic function. No segmental wall motion abnormalities.  4. Normal right ventricular size and function.  ***Unable to exclude endocarditis on this study. Consider [facility] if clinically indicated.  *** No previous Echo exam.    Imaging Personally Reviewed:  [X] YES  [ ] NO      MEDICATIONS  (STANDING):  amLODIPine   Tablet 5 milliGRAM(s) Oral daily  cloNIDine 0.1 milliGRAM(s) Oral two times a day  fenofibrate Tablet 145 milliGRAM(s) Oral daily  influenza   Vaccine 0.5 milliLiter(s) IntraMuscular once  sodium chloride 0.9%. 1000 milliLiter(s) (100 mL/Hr) IV Continuous <Continuous>  vancomycin  IVPB      vancomycin  IVPB 1500 milliGRAM(s) IV Intermittent every 12 hours    MEDICATIONS  (PRN):  acetaminophen   Tablet .. 650 milliGRAM(s) Oral every 6 hours PRN Temp greater or equal to 38C (100.4F)  ibuprofen  Tablet. 600 milliGRAM(s) Oral every 6 hours PRN Moderate Pain (4 - 6)  traMADol 50 milliGRAM(s) Oral every 6 hours PRN Severe Pain (7 - 10) ALPHONSO MORRISSEY  44y Male    Medicine Team 2  Iona Delatorre MS4  Pager [phone]    INTERVAL HPI/OVERNIGHT EVENTS: Pt has no overnight events. He states that the discomfort in his lip has decreased to a 3/10 today. He says that he can open his mouth wider than he could yesterday. He denies any fever, chills, nausea, vomiting, diarrhea, or dysuria. He is eating and sleeping well. He has not had a bowel movement, which he attributes to being on a liquid diet.       REVIEW OF SYSTEMS:  CONSTITUTIONAL: no fever overnight, weight loss, or fatigue  EYES: No eye pain, visual disturbances, or discharge  ENMT:  No difficulty hearing, tinnitus, vertigo; No sinus or throat pain, +swollen lip described as a "discomfort" and "dull pressure" decreased in discomfort from yesterday  NECK: No pain or stiffness  RESPIRATORY: No cough or wheezing; No shortness of breath  CARDIOVASCULAR: No chest pain, palpitations, dizziness, or leg swelling  GASTROINTESTINAL: No abdominal or epigastric pain. No diarrhea.  GENITOURINARY: No dysuria, frequency, hematuria, or incontinence  NEUROLOGICAL: No headaches, memory loss, loss of strength, numbness, or tremors  SKIN: No itching, burning, rashes, or lesions   MUSCULOSKELETAL: No joint pain or swelling; No muscle, back, or extremity pain  ALLERGY AND IMMUNOLOGIC: No hives or eczema    FAMILY HISTORY:  Mother had history of swollen lip   Father had history of HTN     Vital Signs Last 24 Hrs  T(C): 36.6 (04 Oct 2019 05:34), Max: 38.1 (03 Oct 2019 08:15)  T(F): 97.9 (04 Oct 2019 05:34), Max: 100.5 (03 Oct 2019 08:15)  HR: 86 (04 Oct 2019 05:34) (86 - 114)  BP: 146/98 (04 Oct 2019 05:34) (134/90 - 156/92)  BP(mean): --  RR: 18 (04 Oct 2019 05:34) (18 - 18)  SpO2: 97% (04 Oct 2019 05:34) (96% - 98%)    PHYSICAL EXAM:  GENERAL: NAD, well-groomed, well-developed  HEAD:  Atraumatic, Normocephalic  EYES: EOMI, conjunctiva and sclera clear  ENMT: Moist mucous membranes, +missing upper and lower teeth on the sides, +swollen upper lip mainly on the left spreading slightly past midline that is dark in color and hard to the touch s/p I&D 10/3, incision on inside of mouth +black circular lesion in left corner of mouth between lips that was white in color on original presentation now smaller than yesterday  NECK: Supple, No JVD  NERVOUS SYSTEM:  Alert & Oriented X3, Good concentration; Motor Strength 5/5 B/L upper and lower extremities  CHEST/LUNG: Clear to percussion bilaterally; No rales, rhonchi, wheezing, or rubs  HEART: Regular rate and rhythm  ABDOMEN: Soft, Nontender, Nondistended; Bowel sounds present  EXTREMITIES:  2+ Peripheral Pulses, No clubbing, cyanosis, or edema  LYMPH: No lymphadenopathy noted  SKIN: No rashes or lesions    Consultant(s) Notes Reviewed:  [x ] YES  [ ] NO  Care Discussed with Consultants/Other Providers [ x] YES  [ ] NO      LABS:                        11.1   10.95 )-----------( 230      ( 04 Oct 2019 09:49 )             35.0     10-04    134<L>  |  97  |  13  ----------------------------<  96  3.8   |  25  |  1.06    Ca    8.8      04 Oct 2019 07:28  Phos  2.2     10-04  Mg     2.2     10-04        Urine Cultures 9/30 23:10: wnl    Blood Cultures 9/30 23:07:   - Growth in anaerobic bottle: gram + cocci in pairs  - Growth in aerobic bottle: gram + cocci in clusters  - Gram Stain: MRSA    Blood Cultures 10/1 01:01:  - Growth in anaerobic bottle: gram + cocci in clusters  - Growth in aerobic bottle: gram + cocci in clusters    -MRSA susceptible to vancomycin  - HSV not detected on lesion  - dsDNA, CCP, and anti nuclear factor titer wnl    Vanc Trough 10/3 13:25: 9.6    RADIOLOGY & ADDITIONAL TESTS:  EKG 9/30 20:57  Ventricular Rate 104 BPM, Atrial Rate 104 BPM, P-R Interval 170 ms, QRS Duration 78 ms, Q-T Interval 340 ms, QTC Calculation(Bezet) 447 ms, P Axis 38 degrees, R Axis 4 degrees, T Axis 21 degrees, Diagnosis Line SINUS TACHYCARDIA, MINIMAL VOLTAGE CRITERIA FOR LVH, MAY BE NORMAL VARIANT, BORDERLINE ECG    CT Maxillofacial w/ IV contrast 9/30 21:23  IMPRESSION:    Mild left facial soft tissue stranding, most pronounced the level of the left mandible and extending anterior to masseter muscle, concerning for cellulitis.   No discrete rim-enhancing collection identified to suggest abscess at this time. Dental hardware generates beam hardening artifact which obscures detailed evaluation.  Tiny punctate bubble of air adjacent to the left mandibular periosteum. Consider oral maxillofacial surgical consultation if there is concern for dental infection.    TTE 10/3 10:52:  Conclusions:  1. Normal mitral valve. Minimal mitral regurgitation.  2. Normal trileaflet aortic valve.  3. Normal left ventricular systolic function. No segmental wall motion abnormalities.  4. Normal right ventricular size and function.  ***Unable to exclude endocarditis on this study. Consider [facility] if clinically indicated.  *** No previous Echo exam.    Imaging Personally Reviewed:  [X] YES  [ ] NO      MEDICATIONS  (STANDING):  amLODIPine   Tablet 5 milliGRAM(s) Oral daily  cloNIDine 0.1 milliGRAM(s) Oral two times a day  fenofibrate Tablet 145 milliGRAM(s) Oral daily  influenza   Vaccine 0.5 milliLiter(s) IntraMuscular once  sodium chloride 0.9%. 1000 milliLiter(s) (100 mL/Hr) IV Continuous <Continuous>  vancomycin  IVPB      vancomycin  IVPB 1500 milliGRAM(s) IV Intermittent every 12 hours    MEDICATIONS  (PRN):  acetaminophen   Tablet .. 650 milliGRAM(s) Oral every 6 hours PRN Temp greater or equal to 38C (100.4F)  ibuprofen  Tablet. 600 milliGRAM(s) Oral every 6 hours PRN Moderate Pain (4 - 6)  traMADol 50 milliGRAM(s) Oral every 6 hours PRN Severe Pain (7 - 10) ALPHONSO MORRISSEY  44y Male    Medicine Team 2  Iona Delatorre MS4  Pager [phone]    INTERVAL HPI/OVERNIGHT EVENTS: Pt has no overnight events. He states that the discomfort in his lip has decreased to a 3/10 today. He says that he can open his mouth wider than he could yesterday. He denies any fever, chills, nausea, vomiting, diarrhea, or dysuria. He is eating and sleeping well. He has not had a bowel movement, which he attributes to being on a liquid diet.       REVIEW OF SYSTEMS:  CONSTITUTIONAL: no fever overnight, weight loss, or fatigue  EYES: No eye pain, visual disturbances, or discharge  ENMT:  No difficulty hearing, tinnitus, vertigo; No sinus or throat pain, +swollen lip described as a "discomfort" and "dull pressure" decreased in discomfort from yesterday  NECK: No pain or stiffness  RESPIRATORY: No cough or wheezing; No shortness of breath  CARDIOVASCULAR: No chest pain, palpitations, dizziness, or leg swelling  GASTROINTESTINAL: No abdominal or epigastric pain. No diarrhea.  GENITOURINARY: No dysuria, frequency, hematuria, or incontinence  NEUROLOGICAL: No headaches, memory loss, loss of strength, numbness, or tremors  SKIN: No itching, burning, rashes, or lesions   MUSCULOSKELETAL: No joint pain or swelling; No muscle, back, or extremity pain  ALLERGY AND IMMUNOLOGIC: No hives or eczema    FAMILY HISTORY:  Mother had history of swollen lip   Father had history of HTN     Vital Signs Last 24 Hrs  T(C): 36.6 (04 Oct 2019 05:34), Max: 38.1 (03 Oct 2019 08:15)  T(F): 97.9 (04 Oct 2019 05:34), Max: 100.5 (03 Oct 2019 08:15)  HR: 86 (04 Oct 2019 05:34) (86 - 114)  BP: 146/98 (04 Oct 2019 05:34) (134/90 - 156/92)  BP(mean): --  RR: 18 (04 Oct 2019 05:34) (18 - 18)  SpO2: 97% (04 Oct 2019 05:34) (96% - 98%)    PHYSICAL EXAM:  GENERAL: NAD, well-groomed, well-developed  HEAD:  Atraumatic, Normocephalic  EYES: EOMI, conjunctiva and sclera clear  ENMT: Moist mucous membranes, +missing upper and lower teeth on the sides, +swollen upper lip mainly on the left spreading slightly past midline that is dark in color and hard to the touch s/p I&D 10/3, incision on inside of mouth +black circular lesion in left corner of mouth between lips that was white in color on original presentation now smaller than yesterday  NECK: Supple, No JVD  NERVOUS SYSTEM:  Alert & Oriented X3, Good concentration; Motor Strength 5/5 B/L upper and lower extremities  CHEST/LUNG: Clear to percussion bilaterally; No rales, rhonchi, wheezing, or rubs  HEART: Regular rate and rhythm  ABDOMEN: Soft, Nontender, Nondistended; Bowel sounds present  EXTREMITIES:  2+ Peripheral Pulses, No clubbing, cyanosis, or edema  LYMPH: No lymphadenopathy noted  SKIN: No rashes or lesions    Consultant(s) Notes Reviewed:  [x ] YES  [ ] NO  Care Discussed with Consultants/Other Providers [ x] YES  [ ] NO      LABS:                        11.1   10.95 )-----------( 230      ( 04 Oct 2019 09:49 )             35.0     10-04    134<L>  |  97  |  13  ----------------------------<  96  3.8   |  25  |  1.06    Ca    8.8      04 Oct 2019 07:28  Phos  2.2     10-04  Mg     2.2     10-04 9/30 23:10 Urine Cultures: wnl    9/30 23:07 Blood Cultures:   - Growth in anaerobic bottle: gram + cocci in pairs  - Growth in aerobic bottle: gram + cocci in clusters  - Gram Stain: MRSA    10/1 01:01 Blood Cultures:  - Growth in anaerobic bottle: gram + cocci in clusters  - Growth in aerobic bottle: gram + cocci in clusters    -MRSA susceptible to vancomycin  - HSV not detected on lesion  - dsDNA, CCP, and anti nuclear factor titer wnl    10/3 13:25 Vanc Trough: 9.6    10/3 10:12 Blood Cultures: No growth to date    RADIOLOGY & ADDITIONAL TESTS:  EKG 9/30 20:57  Ventricular Rate 104 BPM, Atrial Rate 104 BPM, P-R Interval 170 ms, QRS Duration 78 ms, Q-T Interval 340 ms, QTC Calculation(Bezet) 447 ms, P Axis 38 degrees, R Axis 4 degrees, T Axis 21 degrees, Diagnosis Line SINUS TACHYCARDIA, MINIMAL VOLTAGE CRITERIA FOR LVH, MAY BE NORMAL VARIANT, BORDERLINE ECG    CT Maxillofacial w/ IV contrast 9/30 21:23  IMPRESSION:    Mild left facial soft tissue stranding, most pronounced the level of the left mandible and extending anterior to masseter muscle, concerning for cellulitis.   No discrete rim-enhancing collection identified to suggest abscess at this time. Dental hardware generates beam hardening artifact which obscures detailed evaluation.  Tiny punctate bubble of air adjacent to the left mandibular periosteum. Consider oral maxillofacial surgical consultation if there is concern for dental infection.    TTE 10/3 10:52:  Conclusions:  1. Normal mitral valve. Minimal mitral regurgitation.  2. Normal trileaflet aortic valve.  3. Normal left ventricular systolic function. No segmental wall motion abnormalities.  4. Normal right ventricular size and function.  ***Unable to exclude endocarditis on this study. Consider [facility] if clinically indicated.  *** No previous Echo exam.    Imaging Personally Reviewed:  [X] YES  [ ] NO      MEDICATIONS  (STANDING):  amLODIPine   Tablet 5 milliGRAM(s) Oral daily  cloNIDine 0.1 milliGRAM(s) Oral two times a day  fenofibrate Tablet 145 milliGRAM(s) Oral daily  influenza   Vaccine 0.5 milliLiter(s) IntraMuscular once  sodium chloride 0.9%. 1000 milliLiter(s) (100 mL/Hr) IV Continuous <Continuous>  vancomycin  IVPB      vancomycin  IVPB 1500 milliGRAM(s) IV Intermittent every 12 hours    MEDICATIONS  (PRN):  acetaminophen   Tablet .. 650 milliGRAM(s) Oral every 6 hours PRN Temp greater or equal to 38C (100.4F)  ibuprofen  Tablet. 600 milliGRAM(s) Oral every 6 hours PRN Moderate Pain (4 - 6)  traMADol 50 milliGRAM(s) Oral every 6 hours PRN Severe Pain (7 - 10)

## 2019-10-04 NOTE — PROGRESS NOTE ADULT - PROBLEM SELECTOR PLAN 1
Met sepsis criteria with leukocytosis, fevers and source of  non-purulent cellulitis of the upper lip, likely due to superinfection from mouth sore. Low suspicion for etiology of symptoms due to angioedema 2/2 ACE inhibitor use.   Currently protecting airway, low concern for compromise  - Per dental, no interventions at time of presentation  - s/p Vancomycin in ED  - Vancomycin 1.5g IV Q12  - acetaminophen for temp greater or equal to 38C Met sepsis criteria with leukocytosis, fevers and source of  non-purulent cellulitis of the upper lip, likely due to superinfection from mouth sore, now s/p I&D, sepsis resolving.    - Vancomycin 1.5g IV Q12  - acetaminophen for temp greater or equal to 38C  - will obtain vanc trough prior to PM done on 10/5 with goal vanc trough 15-20  - will need picc for mrsa bacteremia for 4 weeks abx.

## 2019-10-04 NOTE — PROGRESS NOTE ADULT - PROBLEM SELECTOR PLAN 4
Pt with SBP 140s currently, lisinopril dc'ed.  - Clonidine 0.1 mg PO BID up from qd  - c/w Amlodipine 5mg PO daily, can increase to amlodipine 10 mg today  - d/c lisinopril 20mg po daily due to potential reaction to ACE inhibitor resulting in angioedema Pt with SBP 140s currently, lisinopril dc'ed.  - Clonidine 0.1 mg PO BID up from qd  - c/w Amlodipine 5mg PO daily  - d/c lisinopril 20mg po daily due to potential reaction to ACE inhibitor resulting in angioedema DVT: Improve score 0, ambulate   Diet: full liquid diet  Dispo: Home pending improvement and resolution of bacteremia

## 2019-10-04 NOTE — PROGRESS NOTE ADULT - PROBLEM SELECTOR PLAN 2
CT oral maxillofacial with Mild left facial soft tissue swelling concerning for cellulitis with no evidence of abscess on 9/30  - HSV swab of lesion negative  - unclear if oral mucosal lesions are aphthous ulcer or 2/2 autoimmune disease (i.e SLE, Behcet's), although no other systemic signs/symptoms present; Nevertheless, will check   - SOUTH, dsDNA, CCP, HIV and Lyme negative  - RF elevated to 17  - Vancomycin 1.5g IV Q12  - ibuprofen and tramadol PRN for pain  - s/p plastic surgery I&D 10/3 w/ outpatient plastics f/u , culture with gram stain from I&D pending, per plastics, gradually advance from full liquid diet to regular diet starting 10/5 - Bacterial cultures returned + for MRSA bacteremia sensitive to vancomycin, BCx from 10/3 is NGTD  - Vancomycin 1.5g IV Q12  - per ID, hold off on GARLAND for now, check blood cultures over weekend to ensure they remain negative, will ultimately need PICC line for 4 weeks abx.

## 2019-10-04 NOTE — PROGRESS NOTE ADULT - ASSESSMENT
45 yo M with PMH of asthma and essential HTN presenting with sepsis 2/2 cellulitis of the upper lip and was found to have MRSA bacteremia, likely from superinfection of lip. 45 yo M with PMH of asthma and essential HTN presenting with sepsis 2/2 cellulitis of the upper lip and was found to have MRSA bacteremia, likely from lip cellulitis/abscess. 45 yo M with PMH of asthma and essential HTN presenting with sepsis 2/2 cellulitis of the upper lip and was found to have MRSA bacteremia, likely from lip abcess with surrounding cellulitis, now s/p I&D.

## 2019-10-04 NOTE — PROGRESS NOTE ADULT - SUBJECTIVE AND OBJECTIVE BOX
ALPHONSO MORRISSEY 44y [id number]    Patient is a 44y old  Male who presents with a chief complaint of Cellulitis (04 Oct 2019 08:01)      Follow Up/CC:  ID following for mrsa    Interval History/ROS: feeling better, fever curve better    Allergies    No Known Allergies    Intolerances        ANTIMICROBIALS:  vancomycin  IVPB    vancomycin  IVPB 1500 every 12 hours      MEDICATIONS  (STANDING):  amLODIPine   Tablet 5 milliGRAM(s) Oral daily  cloNIDine 0.1 milliGRAM(s) Oral two times a day  fenofibrate Tablet 145 milliGRAM(s) Oral daily  influenza   Vaccine 0.5 milliLiter(s) IntraMuscular once  potassium phosphate / sodium phosphate powder 2 Packet(s) Oral once  sodium chloride 0.9%. 1000 milliLiter(s) (100 mL/Hr) IV Continuous <Continuous>  vancomycin  IVPB      vancomycin  IVPB 1500 milliGRAM(s) IV Intermittent every 12 hours    MEDICATIONS  (PRN):  acetaminophen   Tablet .. 650 milliGRAM(s) Oral every 6 hours PRN Temp greater or equal to 38C (100.4F)  ibuprofen  Tablet. 600 milliGRAM(s) Oral every 6 hours PRN Moderate Pain (4 - 6)  traMADol 50 milliGRAM(s) Oral every 6 hours PRN Severe Pain (7 - 10)        Vital Signs Last 24 Hrs  T(C): 36.7 (04 Oct 2019 08:31), Max: 37.9 (03 Oct 2019 19:56)  T(F): 98.1 (04 Oct 2019 08:31), Max: 100.3 (03 Oct 2019 19:56)  HR: 94 (04 Oct 2019 08:31) (86 - 114)  BP: 134/96 (04 Oct 2019 08:31) (134/90 - 156/92)  BP(mean): --  RR: 16 (04 Oct 2019 08:31) (16 - 18)  SpO2: 98% (04 Oct 2019 08:31) (96% - 98%)    CBC Full  -  ( 04 Oct 2019 09:49 )  WBC Count : 10.95 K/uL  RBC Count : 5.84 M/uL  Hemoglobin : 11.1 g/dL  Hematocrit : 35.0 %  Platelet Count - Automated : 230 K/uL  Mean Cell Volume : 59.9 fl  Mean Cell Hemoglobin : 19.0 pg  Mean Cell Hemoglobin Concentration : 31.7 gm/dL  Auto Neutrophil # : x  Auto Lymphocyte # : x  Auto Monocyte # : x  Auto Eosinophil # : x  Auto Basophil # : x  Auto Neutrophil % : x  Auto Lymphocyte % : x  Auto Monocyte % : x  Auto Eosinophil % : x  Auto Basophil % : x    10-04    134<L>  |  97  |  13  ----------------------------<  96  3.8   |  25  |  1.06    Ca    8.8      04 Oct 2019 07:28  Phos  2.2     10-04  Mg     2.2     10-04            MICROBIOLOGY:  .Blood  10-03-19   No growth to date.  --  --      .Blood  10-01-19   Growth in aerobic and anaerobic bottles: Methicillin resistant  Staphylococcus aureus  See previous culture [id number]  --    Growth in aerobic bottle: Gram Positive Cocci in Clusters  Growth in anaerobic bottle: Gram Positive Cocci in Clusters      .Urine  09-30-19   <10,000 CFU/mL Normal Urogenital [place]  --  --      .Blood  09-30-19   Growth in aerobic and anaerobic bottles: Methicillin resistant  Staphylococcus aureus  "Due to technical problems, Proteus sp. will Not be reported as part of  the BCID panel until further notice"  ***Blood Panel PCR results on this specimen are available  approximately 3 hours after the Gram stain result.***  Gram stain, PCR, and/or culture results may not always  correspond due to difference in methodologies.  ************************************************************  This PCR assay was performed using [employer].  The following targets are tested for: Enterococcus,  vancomycin resistant enterococci, Listeria monocytogenes,  coagulase negative staphylococci, S. aureus,  methicillin resistant S. aureus, Streptococcus agalactiae  (Group B), S. pneumoniae, S. pyogenes (Group A),  Acinetobacter baumannii, Enterobacter cloacae, E. coli,  Klebsiella oxytoca, K. pneumoniae, Proteus sp.,  Serratia marcescens, Haemophilus influenzae,  Neisseria meningitidis, Pseudomonas aeruginosa, Candida  albicans, C. glabrata, C krusei, C parapsilosis,  C. tropicalis and the KPC resistance gene.  --  Blood Culture PCR  Methicillin resistant Staphylococcus aureus      Vancomycin Level, Trough: 9.6 ug/mL (10-03-19 @ 13:25)    RADIOLOGY    < from: CT Maxillofacial w/ IV Cont (09.30.19 @ 21:23) >  Mild left facial soft tissue stranding, most pronounced the level of the   left mandible and extending anterior to masseter muscle, concerning for   cellulitis.     No discrete rim-enhancing collection identified to suggest abscess at   this time. Dental hardware generates beam hardening artifact which   obscures detailed evaluation.  Tiny punctate bubble of air adjacent to   the left mandibular periosteum. Consider oral maxillofacial surgical   consultation if there is concern for dental infection.    < end of copied text >

## 2019-10-04 NOTE — PROGRESS NOTE ADULT - PROBLEM SELECTOR PLAN 3
- Bacterial cultures returned + for MRSA bacteremia sensitive to vancomycin  - Vancomycin 1.5g IV Q12 - Bacterial cultures returned + for MRSA bacteremia sensitive to vancomycin  - Vancomycin 1.5g IV Q12  - per ID, hold off on GARLAND for now, check blood cultures over weekend to ensure they remain negative, will ultimately need PICC line - Bacterial cultures returned + for MRSA bacteremia sensitive to vancomycin  - Vancomycin 1.5g IV Q12  - per ID, hold off on GARLAND for now, check blood cultures over weekend to ensure they remain negative, will ultimately need PICC line  Repeat blood cultures from blood cultures 10/3 prelim negative Pt with SBP 140s currently, lisinopril dc'ed at admission for concern for angioedema.  - Clonidine 0.1 mg PO BID up from qd  - will increase to amlodipine 10 mg QD today

## 2019-10-05 LAB
-  AMPICILLIN/SULBACTAM: SIGNIFICANT CHANGE UP
-  CEFAZOLIN: SIGNIFICANT CHANGE UP
-  CLINDAMYCIN: SIGNIFICANT CHANGE UP
-  DAPTOMYCIN: SIGNIFICANT CHANGE UP
-  ERYTHROMYCIN: SIGNIFICANT CHANGE UP
-  GENTAMICIN: SIGNIFICANT CHANGE UP
-  LINEZOLID: SIGNIFICANT CHANGE UP
-  OXACILLIN: SIGNIFICANT CHANGE UP
-  PENICILLIN: SIGNIFICANT CHANGE UP
-  RIFAMPIN: SIGNIFICANT CHANGE UP
-  TETRACYCLINE: SIGNIFICANT CHANGE UP
-  TRIMETHOPRIM/SULFAMETHOXAZOLE: SIGNIFICANT CHANGE UP
-  VANCOMYCIN: SIGNIFICANT CHANGE UP
ANION GAP SERPL CALC-SCNC: 10 MMOL/L — SIGNIFICANT CHANGE UP (ref 5–17)
BASOPHILS # BLD AUTO: 0.06 K/UL — SIGNIFICANT CHANGE UP (ref 0–0.2)
BASOPHILS NFR BLD AUTO: 0.5 % — SIGNIFICANT CHANGE UP (ref 0–2)
BUN SERPL-MCNC: 14 MG/DL — SIGNIFICANT CHANGE UP (ref 7–23)
CALCIUM SERPL-MCNC: 9.4 MG/DL — SIGNIFICANT CHANGE UP (ref 8.4–10.5)
CHLORIDE SERPL-SCNC: 95 MMOL/L — LOW (ref 96–108)
CO2 SERPL-SCNC: 26 MMOL/L — SIGNIFICANT CHANGE UP (ref 22–31)
CREAT SERPL-MCNC: 1.1 MG/DL — SIGNIFICANT CHANGE UP (ref 0.5–1.3)
CULTURE RESULTS: SIGNIFICANT CHANGE UP
EOSINOPHIL # BLD AUTO: 0.4 K/UL — SIGNIFICANT CHANGE UP (ref 0–0.5)
EOSINOPHIL NFR BLD AUTO: 3.6 % — SIGNIFICANT CHANGE UP (ref 0–6)
GLUCOSE SERPL-MCNC: 97 MG/DL — SIGNIFICANT CHANGE UP (ref 70–99)
HCT VFR BLD CALC: 36 % — LOW (ref 39–50)
HGB BLD-MCNC: 11.3 G/DL — LOW (ref 13–17)
IMM GRANULOCYTES NFR BLD AUTO: 1.6 % — HIGH (ref 0–1.5)
LYMPHOCYTES # BLD AUTO: 2.49 K/UL — SIGNIFICANT CHANGE UP (ref 1–3.3)
LYMPHOCYTES # BLD AUTO: 22.1 % — SIGNIFICANT CHANGE UP (ref 13–44)
MAGNESIUM SERPL-MCNC: 2.4 MG/DL — SIGNIFICANT CHANGE UP (ref 1.6–2.6)
MCHC RBC-ENTMCNC: 19 PG — LOW (ref 27–34)
MCHC RBC-ENTMCNC: 31.4 GM/DL — LOW (ref 32–36)
MCV RBC AUTO: 60.6 FL — LOW (ref 80–100)
METHOD TYPE: SIGNIFICANT CHANGE UP
MONOCYTES # BLD AUTO: 1.12 K/UL — HIGH (ref 0–0.9)
MONOCYTES NFR BLD AUTO: 10 % — SIGNIFICANT CHANGE UP (ref 2–14)
NEUTROPHILS # BLD AUTO: 7 K/UL — SIGNIFICANT CHANGE UP (ref 1.8–7.4)
NEUTROPHILS NFR BLD AUTO: 62.2 % — SIGNIFICANT CHANGE UP (ref 43–77)
ORGANISM # SPEC MICROSCOPIC CNT: SIGNIFICANT CHANGE UP
ORGANISM # SPEC MICROSCOPIC CNT: SIGNIFICANT CHANGE UP
PHOSPHATE SERPL-MCNC: 2.7 MG/DL — SIGNIFICANT CHANGE UP (ref 2.5–4.5)
PLATELET # BLD AUTO: 290 K/UL — SIGNIFICANT CHANGE UP (ref 150–400)
POTASSIUM SERPL-MCNC: 4.2 MMOL/L — SIGNIFICANT CHANGE UP (ref 3.5–5.3)
POTASSIUM SERPL-SCNC: 4.2 MMOL/L — SIGNIFICANT CHANGE UP (ref 3.5–5.3)
RBC # BLD: 5.94 M/UL — HIGH (ref 4.2–5.8)
RBC # FLD: 14.9 % — HIGH (ref 10.3–14.5)
SODIUM SERPL-SCNC: 131 MMOL/L — LOW (ref 135–145)
SPECIMEN SOURCE: SIGNIFICANT CHANGE UP
VANCOMYCIN TROUGH SERPL-MCNC: 12 UG/ML — SIGNIFICANT CHANGE UP (ref 10–20)
WBC # BLD: 11.25 K/UL — HIGH (ref 3.8–10.5)
WBC # FLD AUTO: 11.25 K/UL — HIGH (ref 3.8–10.5)

## 2019-10-05 PROCEDURE — 99233 SBSQ HOSP IP/OBS HIGH 50: CPT | Mod: GC

## 2019-10-05 RX ORDER — VANCOMYCIN HCL 1 G
1750 VIAL (EA) INTRAVENOUS EVERY 12 HOURS
Refills: 0 | Status: DISCONTINUED | OUTPATIENT
Start: 2019-10-05 | End: 2019-10-07

## 2019-10-05 RX ADMIN — AMLODIPINE BESYLATE 10 MILLIGRAM(S): 2.5 TABLET ORAL at 05:45

## 2019-10-05 RX ADMIN — Medication 145 MILLIGRAM(S): at 12:50

## 2019-10-05 RX ADMIN — Medication 0.1 MILLIGRAM(S): at 05:45

## 2019-10-05 RX ADMIN — Medication 300 MILLIGRAM(S): at 05:45

## 2019-10-05 RX ADMIN — Medication 0.1 MILLIGRAM(S): at 17:01

## 2019-10-05 RX ADMIN — Medication 250 MILLIGRAM(S): at 17:01

## 2019-10-05 NOTE — PROGRESS NOTE ADULT - ASSESSMENT
43 yo M with PMH of asthma and essential HTN presenting with sepsis 2/2 cellulitis of the upper lip and was found to have MRSA bacteremia, likely from lip abcess with surrounding cellulitis, now s/p I&D.

## 2019-10-05 NOTE — PROGRESS NOTE ADULT - SUBJECTIVE AND OBJECTIVE BOX
ALPHONSO MORRISSEY  44y Male    Medicine Team 2  Iona Delatorre MS4  Pager     INTERVAL HPI/OVERNIGHT EVENTS: Pt has no overnight events. He states that the discomfort in his lip has decreased today. He denies any fever, chills, nausea, vomiting, diarrhea, or dysuria. He is eating and sleeping well. He has not had a bowel movement, which he attributes to being on a liquid diet. He repots tolerating a regular diet for breakfast today. Pt states he will try warm compresses on his lip to see if they help with the discomfort and swelling.       REVIEW OF SYSTEMS:  CONSTITUTIONAL: no fever overnight, weight loss, or fatigue  EYES: No eye pain, visual disturbances, or discharge  ENMT:  No difficulty hearing, tinnitus, vertigo; No sinus or throat pain, +swollen lip described as a "discomfort" and "pressure" decreased in discomfort from yesterday  NECK: No pain or stiffness  RESPIRATORY: No cough or wheezing; No shortness of breath  CARDIOVASCULAR: No chest pain, palpitations, dizziness, or leg swelling  GASTROINTESTINAL: No abdominal or epigastric pain. No diarrhea.  GENITOURINARY: No dysuria, frequency, hematuria, or incontinence  NEUROLOGICAL: No headaches, memory loss, loss of strength, numbness, or tremors  SKIN: No itching, burning, rashes, or lesions   MUSCULOSKELETAL: No joint pain or swelling; No muscle, back, or extremity pain  ALLERGY AND IMMUNOLOGIC: No hives or eczema    FAMILY HISTORY:  Mother had history of swollen lip   Father had history of HTN     Vital Signs Last 24 Hrs  T(C): 37.1 (05 Oct 2019 09:10), Max: 37.1 (05 Oct 2019 09:10)  T(F): 98.8 (05 Oct 2019 09:10), Max: 98.8 (05 Oct 2019 09:10)  HR: 83 (05 Oct 2019 09:10) (74 - 91)  BP: 124/85 (05 Oct 2019 09:10) (124/85 - 156/97)  BP(mean): --  RR: 18 (05 Oct 2019 09:10) (16 - 18)  SpO2: 97% (05 Oct 2019 09:10) (97% - 99%)    PHYSICAL EXAM:  GENERAL: NAD, well-groomed, well-developed  HEAD:  Atraumatic, Normocephalic  EYES: EOMI, conjunctiva and sclera clear  ENMT: Moist mucous membranes, +missing upper and lower teeth on the sides, +swollen upper lip mainly on the left spreading past midline that is dark in color with some dried blood and softer to the touch than previous examinations  NECK: Supple, No JVD  NERVOUS SYSTEM:  Alert & Oriented X3, Good concentration; Motor Strength 5/5 B/L upper and lower extremities  CHEST/LUNG: Clear to percussion bilaterally; No rales, rhonchi, wheezing, or rubs  HEART: Regular rate and rhythm  ABDOMEN: Soft, Nontender, Nondistended; Bowel sounds present  EXTREMITIES:  2+ Peripheral Pulses, No clubbing, cyanosis, or edema  LYMPH: No lymphadenopathy noted  SKIN: No rashes or lesions    Consultant(s) Notes Reviewed:  [x ] YES  [ ] NO  Care Discussed with Consultants/Other Providers [ x] YES  [ ] NO      LABS:                        11.1   10.95 )-----------( 230      ( 04 Oct 2019 09:49 )             35.0     10-05    131<L>  |  95<L>  |  14  ----------------------------<  97  4.2   |  26  |  1.10    Ca    9.4      05 Oct 2019 04:47  Phos  2.7     10-05  Mg     2.4     10-05         23:10 Urine Cultures: wnl     23:07 Blood Cultures:   - Growth in anaerobic bottle: gram + cocci in pairs  - Growth in aerobic bottle: gram + cocci in clusters  - Gram Stain: MRSA    10/1 01:01 Blood Cultures:  - Growth in anaerobic bottle: gram + cocci in clusters  - Growth in aerobic bottle: gram + cocci in clusters    -MRSA susceptible to vancomycin  - HSV not detected on lesion  - dsDNA, CCP, and anti nuclear factor titer wnl    10/3 13:25 Vanc Trough from 1.25.6    10/3 10:12 Blood Cultures: No growth to date    10/3 16:35 Abscess of Upper Lip Culture: Few Staph Aureus    10/5 4:47 Vanc Trough from 1.5    RADIOLOGY & ADDITIONAL TESTS:  EKG  20:57  Ventricular Rate 104 BPM, Atrial Rate 104 BPM, P-R Interval 170 ms, QRS Duration 78 ms, Q-T Interval 340 ms, QTC Calculation(Bezet) 447 ms, P Axis 38 degrees, R Axis 4 degrees, T Axis 21 degrees, Diagnosis Line SINUS TACHYCARDIA, MINIMAL VOLTAGE CRITERIA FOR LVH, MAY BE NORMAL VARIANT, BORDERLINE ECG    CT Maxillofacial w/ IV contrast  21:23  IMPRESSION:    Mild left facial soft tissue stranding, most pronounced the level of the left mandible and extending anterior to masseter muscle, concerning for cellulitis.   No discrete rim-enhancing collection identified to suggest abscess at this time. Dental hardware generates beam hardening artifact which obscures detailed evaluation.  Tiny punctate bubble of air adjacent to the left mandibular periosteum. Consider oral maxillofacial surgical consultation if there is concern for dental infection.    TTE 10/3 10:52:  Conclusions:  1. Normal mitral valve. Minimal mitral regurgitation.  2. Normal trileaflet aortic valve.  3. Normal left ventricular systolic function. No segmental wall motion abnormalities.  4. Normal right ventricular size and function.  ***Unable to exclude endocarditis on this study. Consider GARLAND if clinically indicated.  *** No previous Echo exam.    Imaging Personally Reviewed:  [X] YES  [ ] NO    MEDICATIONS  (STANDING):  amLODIPine   Tablet 10 milliGRAM(s) Oral daily  cloNIDine 0.1 milliGRAM(s) Oral two times a day  fenofibrate Tablet 145 milliGRAM(s) Oral daily  influenza   Vaccine 0.5 milliLiter(s) IntraMuscular once  sodium chloride 0.9%. 1000 milliLiter(s) (100 mL/Hr) IV Continuous <Continuous>  vancomycin  IVPB 1750 milliGRAM(s) IV Intermittent every 12 hours    MEDICATIONS  (PRN):  acetaminophen   Tablet .. 650 milliGRAM(s) Oral every 6 hours PRN Temp greater or equal to 38C (100.4F)  ibuprofen  Tablet. 600 milliGRAM(s) Oral every 6 hours PRN Moderate Pain (4 - 6)  traMADol 50 milliGRAM(s) Oral every 6 hours PRN Severe Pain (7 - 10) ALPHONSO MORRISSEY  44y Male    Medicine Team 2  Iona Delatorre MS4  Pager     INTERVAL HPI/OVERNIGHT EVENTS: Pt has no overnight events. He states that the discomfort in his lip has decreased today. He denies any fever, chills, nausea, vomiting, diarrhea, or dysuria. He is eating and sleeping well. He has not had a bowel movement, which he attributes to being on a liquid diet. He repots tolerating a regular diet for breakfast today. Pt states he will try warm compresses on his lip to see if they help with the discomfort and swelling.       REVIEW OF SYSTEMS:  CONSTITUTIONAL: no fever overnight, weight loss, or fatigue  EYES: No eye pain, visual disturbances, or discharge  ENMT:  No difficulty hearing, tinnitus, vertigo; No sinus or throat pain, +swollen lip described as a "discomfort" and "pressure" decreased in discomfort from yesterday  NECK: No pain or stiffness  RESPIRATORY: No cough or wheezing; No shortness of breath  CARDIOVASCULAR: No chest pain, palpitations, dizziness, or leg swelling  GASTROINTESTINAL: No abdominal or epigastric pain. No diarrhea.  GENITOURINARY: No dysuria, frequency, hematuria, or incontinence  NEUROLOGICAL: No headaches, memory loss, loss of strength, numbness, or tremors  SKIN: No itching, burning, rashes, or lesions   MUSCULOSKELETAL: No joint pain or swelling; No muscle, back, or extremity pain  ALLERGY AND IMMUNOLOGIC: No hives or eczema    FAMILY HISTORY:  Mother had history of swollen lip   Father had history of HTN     Vital Signs Last 24 Hrs  T(C): 37.1 (05 Oct 2019 09:10), Max: 37.1 (05 Oct 2019 09:10)  T(F): 98.8 (05 Oct 2019 09:10), Max: 98.8 (05 Oct 2019 09:10)  HR: 83 (05 Oct 2019 09:10) (74 - 91)  BP: 124/85 (05 Oct 2019 09:10) (124/85 - 156/97)  BP(mean): --  RR: 18 (05 Oct 2019 09:10) (16 - 18)  SpO2: 97% (05 Oct 2019 09:10) (97% - 99%)    PHYSICAL EXAM:  GENERAL: NAD, well-groomed, well-developed  HEAD:  Atraumatic, Normocephalic  EYES: EOMI, conjunctiva and sclera clear  ENMT: Moist mucous membranes, +missing upper and lower teeth on the sides, +swollen upper lip mainly on the left spreading past midline that is dark in color with some dried blood and softer to the touch than previous examinations  NECK: Supple, No JVD  NERVOUS SYSTEM:  Alert & Oriented X3, Good concentration; Motor Strength 5/5 B/L upper and lower extremities  CHEST/LUNG: Clear to percussion bilaterally; No rales, rhonchi, wheezing, or rubs  HEART: Regular rate and rhythm  ABDOMEN: Soft, Nontender, Nondistended; Bowel sounds present  EXTREMITIES:  2+ Peripheral Pulses, No clubbing, cyanosis, or edema  LYMPH: No lymphadenopathy noted  SKIN: No rashes or lesions    Consultant(s) Notes Reviewed:  [x ] YES  [ ] NO  Care Discussed with Consultants/Other Providers [ x] YES  [ ] NO      LABS:                        11.3   11.25 )-----------( 290      ( 05 Oct 2019 10:23 )             36.0     10-05    131<L>  |  95<L>  |  14  ----------------------------<  97  4.2   |  26  |  1.10    Ca    9.4      05 Oct 2019 04:47  Phos  2.7     10-05  Mg     2.4     10-05         23:10 Urine Cultures: wnl     23:07 Blood Cultures:   - Growth in anaerobic bottle: gram + cocci in pairs  - Growth in aerobic bottle: gram + cocci in clusters  - Gram Stain: MRSA    10/1 01:01 Blood Cultures:  - Growth in anaerobic bottle: gram + cocci in clusters  - Growth in aerobic bottle: gram + cocci in clusters    -MRSA susceptible to vancomycin  - HSV not detected on lesion  - dsDNA, CCP, and anti nuclear factor titer wnl    10/3 13:25 Vanc Trough from 1.25.6    10/3 10:12 Blood Cultures: No growth to date    10/3 16:35 Abscess of Upper Lip Culture: Few Staph Aureus    10/5 4:47 Vanc Trough from 1.5    RADIOLOGY & ADDITIONAL TESTS:  EKG  20:57  Ventricular Rate 104 BPM, Atrial Rate 104 BPM, P-R Interval 170 ms, QRS Duration 78 ms, Q-T Interval 340 ms, QTC Calculation(Bezet) 447 ms, P Axis 38 degrees, R Axis 4 degrees, T Axis 21 degrees, Diagnosis Line SINUS TACHYCARDIA, MINIMAL VOLTAGE CRITERIA FOR LVH, MAY BE NORMAL VARIANT, BORDERLINE ECG    CT Maxillofacial w/ IV contrast  21:23  IMPRESSION:    Mild left facial soft tissue stranding, most pronounced the level of the left mandible and extending anterior to masseter muscle, concerning for cellulitis.   No discrete rim-enhancing collection identified to suggest abscess at this time. Dental hardware generates beam hardening artifact which obscures detailed evaluation.  Tiny punctate bubble of air adjacent to the left mandibular periosteum. Consider oral maxillofacial surgical consultation if there is concern for dental infection.    TTE 10/3 10:52:  Conclusions:  1. Normal mitral valve. Minimal mitral regurgitation.  2. Normal trileaflet aortic valve.  3. Normal left ventricular systolic function. No segmental wall motion abnormalities.  4. Normal right ventricular size and function.  ***Unable to exclude endocarditis on this study. Consider GARLAND if clinically indicated.  *** No previous Echo exam.    Imaging Personally Reviewed:  [X] YES  [ ] NO    MEDICATIONS  (STANDING):  amLODIPine   Tablet 10 milliGRAM(s) Oral daily  cloNIDine 0.1 milliGRAM(s) Oral two times a day  fenofibrate Tablet 145 milliGRAM(s) Oral daily  influenza   Vaccine 0.5 milliLiter(s) IntraMuscular once  sodium chloride 0.9%. 1000 milliLiter(s) (100 mL/Hr) IV Continuous <Continuous>  vancomycin  IVPB 1750 milliGRAM(s) IV Intermittent every 12 hours    MEDICATIONS  (PRN):  acetaminophen   Tablet .. 650 milliGRAM(s) Oral every 6 hours PRN Temp greater or equal to 38C (100.4F)  ibuprofen  Tablet. 600 milliGRAM(s) Oral every 6 hours PRN Moderate Pain (4 - 6)  traMADol 50 milliGRAM(s) Oral every 6 hours PRN Severe Pain (7 - 10)

## 2019-10-05 NOTE — PROGRESS NOTE ADULT - PROBLEM SELECTOR PLAN 4
DVT: Improve score 0, ambulate   Diet: regular  Dispo: Home pending improvement and resolution of bacteremia

## 2019-10-05 NOTE — PROGRESS NOTE ADULT - PROBLEM SELECTOR PLAN 3
Pt with SBP 140s currently, lisinopril dc'ed at admission for concern for angioedema.  - Clonidine 0.1 mg PO BID up from qd  - Amlodipine 10 mg QD

## 2019-10-05 NOTE — PROGRESS NOTE ADULT - PROBLEM SELECTOR PLAN 2
- Bacterial cultures returned + for MRSA bacteremia sensitive to vancomycin, BCx from 10/3 is NGTD  - Vancomycin 1.75g IV Q12  - per ID, hold off on GARLAND for now, check blood cultures over weekend to ensure they remain negative, will ultimately need PICC line for 4 weeks abx.

## 2019-10-06 LAB — PHOSPHATE SERPL-MCNC: 2.4 MG/DL — LOW (ref 2.5–4.5)

## 2019-10-06 PROCEDURE — 99232 SBSQ HOSP IP/OBS MODERATE 35: CPT | Mod: GC

## 2019-10-06 RX ORDER — SODIUM CHLORIDE 9 MG/ML
1000 INJECTION INTRAMUSCULAR; INTRAVENOUS; SUBCUTANEOUS
Refills: 0 | Status: DISCONTINUED | OUTPATIENT
Start: 2019-10-06 | End: 2019-10-08

## 2019-10-06 RX ADMIN — Medication 250 MILLIGRAM(S): at 05:03

## 2019-10-06 RX ADMIN — SODIUM CHLORIDE 100 MILLILITER(S): 9 INJECTION INTRAMUSCULAR; INTRAVENOUS; SUBCUTANEOUS at 11:16

## 2019-10-06 RX ADMIN — Medication 250 MILLIGRAM(S): at 18:38

## 2019-10-06 RX ADMIN — Medication 0.1 MILLIGRAM(S): at 18:38

## 2019-10-06 RX ADMIN — Medication 0.1 MILLIGRAM(S): at 05:03

## 2019-10-06 RX ADMIN — Medication 145 MILLIGRAM(S): at 11:20

## 2019-10-06 RX ADMIN — Medication 62.5 MILLIMOLE(S): at 11:16

## 2019-10-06 RX ADMIN — AMLODIPINE BESYLATE 10 MILLIGRAM(S): 2.5 TABLET ORAL at 05:03

## 2019-10-06 NOTE — PROGRESS NOTE ADULT - PROBLEM SELECTOR PLAN 2
Pressures controlled 130s  Home lisinopril dc'ed on admission for concern of angioedema  - C/w Clonidine 0.1 mg PO BID   - C/w Amlodipine 10 mg QD

## 2019-10-06 NOTE — PROGRESS NOTE ADULT - PROBLEM SELECTOR PLAN 1
- BCx 10/1 +MRSA,  BCx 10/3 NGTD  - Vancomycin 1.75g IV Q12 (9/30 =>, day #7)   - Pending PICC line for 4 weeks abx  - Will consider TTE pending repeat blood cx

## 2019-10-06 NOTE — PROVIDER CONTACT NOTE (CRITICAL VALUE NOTIFICATION) - SITUATION
Abcess upper lip cx on October 3th final result few MRSA
Patient has positive blood culture aerobic and anaerobic growth, gram positive clusters and pairs.

## 2019-10-06 NOTE — PROGRESS NOTE ADULT - ASSESSMENT
45 yo M with PMH of asthma and essential HTN presenting with sepsis 2/2 cellulitis of the upper lip and was found to have MRSA bacteremia, likely from lip abcess with surrounding cellulitis, now s/p I&D.

## 2019-10-06 NOTE — PROGRESS NOTE ADULT - SUBJECTIVE AND OBJECTIVE BOX
***************************************************************  Mark Hellerman, PGY2  Internal Medicine   pager: [facility]: [phone]; [facility]: [phone]  ***************************************************************    ALPHONSO MORRISSEY  44y  MRN: [id number]    Patient is a 44y old  Male who presents with a chief complaint of Cellulitis (05 Oct 2019 10:09)      Subjective:   No events ON. Denies fever, CP, SOB, abn pain, N/V, dysuria.   Poor PO 2/2 swollen lip.     MEDICATIONS  (STANDING):  amLODIPine   Tablet 10 milliGRAM(s) Oral daily  cloNIDine 0.1 milliGRAM(s) Oral two times a day  fenofibrate Tablet 145 milliGRAM(s) Oral daily  influenza   Vaccine 0.5 milliLiter(s) IntraMuscular once  sodium chloride 0.9%. 1000 milliLiter(s) (100 mL/Hr) IV Continuous <Continuous>  sodium chloride 0.9%. 1000 milliLiter(s) (100 mL/Hr) IV Continuous <Continuous>  vancomycin  IVPB 1750 milliGRAM(s) IV Intermittent every 12 hours    MEDICATIONS  (PRN):  acetaminophen   Tablet .. 650 milliGRAM(s) Oral every 6 hours PRN Temp greater or equal to 38C (100.4F)  traMADol 50 milliGRAM(s) Oral every 6 hours PRN Severe Pain (7 - 10)      Objective:    Vitals: Vital Signs Last 24 Hrs  T(C): 36.6 (10-06-19 @ 09:04), Max: 37 (10-05-19 @ 14:38)  T(F): 97.8 (10-06-19 @ 09:04), Max: 98.6 (10-05-19 @ 14:38)  HR: 81 (10-06-19 @ 09:04) (81 - 93)  BP: 130/86 (10-06-19 @ 09:04) (128/90 - 144/89)  BP(mean): --  RR: 17 (10-06-19 @ 09:04) (17 - 18)  SpO2: 98% (10-06-19 @ 09:04) (97% - 99%)            I&O's Summary    05 Oct 2019 07:01  -  06 Oct 2019 07:00  --------------------------------------------------------  IN: 1880 mL / OUT: 0 mL / NET: 1880 mL    06 Oct 2019 07:01  -  06 Oct 2019 13:23  --------------------------------------------------------  IN: 320 mL / OUT: 0 mL / NET: 320 mL        PHYSICAL EXAM:  GENERAL: NAD  EYES: EOMI, conjunctiva and sclera clear  ENMT: MMM, +swollen upper lip  NECK: Supple, No JVD  NERVOUS SYSTEM:  Alert & Oriented X3, Good concentration; Motor Strength 5/5 B/L upper and lower extremities  CHEST/LUNG: Clear to percussion bilaterally; No rales, rhonchi, wheezing, or rubs  HEART: Regular rate and rhythm  ABDOMEN: Soft, Nontender, Nondistended; Bowel sounds present  EXTREMITIES:  2+ Peripheral Pulses, No clubbing, cyanosis, or edema  LYMPH: No lymphadenopathy noted  SKIN: No rashes or lesions    LABS:  10-06    135  |  97  |  19  ----------------------------<  98  4.4   |  25  |  1.23  10-05    131<L>  |  95<L>  |  14  ----------------------------<  97  4.2   |  26  |  1.10  10-04    134<L>  |  97  |  13  ----------------------------<  96  3.8   |  25  |  1.06    Ca    8.6      06 Oct 2019 07:16  Ca    9.4      05 Oct 2019 04:47  Ca    8.8      04 Oct 2019 07:28  Phos  2.4     10-06  Mg     2.3     10-06                                                10.9   11.45 )-----------( 337      ( 06 Oct 2019 09:44 )             34.5                         11.3   11.25 )-----------( 290      ( 05 Oct 2019 10:23 )             36.0                         11.1   10.95 )-----------( 230      ( 04 Oct 2019 09:49 )             35.0     CAPILLARY BLOOD GLUCOSE          RADIOLOGY & ADDITIONAL TESTS:    Imaging Personally Reviewed:  [x ] YES  [ ] NO    Consultants involved in case:   Consultant(s) Notes Reviewed:  [ x] YES  [ ] NO:   Care Discussed with Consultants/Other Providers [x ] YES  [ ] NO ***************************************************************  Mark Hellerman, PGY2  Internal Medicine   pager: [facility]: [phone]; [facility]: [phone]  ***************************************************************    ALPHONSO MORRSISEY  44y  MRN: [id number]    Patient is a 44y old  Male who presents with a chief complaint of Cellulitis (05 Oct 2019 10:09)      Subjective:   No events ON. Denies fever, CP, SOB, abn pain, N/V, dysuria.   Poor PO 2/2 swollen lip.     MEDICATIONS  (STANDING):  amLODIPine   Tablet 10 milliGRAM(s) Oral daily  cloNIDine 0.1 milliGRAM(s) Oral two times a day  fenofibrate Tablet 145 milliGRAM(s) Oral daily  influenza   Vaccine 0.5 milliLiter(s) IntraMuscular once  sodium chloride 0.9%. 1000 milliLiter(s) (100 mL/Hr) IV Continuous <Continuous>  sodium chloride 0.9%. 1000 milliLiter(s) (100 mL/Hr) IV Continuous <Continuous>  vancomycin  IVPB 1750 milliGRAM(s) IV Intermittent every 12 hours    MEDICATIONS  (PRN):  acetaminophen   Tablet .. 650 milliGRAM(s) Oral every 6 hours PRN Temp greater or equal to 38C (100.4F)  traMADol 50 milliGRAM(s) Oral every 6 hours PRN Severe Pain (7 - 10)      Objective:    Vitals: Vital Signs Last 24 Hrs  T(C): 36.6 (10-06-19 @ 09:04), Max: 37 (10-05-19 @ 14:38)  T(F): 97.8 (10-06-19 @ 09:04), Max: 98.6 (10-05-19 @ 14:38)  HR: 81 (10-06-19 @ 09:04) (81 - 93)  BP: 130/86 (10-06-19 @ 09:04) (128/90 - 144/89)  BP(mean): --  RR: 17 (10-06-19 @ 09:04) (17 - 18)  SpO2: 98% (10-06-19 @ 09:04) (97% - 99%)            I&O's Summary    05 Oct 2019 07:01  -  06 Oct 2019 07:00  --------------------------------------------------------  IN: 1880 mL / OUT: 0 mL / NET: 1880 mL    06 Oct 2019 07:01  -  06 Oct 2019 13:23  --------------------------------------------------------  IN: 320 mL / OUT: 0 mL / NET: 320 mL        PHYSICAL EXAM:  GENERAL: NAD  EYES: EOMI, conjunctiva and sclera clear  ENMT: MMM, +swollen upper lip with purulent drainage inside mouth  NECK: Supple, No JVD  NERVOUS SYSTEM:  Alert & Oriented X3, Good concentration; Motor Strength 5/5 B/L upper and lower extremities  CHEST/LUNG: Clear to percussion bilaterally; No rales, rhonchi, wheezing, or rubs  HEART: Regular rate and rhythm  ABDOMEN: Soft, Nontender, Nondistended; Bowel sounds present  EXTREMITIES:  2+ Peripheral Pulses, No clubbing, cyanosis, or edema  LYMPH: No lymphadenopathy noted  SKIN: No rashes or lesions    LABS:  10-06    135  |  97  |  19  ----------------------------<  98  4.4   |  25  |  1.23  10-05    131<L>  |  95<L>  |  14  ----------------------------<  97  4.2   |  26  |  1.10  10-04    134<L>  |  97  |  13  ----------------------------<  96  3.8   |  25  |  1.06    Ca    8.6      06 Oct 2019 07:16  Ca    9.4      05 Oct 2019 04:47  Ca    8.8      04 Oct 2019 07:28  Phos  2.4     10-06  Mg     2.3     10-06                                                10.9   11.45 )-----------( 337      ( 06 Oct 2019 09:44 )             34.5                         11.3   11.25 )-----------( 290      ( 05 Oct 2019 10:23 )             36.0                         11.1   10.95 )-----------( 230      ( 04 Oct 2019 09:49 )             35.0     CAPILLARY BLOOD GLUCOSE          RADIOLOGY & ADDITIONAL TESTS:    Imaging Personally Reviewed:  [x ] YES  [ ] NO    Consultants involved in case:   Consultant(s) Notes Reviewed:  [ x] YES  [ ] NO:   Care Discussed with Consultants/Other Providers [x ] YES  [ ] NO

## 2019-10-07 ENCOUNTER — TRANSCRIPTION ENCOUNTER (OUTPATIENT)
Age: 44
End: 2019-10-07

## 2019-10-07 DIAGNOSIS — Z79.2 LONG TERM (CURRENT) USE OF ANTIBIOTICS: ICD-10-CM

## 2019-10-07 LAB
ANION GAP SERPL CALC-SCNC: 13 MMOL/L — SIGNIFICANT CHANGE UP (ref 5–17)
BASOPHILS # BLD AUTO: 0.09 K/UL — SIGNIFICANT CHANGE UP (ref 0–0.2)
BASOPHILS NFR BLD AUTO: 0.8 % — SIGNIFICANT CHANGE UP (ref 0–2)
BUN SERPL-MCNC: 17 MG/DL — SIGNIFICANT CHANGE UP (ref 7–23)
CALCIUM SERPL-MCNC: 8.8 MG/DL — SIGNIFICANT CHANGE UP (ref 8.4–10.5)
CHLORIDE SERPL-SCNC: 100 MMOL/L — SIGNIFICANT CHANGE UP (ref 96–108)
CO2 SERPL-SCNC: 21 MMOL/L — LOW (ref 22–31)
CREAT SERPL-MCNC: 1.17 MG/DL — SIGNIFICANT CHANGE UP (ref 0.5–1.3)
EOSINOPHIL # BLD AUTO: 0.22 K/UL — SIGNIFICANT CHANGE UP (ref 0–0.5)
EOSINOPHIL NFR BLD AUTO: 1.8 % — SIGNIFICANT CHANGE UP (ref 0–6)
GLUCOSE SERPL-MCNC: 92 MG/DL — SIGNIFICANT CHANGE UP (ref 70–99)
HCT VFR BLD CALC: 37.2 % — LOW (ref 39–50)
HGB BLD-MCNC: 11.7 G/DL — LOW (ref 13–17)
IMM GRANULOCYTES NFR BLD AUTO: 4.4 % — HIGH (ref 0–1.5)
LYMPHOCYTES # BLD AUTO: 2.61 K/UL — SIGNIFICANT CHANGE UP (ref 1–3.3)
LYMPHOCYTES # BLD AUTO: 21.9 % — SIGNIFICANT CHANGE UP (ref 13–44)
MAGNESIUM SERPL-MCNC: 2.4 MG/DL — SIGNIFICANT CHANGE UP (ref 1.6–2.6)
MCHC RBC-ENTMCNC: 19.3 PG — LOW (ref 27–34)
MCHC RBC-ENTMCNC: 31.5 GM/DL — LOW (ref 32–36)
MCV RBC AUTO: 61.3 FL — LOW (ref 80–100)
MONOCYTES # BLD AUTO: 1.06 K/UL — HIGH (ref 0–0.9)
MONOCYTES NFR BLD AUTO: 8.9 % — SIGNIFICANT CHANGE UP (ref 2–14)
NEUTROPHILS # BLD AUTO: 7.43 K/UL — HIGH (ref 1.8–7.4)
NEUTROPHILS NFR BLD AUTO: 62.2 % — SIGNIFICANT CHANGE UP (ref 43–77)
PHOSPHATE SERPL-MCNC: 2.4 MG/DL — LOW (ref 2.5–4.5)
PLATELET # BLD AUTO: 396 K/UL — SIGNIFICANT CHANGE UP (ref 150–400)
POTASSIUM SERPL-MCNC: 4.4 MMOL/L — SIGNIFICANT CHANGE UP (ref 3.5–5.3)
POTASSIUM SERPL-SCNC: 4.4 MMOL/L — SIGNIFICANT CHANGE UP (ref 3.5–5.3)
RBC # BLD: 6.07 M/UL — HIGH (ref 4.2–5.8)
RBC # FLD: 15.5 % — HIGH (ref 10.3–14.5)
SODIUM SERPL-SCNC: 134 MMOL/L — LOW (ref 135–145)
VANCOMYCIN TROUGH SERPL-MCNC: 20.2 UG/ML — HIGH (ref 10–20)
WBC # BLD: 11.94 K/UL — HIGH (ref 3.8–10.5)
WBC # FLD AUTO: 11.94 K/UL — HIGH (ref 3.8–10.5)

## 2019-10-07 PROCEDURE — 99232 SBSQ HOSP IP/OBS MODERATE 35: CPT

## 2019-10-07 PROCEDURE — 99232 SBSQ HOSP IP/OBS MODERATE 35: CPT | Mod: GC

## 2019-10-07 RX ORDER — VANCOMYCIN HCL 1 G
1250 VIAL (EA) INTRAVENOUS ONCE
Refills: 0 | Status: COMPLETED | OUTPATIENT
Start: 2019-10-07 | End: 2019-10-07

## 2019-10-07 RX ORDER — VANCOMYCIN HCL 1 G
1250 VIAL (EA) INTRAVENOUS EVERY 8 HOURS
Refills: 0 | Status: DISCONTINUED | OUTPATIENT
Start: 2019-10-07 | End: 2019-10-08

## 2019-10-07 RX ORDER — VANCOMYCIN HCL 1 G
1.25 VIAL (EA) INTRAVENOUS
Qty: 50 | Refills: 0
Start: 2019-10-07 | End: 2019-11-05

## 2019-10-07 RX ORDER — VANCOMYCIN HCL 1 G
VIAL (EA) INTRAVENOUS
Refills: 0 | Status: DISCONTINUED | OUTPATIENT
Start: 2019-10-07 | End: 2019-10-08

## 2019-10-07 RX ADMIN — AMLODIPINE BESYLATE 10 MILLIGRAM(S): 2.5 TABLET ORAL at 05:13

## 2019-10-07 RX ADMIN — Medication 166.67 MILLIGRAM(S): at 13:32

## 2019-10-07 RX ADMIN — Medication 166.67 MILLIGRAM(S): at 22:55

## 2019-10-07 RX ADMIN — Medication 0.1 MILLIGRAM(S): at 05:13

## 2019-10-07 RX ADMIN — Medication 0.1 MILLIGRAM(S): at 17:34

## 2019-10-07 RX ADMIN — Medication 145 MILLIGRAM(S): at 13:32

## 2019-10-07 NOTE — PROGRESS NOTE ADULT - SUBJECTIVE AND OBJECTIVE BOX
ALPHONSO MORRISSEY  44y Male    Medicine Team 2  Iona Delatorre MS4  Pager [phone]    INTERVAL HPI/OVERNIGHT EVENTS: Pt has no overnight events. He states that he feels "great" and the discomfort in his lip has decreased today to a 2/10 in severity. He denies any fever, chills, nausea, vomiting, diarrhea, or dysuria. He is eating and sleeping well. He reports having regular bowel movements. He repots tolerating a regular diet.       REVIEW OF SYSTEMS:  CONSTITUTIONAL: no fever overnight, weight loss, or fatigue  EYES: No eye pain, visual disturbances, or discharge  ENMT:  No difficulty hearing, tinnitus, vertigo; No sinus or throat pain, +swollen lip described as a "discomfort" and "pressure" progressively decreasing in discomfort   NECK: No pain or stiffness  RESPIRATORY: No cough or wheezing; No shortness of breath  CARDIOVASCULAR: No chest pain, palpitations, dizziness, or leg swelling  GASTROINTESTINAL: No abdominal or epigastric pain. No diarrhea or constipation.  GENITOURINARY: No dysuria, frequency, hematuria, or incontinence  NEUROLOGICAL: No headaches, memory loss, loss of strength, numbness, or tremors  SKIN: No itching, burning, rashes, or lesions   MUSCULOSKELETAL: No joint pain or swelling; No muscle, back, or extremity pain  ALLERGY AND IMMUNOLOGIC: No hives or eczema    FAMILY HISTORY:  Mother had history of swollen lip   Father had history of HTN     Vital Signs Last 24 Hrs  T(C): 36.9 (07 Oct 2019 05:04), Max: 37.4 (07 Oct 2019 00:15)  T(F): 98.4 (07 Oct 2019 05:04), Max: 99.3 (07 Oct 2019 00:15)  HR: 88 (07 Oct 2019 05:04) (84 - 100)  BP: 159/83 (07 Oct 2019 05:04) (113/82 - 159/83)  BP(mean): --  RR: 18 (07 Oct 2019 05:04) (18 - 18)  SpO2: 97% (07 Oct 2019 05:04) (96% - 100%)    PHYSICAL EXAM:  GENERAL: NAD, well-groomed, well-developed  HEAD:  Atraumatic, Normocephalic  EYES: EOMI, conjunctiva and sclera clear  ENMT: Moist mucous membranes, +missing upper and lower teeth on the sides, +swollen upper lip mainly on the left spreading past midline that is dark in color with some dried blood, less swollen than on previous examinations, lip more pliable than on previous examinations  NECK: Supple, No JVD  NERVOUS SYSTEM:  Alert & Oriented X3, Good concentration; Motor Strength 5/5 B/L upper and lower extremities  CHEST/LUNG: Clear to percussion bilaterally; No rales, rhonchi, wheezing, or rubs  HEART: Regular rate and rhythm  ABDOMEN: Soft, Nontender, Nondistended; Bowel sounds present  EXTREMITIES:  2+ Peripheral Pulses, No clubbing, cyanosis, or edema  LYMPH: No lymphadenopathy noted  SKIN: No rashes or lesions    Consultant(s) Notes Reviewed:  [x ] YES  [ ] NO  Care Discussed with Consultants/Other Providers [ x] YES  [ ] NO      LABS:                        11.7   11.94 )-----------( 396      ( 07 Oct 2019 08:45 )             37.2     10-07    134<L>  |  100  |  17  ----------------------------<  92  4.4   |  21<L>  |  1.17    Ca    8.8      07 Oct 2019 04:50  Phos  2.4     10-  Mg     2.4     10-07        9/30 23:10 Urine Cultures: wnl     23:07 Blood Cultures:   - Growth in anaerobic bottle: gram + cocci in pairs  - Growth in aerobic bottle: gram + cocci in clusters  - Gram Stain: MRSA    10/1 01:01 Blood Cultures:  - Growth in anaerobic bottle: gram + cocci in clusters  - Growth in aerobic bottle: gram + cocci in clusters    -MRSA susceptible to vancomycin  - HSV not detected on lesion  - dsDNA, CCP, and anti nuclear factor titer wnl    10/3 13:25 Vanc Trough from 1.25.6    10/3 10:12 Blood Cultures: No growth to date    10/3 16:35 Abscess of Upper Lip Culture: Few Staph Aureus    10/3 16:35 Abscess of Upper Lip Culture: Few MRSA    10/5 4:47 Vanc Trough from 1.5    10/5 9:00 Blood Cultures: no growth    10/7 4:50 Vanc Trough from 1.75g: Vanc tough 20.2    RADIOLOGY & ADDITIONAL TESTS:  EKG  20:57  Ventricular Rate 104 BPM, Atrial Rate 104 BPM, P-R Interval 170 ms, QRS Duration 78 ms, Q-T Interval 340 ms, QTC Calculation(Bezet) 447 ms, P Axis 38 degrees, R Axis 4 degrees, T Axis 21 degrees, Diagnosis Line SINUS TACHYCARDIA, MINIMAL VOLTAGE CRITERIA FOR LVH, MAY BE NORMAL VARIANT, BORDERLINE ECG    CT Maxillofacial w/ IV contrast  21:23  IMPRESSION:    Mild left facial soft tissue stranding, most pronounced the level of the left mandible and extending anterior to masseter muscle, concerning for cellulitis.   No discrete rim-enhancing collection identified to suggest abscess at this time. Dental hardware generates beam hardening artifact which obscures detailed evaluation.  Tiny punctate bubble of air adjacent to the left mandibular periosteum. Consider oral maxillofacial surgical consultation if there is concern for dental infection.    TTE 10/3 10:52:  Conclusions:  1. Normal mitral valve. Minimal mitral regurgitation.  2. Normal trileaflet aortic valve.  3. Normal left ventricular systolic function. No segmental wall motion abnormalities.  4. Normal right ventricular size and function.  ***Unable to exclude endocarditis on this study. Consider [facility] if clinically indicated.  *** No previous Echo exam.    Imaging Personally Reviewed:  [X] YES  [ ] NO    MEDICATIONS  (STANDING):  amLODIPine   Tablet 10 milliGRAM(s) Oral daily  cloNIDine 0.1 milliGRAM(s) Oral two times a day  fenofibrate Tablet 145 milliGRAM(s) Oral daily  influenza   Vaccine 0.5 milliLiter(s) IntraMuscular once  sodium chloride 0.9%. 1000 milliLiter(s) (100 mL/Hr) IV Continuous <Continuous>  sodium chloride 0.9%. 1000 milliLiter(s) (100 mL/Hr) IV Continuous <Continuous>  vancomycin  IVPB 1750 milliGRAM(s) IV Intermittent every 12 hours    MEDICATIONS  (PRN):  acetaminophen   Tablet .. 650 milliGRAM(s) Oral every 6 hours PRN Temp greater or equal to 38C (100.4F)  traMADol 50 milliGRAM(s) Oral every 6 hours PRN Severe Pain (7 - 10)

## 2019-10-07 NOTE — PROGRESS NOTE ADULT - SUBJECTIVE AND OBJECTIVE BOX
ALPHONSO MORRISSEY 44y [id number]    Patient is a 44y old  Male who presents with a chief complaint of Cellulitis (07 Oct 2019 10:16)      Follow Up/CC:  ID following for mrsa    Interval History/ROS: feels better, no fever    Allergies    No Known Allergies    Intolerances        ANTIMICROBIALS:  vancomycin  IVPB    vancomycin  IVPB 1250 every 8 hours      MEDICATIONS  (STANDING):  amLODIPine   Tablet 10 milliGRAM(s) Oral daily  cloNIDine 0.1 milliGRAM(s) Oral two times a day  fenofibrate Tablet 145 milliGRAM(s) Oral daily  influenza   Vaccine 0.5 milliLiter(s) IntraMuscular once  sodium chloride 0.9%. 1000 milliLiter(s) (100 mL/Hr) IV Continuous <Continuous>  sodium chloride 0.9%. 1000 milliLiter(s) (100 mL/Hr) IV Continuous <Continuous>  vancomycin  IVPB      vancomycin  IVPB 1250 milliGRAM(s) IV Intermittent every 8 hours    MEDICATIONS  (PRN):  acetaminophen   Tablet .. 650 milliGRAM(s) Oral every 6 hours PRN Temp greater or equal to 38C (100.4F)  traMADol 50 milliGRAM(s) Oral every 6 hours PRN Severe Pain (7 - 10)        Vital Signs Last 24 Hrs  T(C): 36.8 (07 Oct 2019 11:10), Max: 37.4 (07 Oct 2019 00:15)  T(F): 98.2 (07 Oct 2019 11:10), Max: 99.3 (07 Oct 2019 00:15)  HR: 92 (07 Oct 2019 11:10) (85 - 100)  BP: 127/84 (07 Oct 2019 11:10) (113/82 - 159/83)  BP(mean): --  RR: 18 (07 Oct 2019 11:10) (18 - 18)  SpO2: 98% (07 Oct 2019 11:10) (96% - 100%)    CBC Full  -  ( 07 Oct 2019 08:45 )  WBC Count : 11.94 K/uL  RBC Count : 6.07 M/uL  Hemoglobin : 11.7 g/dL  Hematocrit : 37.2 %  Platelet Count - Automated : 396 K/uL  Mean Cell Volume : 61.3 fl  Mean Cell Hemoglobin : 19.3 pg  Mean Cell Hemoglobin Concentration : 31.5 gm/dL  Auto Neutrophil # : 7.43 K/uL  Auto Lymphocyte # : 2.61 K/uL  Auto Monocyte # : 1.06 K/uL  Auto Eosinophil # : 0.22 K/uL  Auto Basophil # : 0.09 K/uL  Auto Neutrophil % : 62.2 %  Auto Lymphocyte % : 21.9 %  Auto Monocyte % : 8.9 %  Auto Eosinophil % : 1.8 %  Auto Basophil % : 0.8 %    10-07    134<L>  |  100  |  17  ----------------------------<  92  4.4   |  21<L>  |  1.17    Ca    8.8      07 Oct 2019 04:50  Phos  2.4     10-07  Mg     2.4     10-07            MICROBIOLOGY:  .Blood  10-05-19   No growth to date.  --  --      .Abscess Upper lip  10-03-19   Few Methicillin resistant Staphylococcus aureus  --  Methicillin resistant Staphylococcus aureus      .Blood  10-03-19   No growth to date.  --  --      .Blood  10-01-19   Growth in aerobic and anaerobic bottles: Methicillin resistant  Staphylococcus aureus  See previous culture [id number]  --    Growth in aerobic bottle: Gram Positive Cocci in Clusters  Growth in anaerobic bottle: Gram Positive Cocci in Clusters      .Urine  09-30-19   <10,000 CFU/mL Normal Urogenital [place]  --  --      .Blood  09-30-19   Growth in aerobic and anaerobic bottles: Methicillin resistant  Staphylococcus aureus  "Due to technical problems, Proteus sp. will Not be reported as part of  the BCID panel until further notice"  ***Blood Panel PCR results on this specimen are available  approximately 3 hours after the Gram stain result.***  Gram stain, PCR, and/or culture results may not always  correspond due to difference in methodologies.  ************************************************************  This PCR assay was performed using [employer].  The following targets are tested for: Enterococcus,  vancomycin resistant enterococci, Listeria monocytogenes,  coagulase negative staphylococci, S. aureus,  methicillin resistant S. aureus, Streptococcus agalactiae  (Group B), S. pneumoniae, S. pyogenes (Group A),  Acinetobacter baumannii, Enterobacter cloacae, E. coli,  Klebsiella oxytoca, K. pneumoniae, Proteus sp.,  Serratia marcescens, Haemophilus influenzae,  Neisseria meningitidis, Pseudomonas aeruginosa, Candida  albicans, C. glabrata, C krusei, C parapsilosis,  C. tropicalis and the KPC resistance gene.  --  Blood Culture PCR  Methicillin resistant Staphylococcus aureus      Vancomycin Level, Trough: 20.2 ug/mL (10-07-19 @ 04:50)      RADIOLOGY    < from: CT Maxillofacial w/ IV Cont (09.30.19 @ 21:23) >  Mild left facial soft tissue stranding, most pronounced the level of the   left mandible and extending anterior to masseter muscle, concerning for   cellulitis.     No discrete rim-enhancing collection identified to suggest abscess at   this time. Dental hardware generates beam hardening artifact which   obscures detailed evaluation.  Tiny punctate bubble of air adjacent to   the left mandibular periosteum. Consider oral maxillofacial surgical   consultation if there is concern for dental infection.    < end of copied text >

## 2019-10-07 NOTE — PROGRESS NOTE ADULT - PROBLEM SELECTOR PLAN 3
Pt with SBP 140s currently, lisinopril dc'ed at admission for concern for angioedema.  - Clonidine 0.1 mg PO BID up from qd  - Amlodipine 10 mg QD Pt with SBP 140s currently, lisinopril dc'ed at admission for concern for angioedema.  - Clonidine 0.1 mg PO BID up from qd at home; pt tolerating well  - Amlodipine 10 mg QD, also up from home dose.

## 2019-10-07 NOTE — PROGRESS NOTE ADULT - PROBLEM SELECTOR PLAN 2
S/p plastics I&D on 10/3. Abscess culture showed few MRSA.   - 10/7 AM Vanc Trough prior to fourth dose on Vancomycin 1.75g IV Q12 came back as 20.2. Held AM dose. Decreased vanc to 1.5g Q12  - per plastics, f/u outpatient end of this week S/p plastics I&D on 10/3. Abscess culture showed few MRSA.   - 10/7 AM Vanc Trough prior to fourth dose on Vancomycin 1.75g IV Q12 came back as 20.2. Held AM dose. Per ID recs, 1.25g vacn q8  - per plastics, f/u outpatient end of this week

## 2019-10-07 NOTE — DISCHARGE NOTE PROVIDER - PROVIDER TOKENS
PROVIDER:[TOKEN:[[id number]]],PROVIDER:[TOKEN:[[id number]]] PROVIDER:[TOKEN:[[id number]]],PROVIDER:[TOKEN:[[id number]]],FREE:[LAST:[Deann],FIRST:[Albaro],PHONE:[phone]],FAX:[(   )    -]]

## 2019-10-07 NOTE — DISCHARGE NOTE PROVIDER - NSDCCPTREATMENT_GEN_ALL_CORE_FT
PRINCIPAL PROCEDURE  Procedure: Incision and drainage of single abscess  Findings and Treatment: Dr. Dixon from plastic surgery incised and drained the abscess on your lip. The abscess was tested and returned positive for bacteria known as MRSA.      SECONDARY PROCEDURE  Procedure: CT maxillofacial w/ contrast  Findings and Treatment: The CT scan from the emergency department suggested that you had cellulitis.    Procedure: Transthoracic echocardiography  Findings and Treatment: The echocardiograph of your heart did not show any concerning abnormalities associated with your infection.    Procedure: Blood culture  Findings and Treatment: Your blood showed bacteria known as MRSA that most likely came from the infection in your lip. Your blood cultures have been negative for bacteria for at least 5 days. You will be on intravenous antibiotics for at least 4 weeks because your blood tested positive for MRSA. PRINCIPAL PROCEDURE  Procedure: Incision and drainage of single abscess  Findings and Treatment: Dr. Dixon from plastic surgery incised and drained the abscess on your lip. The abscess was tested and returned positive for bacteria known as MRSA.  Please follow up with Dr. Dixon on discharge.      SECONDARY PROCEDURE  Procedure: Transthoracic echocardiography  Findings and Treatment: The echocardiograph of your heart did not show any concerning abnormalities associated with your infection.    Procedure: Blood culture  Findings and Treatment: Your blood showed bacteria known as MRSA that most likely came from the infection in your lip. Your blood cultures have been negative for bacteria for at least 5 days. You will be on intravenous antibiotics for at least 4 weeks because your blood tested positive for MRSA.    Procedure: CT maxillofacial w/ contrast  Findings and Treatment: The CT scan from the emergency department suggested that you had cellulitis.

## 2019-10-07 NOTE — PROVIDER CONTACT NOTE (MEDICATION) - ACTION/TREATMENT ORDERED:
Dr. bell to speak to pharmacy and look into it. orders to follow.
MD Walters made aware. okay to hold am dose. will continue to monitor.

## 2019-10-07 NOTE — DISCHARGE NOTE PROVIDER - HOSPITAL COURSE
Mr. Hernandez is a 44 year old man with a past medical history of hypertension and past surgical history of dental surgery presenting with acute onset lip swelling. He originally met sepsis criteria with leukocytosis, fevers, and a source of non-purulent cellulitis of the upper lip. Dental was consulted in the ED and did not recommend any intervention at that time. CT oral maxillofacial showed mild left facial soft tissue swelling concerning for cellulitis with no evidence of abscess. He was started on vancomycin in the ED. Zosyn was added for anaerobic coverage, as it was unclear if the cellulitis was secondary to a dental origin. Infectious Disease was consulted, and Zosyn was discontinued while Unasyn was added for oral judy coverage. Acyclovir was added for HSV coverage. Lisinopril was held due to concern for potential angioedema. Autoimmune serologies were checked and returned wnl, except for a rheumatoid factor elevated to 17. He was found to be HIV negative. Blood cultures returned positive for MRSA bacteremia. Unasyn was discontinued, as was acyclovir when the HSV swab of the lesion was negative. He was continuing to spike fevers, so dental was reconsulted and recommended outpatient dental follow-up and inpatient plastic surgery consultation. Plastic surgery performed an incision & drainage of the lip and recommended outpatient follow up. Cultures from the incision & drainage returned positive for MRSA. He was continued on vancomycin and his blood cultures were trended. A TTE was performed to rule out endocarditis and returned without any significant findings, but was unable to rule out endocarditis. Infectious Disease recommended holding off on GARLAND and trending blood cultures, as they began to return without showing any growth. Per Infectious Disease recommendations, a PICC line was placed for at least 4 weeks of vancomycin treatment. He was discharged from the hospital on 10/__/19 with outpatient plastic surgery follow-up, outpatient dental follow-up, and outpatient infectious disease follow-up appointments recommended. Mr. Hernandez is a 44 year old man with a past medical history of hypertension and past surgical history of dental surgery presenting with acute onset lip swelling. He originally met sepsis criteria with leukocytosis, fevers, and a source of non-purulent cellulitis of the upper lip. Dental was consulted in the ED and did not recommend any intervention at that time. CT oral maxillofacial showed mild left facial soft tissue swelling concerning for cellulitis with no evidence of abscess. He was started on vancomycin in the ED. Zosyn was added for anaerobic coverage, as it was unclear if the cellulitis was secondary to a dental origin. Infectious Disease was consulted, and Zosyn was discontinued while Unasyn was added for oral judy coverage. Acyclovir was added for HSV coverage. Lisinopril was held due to concern for potential angioedema. Autoimmune serologies were checked and returned within normal limits, except for a rheumatoid factor elevated to 17. He was found to be HIV negative. Blood cultures returned positive for MRSA bacteremia. Unasyn was discontinued, as was acyclovir when the HSV swab of the lesion was negative. He was continuing to spike fevers, so dental was reconsulted and recommended outpatient dental follow-up and inpatient plastic surgery consultation. Plastic surgery performed an incision & drainage of the lip and recommended outpatient follow up. Cultures from the incision & drainage returned positive for MRSA. He was continued on vancomycin and his blood cultures were trended. A TTE was performed to rule out endocarditis and returned without any significant findings, but was unable to rule out endocarditis. Infectious Disease recommended holding off on GARLAND and trending blood cultures, as they began to return without showing any growth. Per Infectious Disease recommendations, a PICC line was placed for at least 4 weeks of vancomycin treatment. He was discharged from the hospital with outpatient plastic surgery follow-up, outpatient dental follow-up, and outpatient infectious disease follow-up appointments recommended. Mr. Hernandez is a 44 year old man with a past medical history of hypertension and past surgical history of dental surgery presenting with acute onset lip swelling admitted on 9/30. He originally met sepsis criteria with leukocytosis, fevers, and a source of non-purulent cellulitis of the upper lip. Dental was consulted in the ED and did not recommend any intervention at that time. CT oral maxillofacial showed mild left facial soft tissue swelling concerning for cellulitis with no evidence of abscess. He was started on vancomycin in the ED. Zosyn was added for anaerobic coverage, as it was unclear if the cellulitis was secondary to a dental origin.         Infectious Disease was consulted, and Zosyn was discontinued while Unasyn was added for oral judy coverage. Acyclovir was added for HSV coverage as patient stated he had a history of lip sores. ACEi on admission was held due to concern for potential angioedema, however no angioedema was noted. Autoimmune serologies were checked and returned within normal limits, except for a rheumatoid factor elevated to 17. He was found to be HIV negative. Blood cultures returned positive for MRSA bacteremia from initial blood cultures on 9.30, which cleared by 10/3. Unasyn was discontinued, as was acyclovir when the HSV swab of the lesion was negative. He was continuing to spike fevers, so dental was reconsulted and recommended outpatient dental follow-up and inpatient plastic surgery consultation. By 10/3, lip began to look larger with fluctuance so plastic surgery was consulted who performed an incision & drainage of the lip and recommended outpatient follow up. Cultures from the incision & drainage returned positive for MRSA, consistent with his bacteremia. He was continued on vancomycin and his blood cultures were trended. A TTE was performed to rule out endocarditis and returned without any significant findings. Infectious Disease recommended holding off on GARLAND and trending blood cultures, as they began to return without showing any growth. Per Infectious Disease recommendations, a PICC line was placed for at least 4 weeks of vancomycin treatment. He was discharged from the hospital with outpatient plastic surgery follow-up, outpatient dental follow-up, and outpatient infectious disease follow-up appointments recommended on 1g q8 vancomycin, trough at 19 on discharge.         Upon discharge, patient was ambulating, tolerating diet, BP under control.  BP medications were increased during admission with amlodipine increased to 10 mg daily, and clonidine increased to 0.1 BID.  Benzopril was held at discharge. Mr. Hernandez is a 44 year old man with a past medical history of hypertension and past surgical history of dental surgery presenting with acute onset lip swelling admitted on 9/30. He originally met sepsis criteria with leukocytosis, fevers, and a source of non-purulent cellulitis of the upper lip. Dental was consulted in the ED and did not recommend any intervention at that time. CT oral maxillofacial showed mild left facial soft tissue swelling concerning for cellulitis with no evidence of abscess. He was admitted with sepsis 2/2 suspected upper lip abscess and started on IV vancomycin and IV Zosyn. The patient was admitted to the medical floor, he remained hemodynamicallky stable.         Infectious Disease was consulted, and Zosyn was discontinued while Unasyn was added for oral judy coverage. Acyclovir was added for HSV coverage as patient stated he had a history of lip sores. ACEi on admission was held due to concern for potential angioedema, however no angioedema was noted. Autoimmune serologies were checked and returned within normal limits, except for a rheumatoid factor elevated to 17. He was found to be HIV negative. Blood cultures returned positive for MRSA bacteremia from initial blood cultures on 9.30, which cleared by 10/3. Unasyn was discontinued, as was acyclovir when the HSV swab of the lesion was negative. He was continuing to spike fevers, so dental was reconsulted and recommended outpatient dental follow-up and inpatient plastic surgery consultation. By 10/3, lip began to look larger with fluctuance so plastic surgery was consulted who performed an incision & drainage of the lip and recommended outpatient follow up. Cultures from the incision & drainage returned positive for MRSA, consistent with his bacteremia. He was continued on vancomycin and his blood cultures were trended. A TTE was performed to rule out endocarditis and returned without any significant findings. Infectious Disease recommended holding off on GARLAND and trending blood cultures, as they began to return without showing any growth. Per Infectious Disease recommendations, a PICC line was placed for at least 4 weeks of vancomycin treatment. He was discharged from the hospital with outpatient plastic surgery follow-up, outpatient dental follow-up, and outpatient infectious disease follow-up appointments recommended on 1g q8 vancomycin, trough at 19 on discharge.         Upon discharge, patient was ambulating, tolerating diet, BP under control.  BP medications were increased during admission with amlodipine increased to 10 mg daily, and clonidine increased to 0.1 BID.  Benzopril was held at discharge.

## 2019-10-07 NOTE — DISCHARGE NOTE PROVIDER - NSDCCPCAREPLAN_GEN_ALL_CORE_FT
PRINCIPAL DISCHARGE DIAGNOSIS  Diagnosis: Cellulitis  Assessment and Plan of Treatment: You came in with swelling of the upper lip. A plastic surgeon drained the lesion and showed bacteria. You will follow-up outpatient with the plastic surgeon. You are advised to avoid contact with others with your lip, as bacteria is present in the area.      SECONDARY DISCHARGE DIAGNOSES  Diagnosis: MRSA bacteremia  Assessment and Plan of Treatment: The bacteria from your lip spread to your blood. You will need to be on intravenous antibiotics for at least four weeks. You will need to follow-up outpatient with infectious disease. PRINCIPAL DISCHARGE DIAGNOSIS  Diagnosis: Cellulitis  Assessment and Plan of Treatment: You came in with swelling of the upper lip. A plastic surgeon drained the lesion and showed bacteria. You will follow-up outpatient with the plastic surgeon. You are advised to avoid contact with others with your lip, as bacteria is present in the area. You should also follow-up with a dentist outpatient once your plastic surgery and infectious disease doctors say it is safe.      SECONDARY DISCHARGE DIAGNOSES  Diagnosis: MRSA bacteremia  Assessment and Plan of Treatment: The bacteria from your lip spread to your blood. You will need to be on intravenous antibiotics for at least four weeks. You will need to follow-up outpatient with infectious disease. PRINCIPAL DISCHARGE DIAGNOSIS  Diagnosis: Cellulitis  Assessment and Plan of Treatment: You came in with swelling of the upper lip. A plastic surgeon, Dr. Dixon, drained the lesion and showed bacteria. You will follow-up outpatient with Dr. Dixon. You are advised to avoid contact with others with your lip, as bacteria is present in the area. You should also follow-up with a dentist outpatient once your plastic surgery and infectious disease doctors say it is safe.      SECONDARY DISCHARGE DIAGNOSES  Diagnosis: MRSA bacteremia  Assessment and Plan of Treatment: The bacteria from your lip spread to your blood. You will need to be on intravenous antibiotics for at least four weeks. You will need to follow-up outpatient with Dr. Man from infectious disease. PRINCIPAL DISCHARGE DIAGNOSIS  Diagnosis: Cellulitis  Assessment and Plan of Treatment: You came in with swelling of the upper lip. A plastic surgeon, Dr. Dixon, drained the lesion and showed bacteria. You will follow-up outpatient with Dr. Dixon. You are advised to avoid contact with others with your lip, as bacteria is present in the area. You should also follow-up with a dentist outpatient once your plastic surgery and infectious disease doctors say it is safe.      SECONDARY DISCHARGE DIAGNOSES  Diagnosis: Hypertension  Assessment and Plan of Treatment: You have high blood pressures.  You pressures were increased during your hospitalization.  Your benzopril was held while in the hospital because of concern that it contributed to your lip swelling.  Your clonidine and amlodipine were increased during your hospital stay to better control your blood pressure.  Please take 10mg amlodipine daily and 0.1 mg clonidine TWICE per day on discharge.  Follow up with your primary care phsycian on discharge.    Diagnosis: MRSA bacteremia  Assessment and Plan of Treatment: The bacteria from your lip spread to your blood. You will need to be on intravenous antibiotics for at least four weeks. You will need to follow-up outpatient with Dr. Man from infectious disease.  A nurse will come to your home to teach you how to administer your IV antibiotics properly. PRINCIPAL DISCHARGE DIAGNOSIS  Diagnosis: MRSA (methicillin resistant Staphylococcus aureus) septicemia  Assessment and Plan of Treatment: Blood c/s grew MRSA, likely from upper lip abscess. On IV vancomycin per ID to complte thru 11/2/19      SECONDARY DISCHARGE DIAGNOSES  Diagnosis: Lip abscess  Assessment and Plan of Treatment: Upper lip had I/D by Plastic Surgery. Grew MRSA. continue IV vancomycin and follow up with Dr Dixon (Plastic Surgery tomorrow.    Diagnosis: MRSA bacteremia  Assessment and Plan of Treatment: MRSA bacteremia was negative on repeat blood c/s. Plans as above. Home care set up for IV vancomycin 1 gm q 8 hrs via PICC daily till 11/2/19    Diagnosis: Hypertension  Assessment and Plan of Treatment: You have high blood pressures.  You pressures were increased during your hospitalization.  Your benzopril was held while in the hospital because of concern that it contributed to your lip swelling.  Your clonidine and amlodipine were increased during your hospital stay to better control your blood pressure.  Please take 10mg amlodipine daily and 0.1 mg clonidine TWICE per day on discharge.  Follow up with your primary care phsycian on discharge.

## 2019-10-07 NOTE — DISCHARGE NOTE PROVIDER - CARE PROVIDERS DIRECT ADDRESSES
,DirectAddress_Unknown,[email or web address] ,DirectAddress_Unknown,[email or web address],DirectAddress_Unknown

## 2019-10-07 NOTE — PROGRESS NOTE ADULT - PROBLEM SELECTOR PLAN 1
- Bacterial cultures returned + for MRSA bacteremia sensitive to vancomycin, BCx from 10/3 is NGTD  - 10/7 AM Vanc Trough prior to fourth dose on Vancomycin 1.75g IV Q12 came back as 20.2. Held AM dose. Decreased vanc to 1.5g Q12  - per ID, hold off on GARLAND for now, check blood cultures over weekend to ensure they remain negative, will ultimately need PICC line for 4 weeks abx.  - Blood cultures negative over weekend - Bacterial cultures returned + for MRSA bacteremia sensitive to vancomycin, BCx from 10/3 is NGTD  - 10/7 AM Vanc Trough prior to fourth dose on Vancomycin 1.75g IV Q12 came back as 20.2. Held AM dose. Decreased vanc to 1.5g Q12  - per ID, hold off on GARLAND for now, check blood cultures over weekend to ensure they remain negative, will ultimately need PICC line for 4 weeks abx.  - Blood cultures negative over weekend  - f/u PICC placement today - Bacterial cultures returned + for MRSA bacteremia sensitive to vancomycin, BCx from 10/3 is NGTD  - 10/7 AM Vanc Trough prior to fourth dose on Vancomycin 1.75g IV Q12 came back as 20.2. Held AM dose. Per ID recs, 1.25g vanc q8  - per ID, hold off on GARLAND for now, check blood cultures over weekend to ensure they remain negative, will ultimately need PICC line for 4 weeks abx.  - Blood cultures negative over weekend  - f/u PICC placement today - Now resolved, BCx from 10/5 NGTD  - 10/7 AM Vanc Trough prior to fourth dose on Vancomycin 1.75g IV Q12 came back as 20.2. Held AM dose. Per ID recs, 1.25g vanc q8  - per ID, hold off on GARLAND for now, check blood cultures over weekend to ensure they remain negative, will ultimately need PICC line for 4 weeks abx, stop date 11/2  - Blood cultures negative over weekend  - f/u PICC placement today

## 2019-10-07 NOTE — PROGRESS NOTE ADULT - PROBLEM SELECTOR PLAN 4
DVT: Improve score 0, ambulate   Diet: regular  Dispo: Home pending improvement and resolution of bacteremia DVT: Improve score 0, ambulate   Diet: regular  Dispo: Home with PICC and 4 weeks abx

## 2019-10-07 NOTE — DISCHARGE NOTE PROVIDER - NSDCFUADDAPPT_GEN_ALL_CORE_FT
You need to make an appointment with Dr. Dixon, your Plastic Surgeon, for follow up ASAP.  Please call [phone] for an appointment    Please make an appointment with Dr. Man from Infectious Disease on discharge.  [phone] for an appointment. You need to make an appointment with Dr. Dixon, your Plastic Surgeon, for follow up ASAP.  Please call [phone] for an appointment    Please make an appointment with Dr. Man from Infectious Disease on discharge.  [phone] for an appointment.    Please call your PMD, Dr. Zacarias, for an appointment.  Call [phone] for an appointment.

## 2019-10-08 ENCOUNTER — TRANSCRIPTION ENCOUNTER (OUTPATIENT)
Age: 44
End: 2019-10-08

## 2019-10-08 LAB
ANION GAP SERPL CALC-SCNC: 12 MMOL/L — SIGNIFICANT CHANGE UP (ref 5–17)
BASOPHILS # BLD AUTO: 0.09 K/UL — SIGNIFICANT CHANGE UP (ref 0–0.2)
BASOPHILS NFR BLD AUTO: 0.9 % — SIGNIFICANT CHANGE UP (ref 0–2)
BUN SERPL-MCNC: 17 MG/DL — SIGNIFICANT CHANGE UP (ref 7–23)
CALCIUM SERPL-MCNC: 9 MG/DL — SIGNIFICANT CHANGE UP (ref 8.4–10.5)
CHLORIDE SERPL-SCNC: 97 MMOL/L — SIGNIFICANT CHANGE UP (ref 96–108)
CO2 SERPL-SCNC: 24 MMOL/L — SIGNIFICANT CHANGE UP (ref 22–31)
CREAT SERPL-MCNC: 1.3 MG/DL — SIGNIFICANT CHANGE UP (ref 0.5–1.3)
CULTURE RESULTS: SIGNIFICANT CHANGE UP
CULTURE RESULTS: SIGNIFICANT CHANGE UP
EOSINOPHIL # BLD AUTO: 0.09 K/UL — SIGNIFICANT CHANGE UP (ref 0–0.5)
EOSINOPHIL NFR BLD AUTO: 0.9 % — SIGNIFICANT CHANGE UP (ref 0–6)
GLUCOSE SERPL-MCNC: 84 MG/DL — SIGNIFICANT CHANGE UP (ref 70–99)
HCT VFR BLD CALC: 36.2 % — LOW (ref 39–50)
HGB BLD-MCNC: 11.4 G/DL — LOW (ref 13–17)
IMM GRANULOCYTES NFR BLD AUTO: 4.6 % — HIGH (ref 0–1.5)
LYMPHOCYTES # BLD AUTO: 2.26 K/UL — SIGNIFICANT CHANGE UP (ref 1–3.3)
LYMPHOCYTES # BLD AUTO: 21.8 % — SIGNIFICANT CHANGE UP (ref 13–44)
MAGNESIUM SERPL-MCNC: 2.5 MG/DL — SIGNIFICANT CHANGE UP (ref 1.6–2.6)
MCHC RBC-ENTMCNC: 19.2 PG — LOW (ref 27–34)
MCHC RBC-ENTMCNC: 31.5 GM/DL — LOW (ref 32–36)
MCV RBC AUTO: 60.9 FL — LOW (ref 80–100)
MONOCYTES # BLD AUTO: 0.75 K/UL — SIGNIFICANT CHANGE UP (ref 0–0.9)
MONOCYTES NFR BLD AUTO: 7.2 % — SIGNIFICANT CHANGE UP (ref 2–14)
NEUTROPHILS # BLD AUTO: 6.7 K/UL — SIGNIFICANT CHANGE UP (ref 1.8–7.4)
NEUTROPHILS NFR BLD AUTO: 64.6 % — SIGNIFICANT CHANGE UP (ref 43–77)
PHOSPHATE SERPL-MCNC: 2.4 MG/DL — LOW (ref 2.5–4.5)
PLATELET # BLD AUTO: 453 K/UL — HIGH (ref 150–400)
POTASSIUM SERPL-MCNC: 4.2 MMOL/L — SIGNIFICANT CHANGE UP (ref 3.5–5.3)
POTASSIUM SERPL-SCNC: 4.2 MMOL/L — SIGNIFICANT CHANGE UP (ref 3.5–5.3)
RBC # BLD: 5.94 M/UL — HIGH (ref 4.2–5.8)
RBC # FLD: 16.8 % — HIGH (ref 10.3–14.5)
SODIUM SERPL-SCNC: 133 MMOL/L — LOW (ref 135–145)
SPECIMEN SOURCE: SIGNIFICANT CHANGE UP
SPECIMEN SOURCE: SIGNIFICANT CHANGE UP
VANCOMYCIN TROUGH SERPL-MCNC: 22.7 UG/ML — HIGH (ref 10–20)
WBC # BLD: 10.37 K/UL — SIGNIFICANT CHANGE UP (ref 3.8–10.5)
WBC # FLD AUTO: 10.37 K/UL — SIGNIFICANT CHANGE UP (ref 3.8–10.5)

## 2019-10-08 PROCEDURE — 71045 X-RAY EXAM CHEST 1 VIEW: CPT | Mod: 26

## 2019-10-08 PROCEDURE — 99232 SBSQ HOSP IP/OBS MODERATE 35: CPT

## 2019-10-08 RX ORDER — VANCOMYCIN HCL 1 G
1000 VIAL (EA) INTRAVENOUS EVERY 8 HOURS
Refills: 0 | Status: DISCONTINUED | OUTPATIENT
Start: 2019-10-08 | End: 2019-10-08

## 2019-10-08 RX ORDER — CHLORHEXIDINE GLUCONATE 213 G/1000ML
1 SOLUTION TOPICAL DAILY
Refills: 0 | Status: DISCONTINUED | OUTPATIENT
Start: 2019-10-08 | End: 2019-10-10

## 2019-10-08 RX ADMIN — Medication 0.1 MILLIGRAM(S): at 05:21

## 2019-10-08 RX ADMIN — Medication 62.5 MILLIMOLE(S): at 10:08

## 2019-10-08 RX ADMIN — AMLODIPINE BESYLATE 10 MILLIGRAM(S): 2.5 TABLET ORAL at 05:21

## 2019-10-08 RX ADMIN — CHLORHEXIDINE GLUCONATE 1 APPLICATION(S): 213 SOLUTION TOPICAL at 05:21

## 2019-10-08 RX ADMIN — Medication 166.67 MILLIGRAM(S): at 05:22

## 2019-10-08 RX ADMIN — Medication 145 MILLIGRAM(S): at 13:06

## 2019-10-08 RX ADMIN — Medication 0.1 MILLIGRAM(S): at 17:35

## 2019-10-08 NOTE — PROGRESS NOTE ADULT - SUBJECTIVE AND OBJECTIVE BOX
ALPHONSO MORRISSEY  44y Male    Medicine Team 2  Iona Delatorre MS4  Pager     INTERVAL HPI/OVERNIGHT EVENTS: Pt has no overnight events. He states that the discomfort in his lip is similar to yesterday and rates it around a 3/10. He denies any fever, chills, headaches, nausea, vomiting, diarrhea, or dysuria. He is eating and sleeping well. He reports having regular bowel movements. He repots tolerating a regular diet. He states that he uses the chlorhexidine after meals on his lip.       REVIEW OF SYSTEMS:  CONSTITUTIONAL: no fever overnight, weight loss, or fatigue  EYES: No eye pain, visual disturbances, or discharge  ENMT:  No difficulty hearing, tinnitus, vertigo; No sinus or throat pain, +swollen lip described as a "discomfort" and "pressure" progressively decreasing in discomfort   NECK: No pain or stiffness  RESPIRATORY: No cough or wheezing; No shortness of breath  CARDIOVASCULAR: No chest pain, palpitations, dizziness, or leg swelling  GASTROINTESTINAL: No abdominal or epigastric pain. No diarrhea or constipation.  GENITOURINARY: No dysuria, frequency, hematuria, or incontinence  NEUROLOGICAL: No headaches, memory loss, loss of strength, numbness, or tremors  SKIN: No itching, burning, rashes, or lesions   MUSCULOSKELETAL: No joint pain or swelling; No muscle, back, or extremity pain  ALLERGY AND IMMUNOLOGIC: No hives or eczema    FAMILY HISTORY:  Mother had history of swollen lip   Father had history of HTN     Vital Signs Last 24 Hrs  T(C): 36.9 (08 Oct 2019 05:18), Max: 36.9 (08 Oct 2019 05:18)  T(F): 98.5 (08 Oct 2019 05:18), Max: 98.5 (08 Oct 2019 05:18)  HR: 100 (08 Oct 2019 05:18) (92 - 100)  BP: 125/86 (08 Oct 2019 05:18) (125/86 - 147/93)  BP(mean): --  RR: 18 (08 Oct 2019 05:18) (18 - 18)  SpO2: 97% (08 Oct 2019 05:18) (97% - 98%)    PHYSICAL EXAM:  GENERAL: NAD, well-groomed, well-developed  HEAD:  Atraumatic, Normocephalic  EYES: EOMI, conjunctiva and sclera clear  ENMT: Moist mucous membranes, +missing upper and lower teeth on the sides, +swollen upper lip mainly on the left spreading past midline that is dark in color with some dried blood in the center of the lesion, similar in swelling to yesterday's exam, lip softer than yesterday, lip more pliable than on previous examinations  NECK: Supple, No JVD  NERVOUS SYSTEM:  Alert & Oriented X3, Good concentration; Motor Strength 5/5 B/L upper and lower extremities  CHEST/LUNG: Clear to percussion bilaterally; No rales, rhonchi, wheezing, or rubs  HEART: Regular rate and rhythm  ABDOMEN: Soft, Nontender, Nondistended; Bowel sounds present  EXTREMITIES:  2+ Peripheral Pulses, No clubbing, cyanosis, or edema  LYMPH: No lymphadenopathy noted  SKIN: No rashes or lesions    Consultant(s) Notes Reviewed:  [x ] YES  [ ] NO  Care Discussed with Consultants/Other Providers [ x] YES  [ ] NO    LABS:                        11.7   11.94 )-----------( 396      ( 07 Oct 2019 08:45 )             37.2     10-    133<L>  |  97  |  17  ----------------------------<  84  4.2   |  24  |  1.30    Ca    9.0      08 Oct 2019 07:05  Phos  2.4     10-08  Mg     2.5     10- 23:10 Urine Cultures: wnl     23:07 Blood Cultures:   - Growth in anaerobic bottle: gram + cocci in pairs  - Growth in aerobic bottle: gram + cocci in clusters  - Gram Stain: MRSA    10/1 01:01 Blood Cultures:  - Growth in anaerobic bottle: gram + cocci in clusters  - Growth in aerobic bottle: gram + cocci in clusters    -MRSA susceptible to vancomycin  - HSV not detected on lesion  - dsDNA, CCP, and anti nuclear factor titer wnl    10/3 13:25 Vanc Trough from 1.25.6    10/3 10:12 Blood Cultures: No growth to date    10/3 16:35 Abscess of Upper Lip Culture: Few Staph Aureus    10/3 16:35 Abscess of Upper Lip Culture: Few MRSA    10/5 4:47 Vanc Trough from 1.5    10/5 9:00 Blood Cultures: no growth    10/7 4:50 Vanc Trough from 1.75g: Vanc tough 20.2    RADIOLOGY & ADDITIONAL TESTS:  EKG  20:57  Ventricular Rate 104 BPM, Atrial Rate 104 BPM, P-R Interval 170 ms, QRS Duration 78 ms, Q-T Interval 340 ms, QTC Calculation(Bezet) 447 ms, P Axis 38 degrees, R Axis 4 degrees, T Axis 21 degrees, Diagnosis Line SINUS TACHYCARDIA, MINIMAL VOLTAGE CRITERIA FOR LVH, MAY BE NORMAL VARIANT, BORDERLINE ECG    CT Maxillofacial w/ IV contrast  21:23  IMPRESSION:    Mild left facial soft tissue stranding, most pronounced the level of the left mandible and extending anterior to masseter muscle, concerning for cellulitis.   No discrete rim-enhancing collection identified to suggest abscess at this time. Dental hardware generates beam hardening artifact which obscures detailed evaluation.  Tiny punctate bubble of air adjacent to the left mandibular periosteum. Consider oral maxillofacial surgical consultation if there is concern for dental infection.    TTE 10/3 10:52:  Conclusions:  1. Normal mitral valve. Minimal mitral regurgitation.  2. Normal trileaflet aortic valve.  3. Normal left ventricular systolic function. No segmental wall motion abnormalities.  4. Normal right ventricular size and function.  ***Unable to exclude endocarditis on this study. Consider GARLAND if clinically indicated.  *** No previous Echo exam.    Imaging Personally Reviewed:  [X] YES  [ ] NO    MEDICATIONS  (STANDING):  amLODIPine   Tablet 10 milliGRAM(s) Oral daily  chlorhexidine 2% Cloths 1 Application(s) Topical daily  cloNIDine 0.1 milliGRAM(s) Oral two times a day  fenofibrate Tablet 145 milliGRAM(s) Oral daily  influenza   Vaccine 0.5 milliLiter(s) IntraMuscular once  sodium chloride 0.9%. 1000 milliLiter(s) (100 mL/Hr) IV Continuous <Continuous>  sodium chloride 0.9%. 1000 milliLiter(s) (100 mL/Hr) IV Continuous <Continuous>  vancomycin  IVPB      vancomycin  IVPB 1250 milliGRAM(s) IV Intermittent every 8 hours    MEDICATIONS  (PRN):  acetaminophen   Tablet .. 650 milliGRAM(s) Oral every 6 hours PRN Temp greater or equal to 38C (100.4F)  traMADol 50 milliGRAM(s) Oral every 6 hours PRN Severe Pain (7 - 10) ALPHONSO MORRISSEY  44y Male    Medicine Team 2  Iona Delatorre MS4  Pager     INTERVAL HPI/OVERNIGHT EVENTS: Pt has no overnight events. He states that the discomfort in his lip is similar to yesterday and rates it around a 3/10. He denies any fever, chills, headaches, nausea, vomiting, diarrhea, or dysuria. He is eating and sleeping well. He reports having regular bowel movements. He repots tolerating a regular diet. He states that he uses the chlorhexidine after meals on his lip.       REVIEW OF SYSTEMS:  CONSTITUTIONAL: no fever overnight, weight loss, or fatigue  EYES: No eye pain, visual disturbances, or discharge  ENMT:  No difficulty hearing, tinnitus, vertigo; No sinus or throat pain, +swollen lip described as a "discomfort" and "pressure" progressively decreasing in discomfort   NECK: No pain or stiffness  RESPIRATORY: No cough or wheezing; No shortness of breath  CARDIOVASCULAR: No chest pain, palpitations, dizziness, or leg swelling  GASTROINTESTINAL: No abdominal or epigastric pain. No diarrhea or constipation.  GENITOURINARY: No dysuria, frequency, hematuria, or incontinence  NEUROLOGICAL: No headaches, memory loss, loss of strength, numbness, or tremors  SKIN: No itching, burning, rashes, or lesions   MUSCULOSKELETAL: No joint pain or swelling; No muscle, back, or extremity pain  ALLERGY AND IMMUNOLOGIC: No hives or eczema    FAMILY HISTORY:  Mother had history of swollen lip   Father had history of HTN     Vital Signs Last 24 Hrs  T(C): 36.9 (08 Oct 2019 05:18), Max: 36.9 (08 Oct 2019 05:18)  T(F): 98.5 (08 Oct 2019 05:18), Max: 98.5 (08 Oct 2019 05:18)  HR: 100 (08 Oct 2019 05:18) (92 - 100)  BP: 125/86 (08 Oct 2019 05:18) (125/86 - 147/93)  BP(mean): --  RR: 18 (08 Oct 2019 05:18) (18 - 18)  SpO2: 97% (08 Oct 2019 05:18) (97% - 98%)    PHYSICAL EXAM:  GENERAL: NAD, well-groomed, well-developed  HEAD:  Atraumatic, Normocephalic  EYES: EOMI, conjunctiva and sclera clear  ENMT: Moist mucous membranes, +missing upper and lower teeth on the sides, +swollen upper lip mainly on the left spreading past midline that is dark in color with some dried blood in the center of the lesion, similar in swelling to yesterday's exam, lip softer than yesterday, lip more pliable than on previous examinations  NECK: Supple, No JVD  NERVOUS SYSTEM:  Alert & Oriented X3, Good concentration; Motor Strength 5/5 B/L upper and lower extremities  CHEST/LUNG: Clear to percussion bilaterally; No rales, rhonchi, wheezing, or rubs  HEART: Regular rate and rhythm  ABDOMEN: Soft, Nontender, Nondistended; Bowel sounds present  EXTREMITIES:  2+ Peripheral Pulses, No clubbing, cyanosis, or edema  LYMPH: No lymphadenopathy noted  SKIN: No rashes or lesions    Consultant(s) Notes Reviewed:  [x ] YES  [ ] NO  Care Discussed with Consultants/Other Providers [ x] YES  [ ] NO    LABS:                        11.4   10.37 )-----------( 453      ( 08 Oct 2019 09:59 )             36.2     10-    133<L>  |  97  |  17  ----------------------------<  84  4.2   |  24  |  1.30    Ca    9.0      08 Oct 2019 07:05  Phos  2.4     10-08  Mg     2.5     10- 23:10 Urine Cultures: wnl     23:07 Blood Cultures:   - Growth in anaerobic bottle: gram + cocci in pairs  - Growth in aerobic bottle: gram + cocci in clusters  - Gram Stain: MRSA    10/1 01:01 Blood Cultures:  - Growth in anaerobic bottle: gram + cocci in clusters  - Growth in aerobic bottle: gram + cocci in clusters    -MRSA susceptible to vancomycin  - HSV not detected on lesion  - dsDNA, CCP, and anti nuclear factor titer wnl    10/3 13:25 Vanc Trough from 1.25.6    10/3 10:12 Blood Cultures: No growth to date at 5 days on 10/8    10/3 16:35 Abscess of Upper Lip Culture: Few Staph Aureus    10/3 16:35 Abscess of Upper Lip Culture: Few MRSA    10/5 4:47 Vanc Trough from 1.5    10/5 9:00 Blood Cultures: no growth    10/7 4:50 Vanc Trough from 1.75g: Vanc tough 20.2    RADIOLOGY & ADDITIONAL TESTS:  EKG  20:57  Ventricular Rate 104 BPM, Atrial Rate 104 BPM, P-R Interval 170 ms, QRS Duration 78 ms, Q-T Interval 340 ms, QTC Calculation(Bezet) 447 ms, P Axis 38 degrees, R Axis 4 degrees, T Axis 21 degrees, Diagnosis Line SINUS TACHYCARDIA, MINIMAL VOLTAGE CRITERIA FOR LVH, MAY BE NORMAL VARIANT, BORDERLINE ECG    CT Maxillofacial w/ IV contrast  21:23  IMPRESSION:    Mild left facial soft tissue stranding, most pronounced the level of the left mandible and extending anterior to masseter muscle, concerning for cellulitis.   No discrete rim-enhancing collection identified to suggest abscess at this time. Dental hardware generates beam hardening artifact which obscures detailed evaluation.  Tiny punctate bubble of air adjacent to the left mandibular periosteum. Consider oral maxillofacial surgical consultation if there is concern for dental infection.    TTE 10/3 10:52:  Conclusions:  1. Normal mitral valve. Minimal mitral regurgitation.  2. Normal trileaflet aortic valve.  3. Normal left ventricular systolic function. No segmental wall motion abnormalities.  4. Normal right ventricular size and function.  ***Unable to exclude endocarditis on this study. Consider GARLAND if clinically indicated.  *** No previous Echo exam.    Imaging Personally Reviewed:  [X] YES  [ ] NO    MEDICATIONS  (STANDING):  amLODIPine   Tablet 10 milliGRAM(s) Oral daily  chlorhexidine 2% Cloths 1 Application(s) Topical daily  cloNIDine 0.1 milliGRAM(s) Oral two times a day  fenofibrate Tablet 145 milliGRAM(s) Oral daily  influenza   Vaccine 0.5 milliLiter(s) IntraMuscular once  sodium chloride 0.9%. 1000 milliLiter(s) (100 mL/Hr) IV Continuous <Continuous>  sodium chloride 0.9%. 1000 milliLiter(s) (100 mL/Hr) IV Continuous <Continuous>  vancomycin  IVPB      vancomycin  IVPB 1250 milliGRAM(s) IV Intermittent every 8 hours    MEDICATIONS  (PRN):  acetaminophen   Tablet .. 650 milliGRAM(s) Oral every 6 hours PRN Temp greater or equal to 38C (100.4F)  traMADol 50 milliGRAM(s) Oral every 6 hours PRN Severe Pain (7 - 10) ALPHONSO MORRISSEY  44y Male    Medicine Team 2  Iona Delatorre MS4  Pager [phone]    INTERVAL HPI/OVERNIGHT EVENTS: Pt has no overnight events. He states that the discomfort in his lip is similar to yesterday and rates it around a 3/10. He denies any fever, chills, headaches, nausea, vomiting, diarrhea, or dysuria. He is eating and sleeping well. He reports having regular bowel movements. He repots tolerating a regular diet. He states that he uses the chlorhexidine after meals on his lip.       REVIEW OF SYSTEMS:  CONSTITUTIONAL: no fever overnight, weight loss, or fatigue  EYES: No eye pain, visual disturbances, or discharge  ENMT:  No difficulty hearing, tinnitus, vertigo; No sinus or throat pain, +swollen lip described as a "discomfort" and "pressure" progressively decreasing in discomfort   NECK: No pain or stiffness  RESPIRATORY: No cough or wheezing; No shortness of breath  CARDIOVASCULAR: No chest pain, palpitations, dizziness, or leg swelling  GASTROINTESTINAL: No abdominal or epigastric pain. No diarrhea or constipation.  GENITOURINARY: No dysuria, frequency, hematuria, or incontinence  NEUROLOGICAL: No headaches, memory loss, loss of strength, numbness, or tremors  SKIN: No itching, burning, rashes, or lesions   MUSCULOSKELETAL: No joint pain or swelling; No muscle, back, or extremity pain  ALLERGY AND IMMUNOLOGIC: No hives or eczema    FAMILY HISTORY:  Mother had history of swollen lip   Father had history of HTN     Vital Signs Last 24 Hrs  T(C): 36.9 (08 Oct 2019 05:18), Max: 36.9 (08 Oct 2019 05:18)  T(F): 98.5 (08 Oct 2019 05:18), Max: 98.5 (08 Oct 2019 05:18)  HR: 100 (08 Oct 2019 05:18) (92 - 100)  BP: 125/86 (08 Oct 2019 05:18) (125/86 - 147/93)  BP(mean): --  RR: 18 (08 Oct 2019 05:18) (18 - 18)  SpO2: 97% (08 Oct 2019 05:18) (97% - 98%)    PHYSICAL EXAM:  GENERAL: NAD, well-groomed, well-developed  HEAD:  Atraumatic, Normocephalic  EYES: EOMI, conjunctiva and sclera clear  ENMT: Moist mucous membranes, +missing upper and lower teeth on the sides, +swollen upper lip mainly on the left spreading past midline that is dark in color with some dried blood in the center of the lesion, similar in swelling to yesterday's exam, lip softer than yesterday, lip more pliable than on previous examinations  NECK: Supple, No JVD  NERVOUS SYSTEM:  Alert & Oriented X3, Good concentration; Motor Strength 5/5 B/L upper and lower extremities  CHEST/LUNG: Clear to percussion bilaterally; No rales, rhonchi, wheezing, or rubs  HEART: Regular rate and rhythm  ABDOMEN: Soft, Nontender, Nondistended; Bowel sounds present  EXTREMITIES:  2+ Peripheral Pulses, No clubbing, cyanosis, or edema  LYMPH: No lymphadenopathy noted  SKIN: No rashes or lesions    Consultant(s) Notes Reviewed:  [x ] YES  [ ] NO  Care Discussed with Consultants/Other Providers [ x] YES  [ ] NO    LABS:                        11.4   10.37 )-----------( 453      ( 08 Oct 2019 09:59 )             36.2     10-08    133<L>  |  97  |  17  ----------------------------<  84  4.2   |  24  |  1.30    Ca    9.0      08 Oct 2019 07:05  Phos  2.4     10-08  Mg     2.5     10-08 9/30 23:10 Urine Cultures: wnl    9/30 23:07 Blood Cultures:   - Growth in anaerobic bottle: gram + cocci in pairs  - Growth in aerobic bottle: gram + cocci in clusters  - Gram Stain: MRSA    10/1 01:01 Blood Cultures:  - Growth in anaerobic bottle: gram + cocci in clusters  - Growth in aerobic bottle: gram + cocci in clusters    -MRSA susceptible to vancomycin  - HSV not detected on lesion  - dsDNA, CCP, and anti nuclear factor titer wnl    10/3 13:25 Vanc Trough from 1.25g q12: 9.6    10/3 10:12 Blood Cultures: No growth to date at 5 days on 10/8    10/3 16:35 Abscess of Upper Lip Culture: Few Staph Aureus    10/3 16:35 Abscess of Upper Lip Culture: Few MRSA    10/5 4:47 Vanc Trough from 1.5g q12: 12    10/5 9:00 Blood Cultures: no growth    10/7 4:50 Vanc Trough from 1.75g q12: Vanc tough 20.2    10/8 13:23 Vanc Trough from 1.25g q8: Vanc trough 22.7    RADIOLOGY & ADDITIONAL TESTS:  EKG 9/30 20:57  Ventricular Rate 104 BPM, Atrial Rate 104 BPM, P-R Interval 170 ms, QRS Duration 78 ms, Q-T Interval 340 ms, QTC Calculation(Bezet) 447 ms, P Axis 38 degrees, R Axis 4 degrees, T Axis 21 degrees, Diagnosis Line SINUS TACHYCARDIA, MINIMAL VOLTAGE CRITERIA FOR LVH, MAY BE NORMAL VARIANT, BORDERLINE ECG    CT Maxillofacial w/ IV contrast 9/30 21:23  IMPRESSION:    Mild left facial soft tissue stranding, most pronounced the level of the left mandible and extending anterior to masseter muscle, concerning for cellulitis.   No discrete rim-enhancing collection identified to suggest abscess at this time. Dental hardware generates beam hardening artifact which obscures detailed evaluation.  Tiny punctate bubble of air adjacent to the left mandibular periosteum. Consider oral maxillofacial surgical consultation if there is concern for dental infection.    TTE 10/3 10:52:  Conclusions:  1. Normal mitral valve. Minimal mitral regurgitation.  2. Normal trileaflet aortic valve.  3. Normal left ventricular systolic function. No segmental wall motion abnormalities.  4. Normal right ventricular size and function.  ***Unable to exclude endocarditis on this study. Consider GARLAND if clinically indicated.  *** No previous Echo exam.    Imaging Personally Reviewed:  [X] YES  [ ] NO    MEDICATIONS  (STANDING):  amLODIPine   Tablet 10 milliGRAM(s) Oral daily  chlorhexidine 2% Cloths 1 Application(s) Topical daily  cloNIDine 0.1 milliGRAM(s) Oral two times a day  fenofibrate Tablet 145 milliGRAM(s) Oral daily  influenza   Vaccine 0.5 milliLiter(s) IntraMuscular once  sodium chloride 0.9%. 1000 milliLiter(s) (100 mL/Hr) IV Continuous <Continuous>  sodium chloride 0.9%. 1000 milliLiter(s) (100 mL/Hr) IV Continuous <Continuous>  vancomycin  IVPB      vancomycin  IVPB 1250 milliGRAM(s) IV Intermittent every 8 hours    MEDICATIONS  (PRN):  acetaminophen   Tablet .. 650 milliGRAM(s) Oral every 6 hours PRN Temp greater or equal to 38C (100.4F)  traMADol 50 milliGRAM(s) Oral every 6 hours PRN Severe Pain (7 - 10)

## 2019-10-08 NOTE — DISCHARGE NOTE NURSING/CASE MANAGEMENT/SOCIAL WORK - NSSCTYPOFSERV_GEN_ALL_CORE
Home Infusion RN will call prior to visit, services to begin 10/9/19  for 2 pm dose Home Infusion RN will call prior to visit, services to begin 10/10/19  for 4 pm dose

## 2019-10-08 NOTE — DISCHARGE NOTE NURSING/CASE MANAGEMENT/SOCIAL WORK - PATIENT PORTAL LINK FT
You can access the FollowMyHealth Patient Portal offered by [facility] by registering at the following website: [email or web address]. By joining [employer]’s FollowMyHealth portal, you will also be able to view your health information using other applications (apps) compatible with our system.

## 2019-10-08 NOTE — PROGRESS NOTE ADULT - ASSESSMENT
43 yo M with PMH of asthma and essential HTN presenting with sepsis 2/2 cellulitis of the upper lip and was found to have MRSA bacteremia, likely from lip abcess with surrounding cellulitis, now s/p I&D, improving.

## 2019-10-08 NOTE — PROGRESS NOTE ADULT - PROBLEM SELECTOR PLAN 2
S/p plastics I&D on 10/3. Abscess culture showed few MRSA.   - 10/7 AM Vanc Trough prior to fourth dose on Vancomycin 1.75g IV Q12 came back as 20.2. Held AM dose. Per ID recs, 1.25g vacn q8. Will check vanc trough prior to 2PM dose today  - per plastics, f/u outpatient end of this week

## 2019-10-08 NOTE — DISCHARGE NOTE NURSING/CASE MANAGEMENT/SOCIAL WORK - NSDCFUADDAPPT_GEN_ALL_CORE_FT
You need to make an appointment with Dr. Dixon, your Plastic Surgeon, for follow up ASAP.  Please call [phone] for an appointment    Please make an appointment with Dr. Man from Infectious Disease on discharge.  [phone] for an appointment.    Please call your PMD, Dr. Zacarias, for an appointment.  Call [phone] for an appointment.

## 2019-10-08 NOTE — PROGRESS NOTE ADULT - PROBLEM SELECTOR PLAN 1
- Now resolved, BCx from 10/5 NGTD  - 10/7 AM Vanc Trough prior to fourth dose on Vancomycin 1.75g IV Q12 came back as 20.2. Held AM dose. Per ID recs, 1.25g vanc q8. Will check vanc trough prior to 2PM dose today  - per ID, hold off on GARLAND for now, check blood cultures over weekend to ensure they remain negative, will ultimately need PICC line for 4 weeks abx, stop date 11/2  - Blood cultures negative over weekend  - f/u PICC placement today

## 2019-10-08 NOTE — PROGRESS NOTE ADULT - PROBLEM SELECTOR PLAN 3
Pt with SBP 140s currently, lisinopril dc'ed at admission for concern for angioedema.  - Clonidine 0.1 mg PO BID up from qd at home; pt tolerating well  - Amlodipine 10 mg QD, also up from home dose.

## 2019-10-09 LAB
ANION GAP SERPL CALC-SCNC: 12 MMOL/L — SIGNIFICANT CHANGE UP (ref 5–17)
BASOPHILS # BLD AUTO: 0.05 K/UL — SIGNIFICANT CHANGE UP (ref 0–0.2)
BASOPHILS NFR BLD AUTO: 0.5 % — SIGNIFICANT CHANGE UP (ref 0–2)
BUN SERPL-MCNC: 19 MG/DL — SIGNIFICANT CHANGE UP (ref 7–23)
CALCIUM SERPL-MCNC: 8.9 MG/DL — SIGNIFICANT CHANGE UP (ref 8.4–10.5)
CHLORIDE SERPL-SCNC: 99 MMOL/L — SIGNIFICANT CHANGE UP (ref 96–108)
CO2 SERPL-SCNC: 24 MMOL/L — SIGNIFICANT CHANGE UP (ref 22–31)
CREAT SERPL-MCNC: 1.17 MG/DL — SIGNIFICANT CHANGE UP (ref 0.5–1.3)
EOSINOPHIL # BLD AUTO: 0.1 K/UL — SIGNIFICANT CHANGE UP (ref 0–0.5)
EOSINOPHIL NFR BLD AUTO: 0.9 % — SIGNIFICANT CHANGE UP (ref 0–6)
GLUCOSE SERPL-MCNC: 85 MG/DL — SIGNIFICANT CHANGE UP (ref 70–99)
HCT VFR BLD CALC: 35.3 % — LOW (ref 39–50)
HGB BLD-MCNC: 10.8 G/DL — LOW (ref 13–17)
IMM GRANULOCYTES NFR BLD AUTO: 3.7 % — HIGH (ref 0–1.5)
LYMPHOCYTES # BLD AUTO: 2.71 K/UL — SIGNIFICANT CHANGE UP (ref 1–3.3)
LYMPHOCYTES # BLD AUTO: 24.6 % — SIGNIFICANT CHANGE UP (ref 13–44)
MAGNESIUM SERPL-MCNC: 2.4 MG/DL — SIGNIFICANT CHANGE UP (ref 1.6–2.6)
MCHC RBC-ENTMCNC: 18.9 PG — LOW (ref 27–34)
MCHC RBC-ENTMCNC: 30.6 GM/DL — LOW (ref 32–36)
MCV RBC AUTO: 61.8 FL — LOW (ref 80–100)
MONOCYTES # BLD AUTO: 0.8 K/UL — SIGNIFICANT CHANGE UP (ref 0–0.9)
MONOCYTES NFR BLD AUTO: 7.3 % — SIGNIFICANT CHANGE UP (ref 2–14)
NEUTROPHILS # BLD AUTO: 6.95 K/UL — SIGNIFICANT CHANGE UP (ref 1.8–7.4)
NEUTROPHILS NFR BLD AUTO: 63 % — SIGNIFICANT CHANGE UP (ref 43–77)
PHOSPHATE SERPL-MCNC: 2.3 MG/DL — LOW (ref 2.5–4.5)
PLATELET # BLD AUTO: 501 K/UL — HIGH (ref 150–400)
POTASSIUM SERPL-MCNC: 4.2 MMOL/L — SIGNIFICANT CHANGE UP (ref 3.5–5.3)
POTASSIUM SERPL-SCNC: 4.2 MMOL/L — SIGNIFICANT CHANGE UP (ref 3.5–5.3)
RBC # BLD: 5.71 M/UL — SIGNIFICANT CHANGE UP (ref 4.2–5.8)
RBC # FLD: 15.2 % — HIGH (ref 10.3–14.5)
SODIUM SERPL-SCNC: 135 MMOL/L — SIGNIFICANT CHANGE UP (ref 135–145)
VANCOMYCIN FLD-MCNC: 7.6 UG/ML — SIGNIFICANT CHANGE UP
WBC # BLD: 11.02 K/UL — HIGH (ref 3.8–10.5)
WBC # FLD AUTO: 11.02 K/UL — HIGH (ref 3.8–10.5)

## 2019-10-09 PROCEDURE — 99232 SBSQ HOSP IP/OBS MODERATE 35: CPT

## 2019-10-09 RX ORDER — VANCOMYCIN HCL 1 G
1000 VIAL (EA) INTRAVENOUS EVERY 8 HOURS
Refills: 0 | Status: DISCONTINUED | OUTPATIENT
Start: 2019-10-09 | End: 2019-10-09

## 2019-10-09 RX ORDER — VANCOMYCIN HCL 1 G
1000 VIAL (EA) INTRAVENOUS EVERY 8 HOURS
Refills: 0 | Status: DISCONTINUED | OUTPATIENT
Start: 2019-10-09 | End: 2019-10-10

## 2019-10-09 RX ORDER — VANCOMYCIN HCL 1 G
VIAL (EA) INTRAVENOUS
Refills: 0 | Status: DISCONTINUED | OUTPATIENT
Start: 2019-10-09 | End: 2019-10-09

## 2019-10-09 RX ORDER — VANCOMYCIN HCL 1 G
1000 VIAL (EA) INTRAVENOUS ONCE
Refills: 0 | Status: COMPLETED | OUTPATIENT
Start: 2019-10-09 | End: 2019-10-09

## 2019-10-09 RX ADMIN — Medication 250 MILLIGRAM(S): at 10:44

## 2019-10-09 RX ADMIN — CHLORHEXIDINE GLUCONATE 1 APPLICATION(S): 213 SOLUTION TOPICAL at 06:09

## 2019-10-09 RX ADMIN — Medication 145 MILLIGRAM(S): at 12:22

## 2019-10-09 RX ADMIN — Medication 250 MILLIGRAM(S): at 18:06

## 2019-10-09 RX ADMIN — Medication 0.1 MILLIGRAM(S): at 17:42

## 2019-10-09 RX ADMIN — AMLODIPINE BESYLATE 10 MILLIGRAM(S): 2.5 TABLET ORAL at 06:08

## 2019-10-09 RX ADMIN — Medication 0.1 MILLIGRAM(S): at 06:08

## 2019-10-09 NOTE — PROGRESS NOTE ADULT - PROBLEM SELECTOR PLAN 1
- Now resolved, BCx from 10/5 NGTD  - PICC placed 10/8 and confirmed in correct position  - Vanc level prior to 4th dose of 1.25g q8 10/8 was 22.7, held 10/8 PM vanc  - 10/9 AM vanc level 7.6  - Per ID recs, beginning 1g vanc q8 10/9 AM

## 2019-10-09 NOTE — PROGRESS NOTE ADULT - PROBLEM SELECTOR PLAN 2
S/p plastics I&D on 10/3. Abscess culture showed few MRSA.   - Per ID recs, beginning 1g vanc q8 10/9 AM  - per plastics, f/u outpatient end of this week

## 2019-10-09 NOTE — PROGRESS NOTE ADULT - PROBLEM SELECTOR PLAN 5
DVT: Improve score 0, ambulate   Diet: DASH diet  Dispo: Home pending improvement and resolution of bacteremia
DVT: Improve score 0, ambulate   Diet: full liquid diet  Dispo: Home pending improvement and resolution of bacteremia
-better  -cont abx  -trend cbc

## 2019-10-09 NOTE — PROGRESS NOTE ADULT - GASTROINTESTINAL DETAILS
no distention/no rigidity/no rebound tenderness/soft/no guarding/nontender
no rebound tenderness/soft/nontender/no distention/no guarding/no rigidity
no rebound tenderness/no rigidity/no distention/no guarding/soft/nontender
no rebound tenderness/no rigidity/soft/no distention/nontender/no guarding
nontender/no rigidity/soft/no rebound tenderness/no guarding/no distention

## 2019-10-09 NOTE — PROGRESS NOTE ADULT - MS EXT PE MLT D E PC
no cyanosis/no pedal edema
no pedal edema/no cyanosis
no cyanosis/no pedal edema
no cyanosis/no pedal edema
no pedal edema/no cyanosis

## 2019-10-09 NOTE — PROGRESS NOTE ADULT - ATTENDING COMMENTS
Leukocytosis 2/2 abscess infection.   Hyponatremia- check serum osm and urine sodium  MRSA bacteremia with lip abscess- continue IV Vancomycin as above and will get PICC line on Monday. Tramadol for pain  HTN- Amlodipine and Clonidine- well controlled
Seen, examined the patient with house staff  - Lot better with less swelling and redness of upper lip, s/p I & D, afebrile, no c/o dysphagia.    Reviewed labs, imaging  - ID- f/u plan noted, c/w IV vancomycin 1gm q 8 hrs for now, will do trough level on 4th dose and d/c after that tomorrow    PICC line placed,   - outpatient ID and plastic Sx f/u
seen, examined the patient with house staff this am  - Resting in bed, afebrile, T max 101, c/o discomfort of upper lip with redness and erythema    Reviewed labs, imaging  - appreciated Dental and ID consults    Cellulitis w sepsis of upper lip probably from bacterial infection (  started at the corner-left side, doubt angioedema  - c/w IV Vancomycin, Acyclovir, f/u c/s  - Hold ACE I
Seen, examined the patient with house staff  - Afebrile, sitting in bed, no c/o dysphagia. improving swelling and redness of upper lip     Reviewed labs, imaging  - Team spoke to ID- hold IV vanc for now, will do trough level in am prior to next am dose    PICC line today. D/C planning in progress with home care.  - outpatient ID and plastic Sx f/u .
Seen, examined the patient with house staff   - Febrile 100.9F, Resting in bed, afebrile, upper lip got worse w redness and swelling, no discharge    Reviewed labs, imaging  - Septic with MRSA bacteremia from possibly left corner mouth, on IV Vancomycin, acyclovir    f/u repeat blood c/s. Echo  - ID f/u plan  - asked Dental f/u for possible maxillofacial surgery or Plastic sx  - Hold ACE I.
Seen, examined the patient with house staff   - Febrile 100.5F, sitting in bed, s/p I & D by Plastic sx this am of upper lip    Reviewed labs- repeat blood c/s positive MRSA  - Septic with MRSA bacteremia from possibly left corner mouth, on IV Vancomycin    f/u repeat blood c/s. Echo  - ID f/u plan noted. Need IV Vanco  - Appreciated Dental f/u and Plastic Sx consult  - Hold ACE I.
Seen, examined the patient with house staff  - Sitting in chair, afebrile, no c/o dysphagia. swelling and redness of upper lip went down    Reviewed labs, imaging  - spoke to ID- will c/w IV vancomycin 1.25gm q 8 hrs fro 4 weeks from last neg blood c/s    PICC ordered.  - d/c planning w home care in progress   - outpatient ID and plastic Sx f/u
Cirilo Man  Attending Physician   Division of Infectious Disease  Pager #[phone]  After 5pm/weekend or no response, call #[phone]
Cirilo Man  Attending Physician   Division of Infectious Disease  Pager #[phone]  After 5pm/weekend or no response, call #[phone]
Cirilo Man  Attending Physician   Division of Infectious Disease  Pager #[phone]  After 5pm/weekend or no response, call #[phone]
Cirilo Man  Attending Physician   Division of Infectious Disease  Pager #[phone]  After 5pm/weekend or no response, call #[phone]    I am away and will return 10/9. ID to cover #[phone].
Cirilo Man  Attending Physician   Division of Infectious Disease  Pager #[phone]  After 5pm/weekend or no response, call #[phone]

## 2019-10-09 NOTE — PROGRESS NOTE ADULT - SUBJECTIVE AND OBJECTIVE BOX
ALPHONSO MORRISSEY 44y [id number]    Patient is a 44y old  Male who presents with a chief complaint of Cellulitis (09 Oct 2019 09:23)      Follow Up/CC:  ID following for MRSA    Interval History/ROS: feels well, waiting for DC, no fever    Allergies    No Known Allergies    Intolerances        ANTIMICROBIALS:  vancomycin  IVPB 1000 every 8 hours  vancomycin  IVPB        MEDICATIONS  (STANDING):  amLODIPine   Tablet 10 milliGRAM(s) Oral daily  chlorhexidine 2% Cloths 1 Application(s) Topical daily  cloNIDine 0.1 milliGRAM(s) Oral two times a day  fenofibrate Tablet 145 milliGRAM(s) Oral daily  influenza   Vaccine 0.5 milliLiter(s) IntraMuscular once  vancomycin  IVPB 1000 milliGRAM(s) IV Intermittent every 8 hours  vancomycin  IVPB        MEDICATIONS  (PRN):  acetaminophen   Tablet .. 650 milliGRAM(s) Oral every 6 hours PRN Temp greater or equal to 38C (100.4F)        Vital Signs Last 24 Hrs  T(C): 36.6 (09 Oct 2019 11:00), Max: 36.7 (08 Oct 2019 17:29)  T(F): 97.8 (09 Oct 2019 11:00), Max: 98.1 (08 Oct 2019 17:29)  HR: 81 (09 Oct 2019 11:00) (79 - 89)  BP: 130/85 (09 Oct 2019 11:00) (120/81 - 145/90)  BP(mean): --  RR: 18 (09 Oct 2019 11:00) (18 - 18)  SpO2: 98% (09 Oct 2019 11:00) (97% - 99%)    CBC Full  -  ( 09 Oct 2019 09:42 )  WBC Count : 11.02 K/uL  RBC Count : 5.71 M/uL  Hemoglobin : 10.8 g/dL  Hematocrit : 35.3 %  Platelet Count - Automated : 501 K/uL  Mean Cell Volume : 61.8 fl  Mean Cell Hemoglobin : 18.9 pg  Mean Cell Hemoglobin Concentration : 30.6 gm/dL  Auto Neutrophil # : x  Auto Lymphocyte # : x  Auto Monocyte # : x  Auto Eosinophil # : x  Auto Basophil # : x  Auto Neutrophil % : x  Auto Lymphocyte % : x  Auto Monocyte % : x  Auto Eosinophil % : x  Auto Basophil % : x    10-09    135  |  99  |  19  ----------------------------<  85  4.2   |  24  |  1.17    Ca    8.9      09 Oct 2019 06:58  Phos  2.3     10-09  Mg     2.4     10-09            MICROBIOLOGY:  .Blood  10-05-19   No growth to date.  --  --      .Abscess Upper lip  10-03-19   Few Methicillin resistant Staphylococcus aureus  --  Methicillin resistant Staphylococcus aureus      .Blood  10-03-19   No growth at 5 days.  --  --      .Blood  10-01-19   Growth in aerobic and anaerobic bottles: Methicillin resistant  Staphylococcus aureus  See previous culture [id number]  --    Growth in aerobic bottle: Gram Positive Cocci in Clusters  Growth in anaerobic bottle: Gram Positive Cocci in Clusters      .Urine  09-30-19   <10,000 CFU/mL Normal Urogenital [place]  --  --      .Blood  09-30-19   Growth in aerobic and anaerobic bottles: Methicillin resistant  Staphylococcus aureus  "Due to technical problems, Proteus sp. will Not be reported as part of  the BCID panel until further notice"  ***Blood Panel PCR results on this specimen are available  approximately 3 hours after the Gram stain result.***  Gram stain, PCR, and/or culture results may not always  correspond due to difference in methodologies.  ************************************************************  This PCR assay was performed using [employer].  The following targets are tested for: Enterococcus,  vancomycin resistant enterococci, Listeria monocytogenes,  coagulase negative staphylococci, S. aureus,  methicillin resistant S. aureus, Streptococcus agalactiae  (Group B), S. pneumoniae, S. pyogenes (Group A),  Acinetobacter baumannii, Enterobacter cloacae, E. coli,  Klebsiella oxytoca, K. pneumoniae, Proteus sp.,  Serratia marcescens, Haemophilus influenzae,  Neisseria meningitidis, Pseudomonas aeruginosa, Candida  albicans, C. glabrata, C krusei, C parapsilosis,  C. tropicalis and the KPC resistance gene.  --  Blood Culture PCR  Methicillin resistant Staphylococcus aureus    Vancomycin Level, Random: 7.6 ug/mL (10-09-19 @ 06:58)  Vancomycin Level, Trough: 22.7 ug/mL (10-08-19 @ 13:23)      RADIOLOGY    < from: Xray Chest 1 View- PORTABLE-Urgent (10.08.19 @ 19:54) >  Right-sided PICC line with tip in the SVC.    Right perihilar opacity. Recommend CT chest further evaluation.    < end of copied text >

## 2019-10-09 NOTE — PROGRESS NOTE ADULT - PROBLEM SELECTOR PLAN 6
-monitor vanco trough and creatinine
-monitor vanco trough and creatinine  -keep trough 15-20  -weekly cbc, vanco trough, BUN/creatinine - fax [phone]
-monitor vanco trough and creatinine  -keep trough 15-20  -weekly cbc, vanco trough, BUN/creatinine - fax [phone]
-monitor vanco trough and creatinine
-monitor vanco trough and creatinine

## 2019-10-09 NOTE — PROGRESS NOTE ADULT - NEUROLOGICAL DETAILS
alert and oriented x 3/normal strength/responds to verbal commands
alert and oriented x 3/responds to verbal commands/normal strength
normal strength/responds to verbal commands/alert and oriented x 3
alert and oriented x 3/normal strength/responds to verbal commands
alert and oriented x 3/responds to verbal commands/normal strength

## 2019-10-09 NOTE — PROGRESS NOTE ADULT - PROBLEM SELECTOR PROBLEM 6
Medication monitoring encounter

## 2019-10-09 NOTE — PROGRESS NOTE ADULT - CARDIOVASCULAR DETAILS
positive S1/positive S2
positive S1/positive S2
positive S2/positive S1

## 2019-10-09 NOTE — PROGRESS NOTE ADULT - PROBLEM SELECTOR PLAN 7
-from lip  -check repeat bcx   -check TTE  -cont vancomycin - keep trough 15-20
-from lip  -repeat bcx negative so far  -TTE ok - no major valvular issues   -cont vancomycin - keep trough 15-20  -will need picc + 4 weeks iv abx  -potential side effects of PICC explained including bleeding and infection  -potential side effects of abx explained including GI, Cdiff, allergy issues, development of resistance, etc.
-from lip  -repeat bcx negative so far  -TTE ok - no major valvular issues   -cont vancomycin - keep trough 15-20  -will need picc + 4 weeks iv abx  -potential side effects of PICC explained including bleeding and infection  -potential side effects of abx explained including GI, Cdiff, allergy issues, development of resistance, etc.  -f/u in ID office in 4 weeks [phone]
-from lip  -repeat bcx negative so far  -TTE ok - no major valvular issues   -cont vancomycin - keep trough 15-20  -will need picc + 4 weeks iv abx  -potential side effects of PICC explained including bleeding and infection  -potential side effects of abx explained including GI, Cdiff, allergy issues, development of resistance, etc.
-new problem   -from lip  -check repeat bcx   -check TTE  -cont vancomycin - keep trough 15-20

## 2019-10-09 NOTE — PROGRESS NOTE ADULT - SUBJECTIVE AND OBJECTIVE BOX
ALPHONSO MORRISSEY  44y Male    Medicine Team 2  Iona Delatorre MS4  Pager [phone]    INTERVAL HPI/OVERNIGHT EVENTS: Pt has no overnight events. He states that the discomfort in his lip is progressively decreasing. He is eating and sleeping well. He is able to tolerate a regular diet and reports brushing his teeth after every meal. He reports having regular bowel movements. He denies any fever, chills, headaches, nausea, vomiting, diarrhea, or dysuria.     REVIEW OF SYSTEMS:  CONSTITUTIONAL: no fever overnight, weight loss, or fatigue  EYES: No eye pain, visual disturbances, or discharge  ENMT:  No difficulty hearing, tinnitus, vertigo; No sinus or throat pain, +swollen lip described as a "discomfort" and "pressure" progressively decreasing in discomfort   NECK: No pain or stiffness  RESPIRATORY: No cough or wheezing; No shortness of breath  CARDIOVASCULAR: No chest pain, palpitations, dizziness, or leg swelling  GASTROINTESTINAL: No abdominal or epigastric pain. No diarrhea or constipation.  GENITOURINARY: No dysuria, frequency, hematuria, or incontinence  NEUROLOGICAL: No headaches, memory loss, loss of strength, numbness, or tremors  SKIN: No itching, burning, rashes, or lesions   MUSCULOSKELETAL: No joint pain or swelling; No muscle, back, or extremity pain; +PICC line in right arm w/o surrounding erythema  ALLERGY AND IMMUNOLOGIC: No hives or eczema    FAMILY HISTORY:  Mother had history of swollen lip   Father had history of HTN     Vital Signs Last 24 Hrs  T(C): 36.7 (09 Oct 2019 06:05), Max: 36.7 (08 Oct 2019 17:29)  T(F): 98 (09 Oct 2019 06:05), Max: 98.1 (08 Oct 2019 17:29)  HR: 80 (09 Oct 2019 06:05) (79 - 89)  BP: 125/84 (09 Oct 2019 06:05) (120/81 - 145/90)  BP(mean): --  RR: 18 (09 Oct 2019 06:05) (18 - 18)  SpO2: 97% (09 Oct 2019 06:05) (97% - 99%)      PHYSICAL EXAM:  GENERAL: NAD, well-groomed, well-developed  HEAD:  Atraumatic, Normocephalic  EYES: EOMI, conjunctiva and sclera clear  ENMT: Moist mucous membranes, +missing upper and lower teeth on the sides, +swollen upper lip mainly on the left spreading slightly past midline that is dark in color with some dark dried blood in the center of the lesion, slightly decreased in swelling as compared to yesterday's exam, lip softer than yesterday, lip more pliable than on previous examinations  NECK: Supple, No JVD  NERVOUS SYSTEM:  Alert & Oriented X3, Good concentration; Motor Strength 5/5 B/L upper and lower extremities  CHEST/LUNG: Clear to percussion bilaterally; No rales, rhonchi, wheezing, or rubs  HEART: Regular rate and rhythm  ABDOMEN: Soft, Nontender, Nondistended; Bowel sounds present  EXTREMITIES:  2+ Peripheral Pulses, No clubbing, cyanosis, or edema  LYMPH: No lymphadenopathy noted  SKIN: No rashes or lesions    Consultant(s) Notes Reviewed:  [x ] YES  [ ] NO  Care Discussed with Consultants/Other Providers [ x] YES  [ ] NO      LABS:                        11.4   10.37 )-----------( 453      ( 08 Oct 2019 09:59 )             36.2     10-09    135  |  99  |  19  ----------------------------<  85  4.2   |  24  |  1.17    Ca    8.9      09 Oct 2019 06:58  Phos  2.3     10-09  Mg     2.4     10-09        9/30 23:10 Urine Cultures: wnl    9/30 23:07 Blood Cultures:   - Growth in anaerobic bottle: gram + cocci in pairs  - Growth in aerobic bottle: gram + cocci in clusters  - Gram Stain: MRSA    10/1 01:01 Blood Cultures:  - Growth in anaerobic bottle: gram + cocci in clusters  - Growth in aerobic bottle: gram + cocci in clusters    -MRSA susceptible to vancomycin  - HSV not detected on lesion  - dsDNA, CCP, and anti nuclear factor titer wnl    10/3 13:25 Vanc Trough from 1.25g q12: 9.6    10/3 10:12 Blood Cultures: No growth to date at 5 days on 10/8    10/3 16:35 Abscess of Upper Lip Culture: Few Staph Aureus    10/3 16:35 Abscess of Upper Lip Culture: Few MRSA    10/5 4:47 Vanc Trough from 1.5g q12: 12    10/5 9:00 Blood Cultures: no growth    10/7 4:50 Vanc Trough from 1.75g q12: Vanc tough 20.2    10/8 13:23 Vanc Trough from 1.25g q8: Vanc trough 22.7    RADIOLOGY & ADDITIONAL TESTS:  EKG 9/30 20:57  Ventricular Rate 104 BPM, Atrial Rate 104 BPM, P-R Interval 170 ms, QRS Duration 78 ms, Q-T Interval 340 ms, QTC Calculation(Bezet) 447 ms, P Axis 38 degrees, R Axis 4 degrees, T Axis 21 degrees, Diagnosis Line SINUS TACHYCARDIA, MINIMAL VOLTAGE CRITERIA FOR LVH, MAY BE NORMAL VARIANT, BORDERLINE ECG    CT Maxillofacial w/ IV contrast 9/30 21:23  IMPRESSION:    Mild left facial soft tissue stranding, most pronounced the level of the left mandible and extending anterior to masseter muscle, concerning for cellulitis.   No discrete rim-enhancing collection identified to suggest abscess at this time. Dental hardware generates beam hardening artifact which obscures detailed evaluation.  Tiny punctate bubble of air adjacent to the left mandibular periosteum. Consider oral maxillofacial surgical consultation if there is concern for dental infection.    TTE 10/3 10:52:  Conclusions:  1. Normal mitral valve. Minimal mitral regurgitation.  2. Normal trileaflet aortic valve.  3. Normal left ventricular systolic function. No segmental wall motion abnormalities.  4. Normal right ventricular size and function.  ***Unable to exclude endocarditis on this study. Consider GARLAND if clinically indicated.  *** No previous Echo exam.    CXR 10/8 19:54 Preliminary Read Interpretation: right sided PICC with the tip in the SVC    Imaging Personally Reviewed:  [X] YES  [ ] NO    MEDICATIONS  (STANDING):  amLODIPine   Tablet 10 milliGRAM(s) Oral daily  chlorhexidine 2% Cloths 1 Application(s) Topical daily  cloNIDine 0.1 milliGRAM(s) Oral two times a day  fenofibrate Tablet 145 milliGRAM(s) Oral daily  influenza   Vaccine 0.5 milliLiter(s) IntraMuscular once    MEDICATIONS  (PRN):  acetaminophen   Tablet .. 650 milliGRAM(s) Oral every 6 hours PRN Temp greater or equal to 38C (100.4F) ALPHONSO MORRISSEY  44y Male    Medicine Team 2  Iona Delatorre MS4  Pager [phone]    INTERVAL HPI/OVERNIGHT EVENTS: Pt has no overnight events. He states that the discomfort in his lip is progressively decreasing. He is eating and sleeping well. He is able to tolerate a regular diet and reports brushing his teeth after every meal. He reports having regular bowel movements. He denies any fever, chills, headaches, nausea, vomiting, diarrhea, or dysuria.     REVIEW OF SYSTEMS:  CONSTITUTIONAL: no fever overnight, weight loss, or fatigue  EYES: No eye pain, visual disturbances, or discharge  ENMT:  No difficulty hearing, tinnitus, vertigo; No sinus or throat pain, +swollen lip described as a "discomfort" and "pressure" progressively decreasing in discomfort   NECK: No pain or stiffness  RESPIRATORY: No cough or wheezing; No shortness of breath  CARDIOVASCULAR: No chest pain, palpitations, dizziness, or leg swelling  GASTROINTESTINAL: No abdominal or epigastric pain. No diarrhea or constipation.  GENITOURINARY: No dysuria, frequency, hematuria, or incontinence  NEUROLOGICAL: No headaches, memory loss, loss of strength, numbness, or tremors  SKIN: No itching, burning, rashes, or lesions   MUSCULOSKELETAL: No joint pain or swelling; No muscle, back, or extremity pain; +PICC line in right arm w/o surrounding erythema  ALLERGY AND IMMUNOLOGIC: No hives or eczema    FAMILY HISTORY:  Mother had history of swollen lip   Father had history of HTN     Vital Signs Last 24 Hrs  T(C): 36.7 (09 Oct 2019 06:05), Max: 36.7 (08 Oct 2019 17:29)  T(F): 98 (09 Oct 2019 06:05), Max: 98.1 (08 Oct 2019 17:29)  HR: 80 (09 Oct 2019 06:05) (79 - 89)  BP: 125/84 (09 Oct 2019 06:05) (120/81 - 145/90)  BP(mean): --  RR: 18 (09 Oct 2019 06:05) (18 - 18)  SpO2: 97% (09 Oct 2019 06:05) (97% - 99%)      PHYSICAL EXAM:  GENERAL: NAD, well-groomed, well-developed  HEAD:  Atraumatic, Normocephalic  EYES: EOMI, conjunctiva and sclera clear  ENMT: Moist mucous membranes, +missing upper and lower teeth on the sides, +swollen upper lip mainly on the left spreading slightly past midline that is dark in color with some dark dried blood in the center of the lesion, slightly decreased in swelling as compared to yesterday's exam, lip softer than yesterday, lip more pliable than on previous examinations  NECK: Supple, No JVD  NERVOUS SYSTEM:  Alert & Oriented X3, Good concentration; Motor Strength 5/5 B/L upper and lower extremities  CHEST/LUNG: Clear to percussion bilaterally; No rales, rhonchi, wheezing, or rubs  HEART: Regular rate and rhythm  ABDOMEN: Soft, Nontender, Nondistended; Bowel sounds present  EXTREMITIES:  2+ Peripheral Pulses, No clubbing, cyanosis, or edema  LYMPH: No lymphadenopathy noted  SKIN: No rashes or lesions    Consultant(s) Notes Reviewed:  [x ] YES  [ ] NO  Care Discussed with Consultants/Other Providers [ x] YES  [ ] NO      LABS:                        10.8   11.02 )-----------( 501      ( 09 Oct 2019 09:42 )             35.3     10-09    135  |  99  |  19  ----------------------------<  85  4.2   |  24  |  1.17    Ca    8.9      09 Oct 2019 06:58  Phos  2.3     10-09  Mg     2.4     10-09      9/30 23:10 Urine Cultures: wnl    9/30 23:07 Blood Cultures:   - Growth in anaerobic bottle: gram + cocci in pairs  - Growth in aerobic bottle: gram + cocci in clusters  - Gram Stain: MRSA    10/1 01:01 Blood Cultures:  - Growth in anaerobic bottle: gram + cocci in clusters  - Growth in aerobic bottle: gram + cocci in clusters    -MRSA susceptible to vancomycin  - HSV not detected on lesion  - dsDNA, CCP, and anti nuclear factor titer wnl    10/3 13:25 Vanc Trough from 1.25g q12: 9.6    10/3 10:12 Blood Cultures: No growth to date at 5 days on 10/8    10/3 16:35 Abscess of Upper Lip Culture: Few Staph Aureus    10/3 16:35 Abscess of Upper Lip Culture: Few MRSA    10/5 4:47 Vanc Trough from 1.5g q12: 12    10/5 9:00 Blood Cultures: no growth    10/7 4:50 Vanc Trough from 1.75g q12: Vanc tough 20.2    10/8 13:23 Vanc Trough from 1.25g q8: Vanc trough 22.7    RADIOLOGY & ADDITIONAL TESTS:  EKG 9/30 20:57  Ventricular Rate 104 BPM, Atrial Rate 104 BPM, P-R Interval 170 ms, QRS Duration 78 ms, Q-T Interval 340 ms, QTC Calculation(Bezet) 447 ms, P Axis 38 degrees, R Axis 4 degrees, T Axis 21 degrees, Diagnosis Line SINUS TACHYCARDIA, MINIMAL VOLTAGE CRITERIA FOR LVH, MAY BE NORMAL VARIANT, BORDERLINE ECG    CT Maxillofacial w/ IV contrast 9/30 21:23  IMPRESSION:    Mild left facial soft tissue stranding, most pronounced the level of the left mandible and extending anterior to masseter muscle, concerning for cellulitis.   No discrete rim-enhancing collection identified to suggest abscess at this time. Dental hardware generates beam hardening artifact which obscures detailed evaluation.  Tiny punctate bubble of air adjacent to the left mandibular periosteum. Consider oral maxillofacial surgical consultation if there is concern for dental infection.    TTE 10/3 10:52:  Conclusions:  1. Normal mitral valve. Minimal mitral regurgitation.  2. Normal trileaflet aortic valve.  3. Normal left ventricular systolic function. No segmental wall motion abnormalities.  4. Normal right ventricular size and function.  ***Unable to exclude endocarditis on this study. Consider GARLAND if clinically indicated.  *** No previous Echo exam.    CXR 10/8 19:54 Preliminary Read Interpretation: right sided PICC with the tip in the SVC    Imaging Personally Reviewed:  [X] YES  [ ] NO    MEDICATIONS  (STANDING):  amLODIPine   Tablet 10 milliGRAM(s) Oral daily  chlorhexidine 2% Cloths 1 Application(s) Topical daily  cloNIDine 0.1 milliGRAM(s) Oral two times a day  fenofibrate Tablet 145 milliGRAM(s) Oral daily  influenza   Vaccine 0.5 milliLiter(s) IntraMuscular once    MEDICATIONS  (PRN):  acetaminophen   Tablet .. 650 milliGRAM(s) Oral every 6 hours PRN Temp greater or equal to 38C (100.4F)

## 2019-10-09 NOTE — PROGRESS NOTE ADULT - RS GEN PE MLT RESP DETAILS PC
clear to auscultation bilaterally/respirations non-labored
respirations non-labored/clear to auscultation bilaterally
clear to auscultation bilaterally/respirations non-labored
no chest wall tenderness/clear to auscultation bilaterally
respirations non-labored/clear to auscultation bilaterally

## 2019-10-10 VITALS
TEMPERATURE: 98 F | SYSTOLIC BLOOD PRESSURE: 138 MMHG | OXYGEN SATURATION: 98 % | DIASTOLIC BLOOD PRESSURE: 97 MMHG | RESPIRATION RATE: 18 BRPM | HEART RATE: 90 BPM

## 2019-10-10 DIAGNOSIS — I10 ESSENTIAL (PRIMARY) HYPERTENSION: ICD-10-CM

## 2019-10-10 LAB
ANION GAP SERPL CALC-SCNC: 10 MMOL/L — SIGNIFICANT CHANGE UP (ref 5–17)
BUN SERPL-MCNC: 18 MG/DL — SIGNIFICANT CHANGE UP (ref 7–23)
CALCIUM SERPL-MCNC: 8.8 MG/DL — SIGNIFICANT CHANGE UP (ref 8.4–10.5)
CHLORIDE SERPL-SCNC: 98 MMOL/L — SIGNIFICANT CHANGE UP (ref 96–108)
CO2 SERPL-SCNC: 24 MMOL/L — SIGNIFICANT CHANGE UP (ref 22–31)
CREAT SERPL-MCNC: 1.19 MG/DL — SIGNIFICANT CHANGE UP (ref 0.5–1.3)
CULTURE RESULTS: SIGNIFICANT CHANGE UP
CULTURE RESULTS: SIGNIFICANT CHANGE UP
GLUCOSE SERPL-MCNC: 86 MG/DL — SIGNIFICANT CHANGE UP (ref 70–99)
HCT VFR BLD CALC: 34.4 % — LOW (ref 39–50)
HGB BLD-MCNC: 10.7 G/DL — LOW (ref 13–17)
MAGNESIUM SERPL-MCNC: 2.3 MG/DL — SIGNIFICANT CHANGE UP (ref 1.6–2.6)
MCHC RBC-ENTMCNC: 19.1 PG — LOW (ref 27–34)
MCHC RBC-ENTMCNC: 31.1 GM/DL — LOW (ref 32–36)
MCV RBC AUTO: 61.3 FL — LOW (ref 80–100)
PHOSPHATE SERPL-MCNC: 2.5 MG/DL — SIGNIFICANT CHANGE UP (ref 2.5–4.5)
PLATELET # BLD AUTO: 482 K/UL — HIGH (ref 150–400)
POTASSIUM SERPL-MCNC: 4.1 MMOL/L — SIGNIFICANT CHANGE UP (ref 3.5–5.3)
POTASSIUM SERPL-SCNC: 4.1 MMOL/L — SIGNIFICANT CHANGE UP (ref 3.5–5.3)
RBC # BLD: 5.61 M/UL — SIGNIFICANT CHANGE UP (ref 4.2–5.8)
RBC # FLD: 15 % — HIGH (ref 10.3–14.5)
SODIUM SERPL-SCNC: 132 MMOL/L — LOW (ref 135–145)
SPECIMEN SOURCE: SIGNIFICANT CHANGE UP
SPECIMEN SOURCE: SIGNIFICANT CHANGE UP
VANCOMYCIN TROUGH SERPL-MCNC: 19 UG/ML — SIGNIFICANT CHANGE UP (ref 10–20)
WBC # BLD: 9.73 K/UL — SIGNIFICANT CHANGE UP (ref 3.8–10.5)
WBC # FLD AUTO: 9.73 K/UL — SIGNIFICANT CHANGE UP (ref 3.8–10.5)

## 2019-10-10 PROCEDURE — 86038 ANTINUCLEAR ANTIBODIES: CPT

## 2019-10-10 PROCEDURE — 82330 ASSAY OF CALCIUM: CPT

## 2019-10-10 PROCEDURE — 84295 ASSAY OF SERUM SODIUM: CPT

## 2019-10-10 PROCEDURE — 80048 BASIC METABOLIC PNL TOTAL CA: CPT

## 2019-10-10 PROCEDURE — 82150 ASSAY OF AMYLASE: CPT

## 2019-10-10 PROCEDURE — 86618 LYME DISEASE ANTIBODY: CPT

## 2019-10-10 PROCEDURE — 99239 HOSP IP/OBS DSCHRG MGMT >30: CPT | Mod: GC

## 2019-10-10 PROCEDURE — 85014 HEMATOCRIT: CPT

## 2019-10-10 PROCEDURE — 96365 THER/PROPH/DIAG IV INF INIT: CPT | Mod: XU

## 2019-10-10 PROCEDURE — 93005 ELECTROCARDIOGRAM TRACING: CPT

## 2019-10-10 PROCEDURE — 36569 INSJ PICC 5 YR+ W/O IMAGING: CPT

## 2019-10-10 PROCEDURE — 96361 HYDRATE IV INFUSION ADD-ON: CPT | Mod: XU

## 2019-10-10 PROCEDURE — 83605 ASSAY OF LACTIC ACID: CPT

## 2019-10-10 PROCEDURE — 80202 ASSAY OF VANCOMYCIN: CPT

## 2019-10-10 PROCEDURE — 87075 CULTR BACTERIA EXCEPT BLOOD: CPT

## 2019-10-10 PROCEDURE — 87086 URINE CULTURE/COLONY COUNT: CPT

## 2019-10-10 PROCEDURE — 83735 ASSAY OF MAGNESIUM: CPT

## 2019-10-10 PROCEDURE — 99285 EMERGENCY DEPT VISIT HI MDM: CPT | Mod: 25

## 2019-10-10 PROCEDURE — 85027 COMPLETE CBC AUTOMATED: CPT

## 2019-10-10 PROCEDURE — 81001 URINALYSIS AUTO W/SCOPE: CPT

## 2019-10-10 PROCEDURE — 82947 ASSAY GLUCOSE BLOOD QUANT: CPT

## 2019-10-10 PROCEDURE — 82803 BLOOD GASES ANY COMBINATION: CPT

## 2019-10-10 PROCEDURE — 84100 ASSAY OF PHOSPHORUS: CPT

## 2019-10-10 PROCEDURE — 87040 BLOOD CULTURE FOR BACTERIA: CPT

## 2019-10-10 PROCEDURE — 84132 ASSAY OF SERUM POTASSIUM: CPT

## 2019-10-10 PROCEDURE — 80053 COMPREHEN METABOLIC PANEL: CPT

## 2019-10-10 PROCEDURE — 85610 PROTHROMBIN TIME: CPT

## 2019-10-10 PROCEDURE — 87529 HSV DNA AMP PROBE: CPT

## 2019-10-10 PROCEDURE — C1751: CPT

## 2019-10-10 PROCEDURE — 70487 CT MAXILLOFACIAL W/DYE: CPT

## 2019-10-10 PROCEDURE — 71045 X-RAY EXAM CHEST 1 VIEW: CPT

## 2019-10-10 PROCEDURE — 87070 CULTURE OTHR SPECIMN AEROBIC: CPT

## 2019-10-10 PROCEDURE — 86431 RHEUMATOID FACTOR QUANT: CPT

## 2019-10-10 PROCEDURE — 85730 THROMBOPLASTIN TIME PARTIAL: CPT

## 2019-10-10 PROCEDURE — 87186 SC STD MICRODIL/AGAR DIL: CPT

## 2019-10-10 PROCEDURE — 86200 CCP ANTIBODY: CPT

## 2019-10-10 PROCEDURE — 82435 ASSAY OF BLOOD CHLORIDE: CPT

## 2019-10-10 PROCEDURE — 87205 SMEAR GRAM STAIN: CPT

## 2019-10-10 PROCEDURE — 87798 DETECT AGENT NOS DNA AMP: CPT

## 2019-10-10 PROCEDURE — 87389 HIV-1 AG W/HIV-1&-2 AB AG IA: CPT

## 2019-10-10 PROCEDURE — 87150 DNA/RNA AMPLIFIED PROBE: CPT

## 2019-10-10 PROCEDURE — 86225 DNA ANTIBODY NATIVE: CPT

## 2019-10-10 PROCEDURE — 93306 TTE W/DOPPLER COMPLETE: CPT

## 2019-10-10 RX ORDER — AMLODIPINE BESYLATE AND BENAZEPRIL HYDROCHLORIDE 10; 20 MG/1; MG/1
1 CAPSULE ORAL
Qty: 0 | Refills: 0 | DISCHARGE

## 2019-10-10 RX ORDER — AMLODIPINE BESYLATE 2.5 MG/1
1 TABLET ORAL
Qty: 30 | Refills: 0
Start: 2019-10-10 | End: 2019-11-08

## 2019-10-10 RX ORDER — VANCOMYCIN HCL 1 G
1 VIAL (EA) INTRAVENOUS
Qty: 28 | Refills: 0
Start: 2019-10-10 | End: 2019-11-06

## 2019-10-10 RX ADMIN — CHLORHEXIDINE GLUCONATE 1 APPLICATION(S): 213 SOLUTION TOPICAL at 09:02

## 2019-10-10 RX ADMIN — Medication 250 MILLIGRAM(S): at 10:50

## 2019-10-10 RX ADMIN — Medication 250 MILLIGRAM(S): at 02:23

## 2019-10-10 RX ADMIN — Medication 145 MILLIGRAM(S): at 12:53

## 2019-10-10 RX ADMIN — Medication 0.1 MILLIGRAM(S): at 09:02

## 2019-10-10 RX ADMIN — AMLODIPINE BESYLATE 10 MILLIGRAM(S): 2.5 TABLET ORAL at 09:02

## 2019-10-10 NOTE — PROGRESS NOTE ADULT - REASON FOR ADMISSION
Cellulitis

## 2019-10-10 NOTE — PROGRESS NOTE ADULT - PROVIDER SPECIALTY LIST ADULT
Dental
Infectious Disease
Internal Medicine

## 2019-10-10 NOTE — PROGRESS NOTE ADULT - PROBLEM SELECTOR PROBLEM 1
Sepsis due to cellulitis
MRSA bacteremia
Sepsis due to cellulitis

## 2019-10-10 NOTE — PROGRESS NOTE ADULT - PROBLEM SELECTOR PLAN 1
- Now resolved, BCx from 10/10 NGTD  - PICC placed 10/8 and confirmed in correct position  - Vanc 1g q8; trough pre 4th dose returned as 19, will d/c with this dose

## 2019-10-10 NOTE — PROGRESS NOTE ADULT - PROBLEM SELECTOR PLAN 2
S/p plastics I&D on 10/3. Abscess culture showed few MRSA.   - Per ID recs, beginning 1g vanc q8 10/9 AM  - Vanc 1g q8; trough pre 4th dose returned as 19, will d/c with this dose  - per plastics, f/u outpatient end of this week

## 2019-10-10 NOTE — PROGRESS NOTE ADULT - SUBJECTIVE AND OBJECTIVE BOX
ALPHONSO MORRISSEY  44y Male    Medicine Team 2  Iona Delatorre MS4  Pager [phone]    INTERVAL HPI/OVERNIGHT EVENTS: Pt has no overnight events. He states that the discomfort in his lip is progressively decreasing and rates it as a 1-2/10 in severity today. He is eating and sleeping well. He is able to tolerate a regular diet. He reports having regular bowel movements. He denies any fever, chills, headaches, nausea, vomiting, diarrhea, or dysuria. He reports increasing his water intake to aid his kidneys while on vancomycin.      REVIEW OF SYSTEMS:  CONSTITUTIONAL: no fever overnight, weight loss, or fatigue  EYES: No eye pain, visual disturbances, or discharge  ENMT:  No difficulty hearing, tinnitus, vertigo; No sinus or throat pain, +swollen lip described as a "discomfort" and "pressure" progressively decreasing in discomfort   NECK: No pain or stiffness  RESPIRATORY: No cough or wheezing; No shortness of breath  CARDIOVASCULAR: No chest pain, palpitations, dizziness, or leg swelling  GASTROINTESTINAL: No abdominal or epigastric pain. No diarrhea or constipation.  GENITOURINARY: No dysuria, frequency, hematuria, or incontinence  NEUROLOGICAL: No headaches, memory loss, loss of strength, numbness, or tremors  SKIN: No itching, burning, rashes, or lesions   MUSCULOSKELETAL: No joint pain or swelling; No muscle, back, or extremity pain; +PICC line in right arm w/o surrounding erythema or tenderness  ALLERGY AND IMMUNOLOGIC: No hives or eczema    FAMILY HISTORY:  Mother had history of swollen lip   Father had history of HTN     Vital Signs Last 24 Hrs  T(C): 36.7 (10 Oct 2019 09:11), Max: 36.9 (10 Oct 2019 08:59)  T(F): 98 (10 Oct 2019 09:11), Max: 98.5 (10 Oct 2019 08:59)  HR: 90 (10 Oct 2019 09:11) (69 - 100)  BP: 138/97 (10 Oct 2019 09:11) (123/79 - 145/93)  BP(mean): --  RR: 18 (10 Oct 2019 09:11) (18 - 18)  SpO2: 98% (10 Oct 2019 09:11) (98% - 98%)      PHYSICAL EXAM:  GENERAL: NAD, well-groomed, well-developed  HEAD:  Atraumatic, Normocephalic  EYES: EOMI, conjunctiva and sclera clear  ENMT: Moist mucous membranes, +missing upper and lower teeth on the sides, +swollen upper lip mainly on the left spreading slightly past midline that is dark in color with some crusting in the center of the lesion, mildly decreased in swelling as compared to yesterday's exam, lip softer and more pliable than on previous examinations  NECK: Supple, No JVD  NERVOUS SYSTEM:  Alert & Oriented X3, Good concentration; Motor Strength 5/5 B/L upper and lower extremities  CHEST/LUNG: Clear to percussion bilaterally; No rales, rhonchi, wheezing, or rubs  HEART: Regular rate and rhythm  ABDOMEN: Soft, Nontender, Nondistended; Bowel sounds present  EXTREMITIES:  2+ Peripheral Pulses, No clubbing, cyanosis, or edema  LYMPH: No lymphadenopathy noted  SKIN: No rashes or lesions    Consultant(s) Notes Reviewed:  [x ] YES  [ ] NO  Care Discussed with Consultants/Other Providers [ x] YES  [ ] NO    LABS:                        10.7   9.73  )-----------( 482      ( 10 Oct 2019 08:59 )             34.4     10-10    132<L>  |  98  |  18  ----------------------------<  86  4.1   |  24  |  1.19    Ca    8.8      10 Oct 2019 07:16  Phos  2.5     10-10  Mg     2.3     10-10        9/30 23:10 Urine Cultures: wnl    9/30 23:07 Blood Cultures:   - Growth in anaerobic bottle: gram + cocci in pairs  - Growth in aerobic bottle: gram + cocci in clusters  - Gram Stain: MRSA    10/1 01:01 Blood Cultures:  - Growth in anaerobic bottle: gram + cocci in clusters  - Growth in aerobic bottle: gram + cocci in clusters    -MRSA susceptible to vancomycin  - HSV not detected on lesion  - dsDNA, CCP, and anti nuclear factor titer wnl    10/3 13:25 Vanc Trough from 1.25g q12: 9.6    10/3 10:12 Blood Cultures: No growth to date at 5 days on 10/8    10/3 16:35 Abscess of Upper Lip Culture: Few Staph Aureus    10/3 16:35 Abscess of Upper Lip Culture: Few MRSA    10/5 4:47 Vanc Trough from 1.5g q12: 12    10/5 9:00 Blood Cultures: no growth at 5 days on 10/10    10/7 4:50 Vanc Trough from 1.75g q12: Vanc tough 20.2    10/8 13:23 Vanc Trough from 1.25g q8: Vanc trough 22.7    10/10 9:23 Vanc Trough from 1g q8: Vanc trough 19    RADIOLOGY & ADDITIONAL TESTS:  EKG 9/30 20:57  Ventricular Rate 104 BPM, Atrial Rate 104 BPM, P-R Interval 170 ms, QRS Duration 78 ms, Q-T Interval 340 ms, QTC Calculation(Bezet) 447 ms, P Axis 38 degrees, R Axis 4 degrees, T Axis 21 degrees, Diagnosis Line SINUS TACHYCARDIA, MINIMAL VOLTAGE CRITERIA FOR LVH, MAY BE NORMAL VARIANT, BORDERLINE ECG    CT Maxillofacial w/ IV contrast 9/30 21:23  IMPRESSION:    Mild left facial soft tissue stranding, most pronounced the level of the left mandible and extending anterior to masseter muscle, concerning for cellulitis.   No discrete rim-enhancing collection identified to suggest abscess at this time. Dental hardware generates beam hardening artifact which obscures detailed evaluation.  Tiny punctate bubble of air adjacent to the left mandibular periosteum. Consider oral maxillofacial surgical consultation if there is concern for dental infection.    TTE 10/3 10:52:  Conclusions:  1. Normal mitral valve. Minimal mitral regurgitation.  2. Normal trileaflet aortic valve.  3. Normal left ventricular systolic function. No segmental wall motion abnormalities.  4. Normal right ventricular size and function.  ***Unable to exclude endocarditis on this study. Consider [facility] if clinically indicated.  *** No previous Echo exam.    CXR 10/8 19:54 Preliminary Read Interpretation: right sided PICC with the tip in the SVC    Imaging Personally Reviewed:  [X] YES  [ ] NO    MEDICATIONS  (STANDING):  amLODIPine   Tablet 10 milliGRAM(s) Oral daily  chlorhexidine 2% Cloths 1 Application(s) Topical daily  cloNIDine 0.1 milliGRAM(s) Oral two times a day  fenofibrate Tablet 145 milliGRAM(s) Oral daily  influenza   Vaccine 0.5 milliLiter(s) IntraMuscular once  vancomycin  IVPB 1000 milliGRAM(s) IV Intermittent every 8 hours    MEDICATIONS  (PRN):  acetaminophen   Tablet .. 650 milliGRAM(s) Oral every 6 hours PRN Temp greater or equal to 38C (100.4F)

## 2019-10-31 PROBLEM — Z00.00 ENCOUNTER FOR PREVENTIVE HEALTH EXAMINATION: Status: ACTIVE | Noted: 2019-10-31

## 2019-10-31 PROBLEM — J45.909 UNSPECIFIED ASTHMA, UNCOMPLICATED: Chronic | Status: ACTIVE | Noted: 2019-09-30

## 2019-10-31 PROBLEM — I10 ESSENTIAL (PRIMARY) HYPERTENSION: Chronic | Status: ACTIVE | Noted: 2019-09-30

## 2019-11-05 ENCOUNTER — APPOINTMENT (OUTPATIENT)
Dept: INFECTIOUS DISEASE | Facility: CLINIC | Age: 44
End: 2019-11-05
Payer: COMMERCIAL

## 2019-11-05 VITALS
OXYGEN SATURATION: 98 % | TEMPERATURE: 98 F | WEIGHT: 165 LBS | SYSTOLIC BLOOD PRESSURE: 139 MMHG | HEART RATE: 98 BPM | HEIGHT: 70 IN | DIASTOLIC BLOOD PRESSURE: 99 MMHG | BODY MASS INDEX: 23.62 KG/M2 | RESPIRATION RATE: 18 BRPM

## 2019-11-05 DIAGNOSIS — I10 ESSENTIAL (PRIMARY) HYPERTENSION: ICD-10-CM

## 2019-11-05 DIAGNOSIS — K13.0 DISEASES OF LIPS: ICD-10-CM

## 2019-11-05 DIAGNOSIS — Z09 ENCOUNTER FOR FOLLOW-UP EXAMINATION AFTER COMPLETED TREATMENT FOR CONDITIONS OTHER THAN MALIGNANT NEOPLASM: ICD-10-CM

## 2019-11-05 DIAGNOSIS — R78.81 BACTEREMIA: ICD-10-CM

## 2019-11-05 PROCEDURE — 99214 OFFICE O/P EST MOD 30 MIN: CPT

## 2019-11-05 RX ORDER — FENOFIBRATE 120 MG/1
TABLET ORAL
Refills: 0 | Status: ACTIVE | COMMUNITY

## 2019-11-05 RX ORDER — CLONIDINE HYDROCHLORIDE 0.3 MG/1
TABLET ORAL
Refills: 0 | Status: ACTIVE | COMMUNITY

## 2019-11-06 PROBLEM — R78.81 MRSA BACTEREMIA: Status: ACTIVE | Noted: 2019-11-06

## 2019-11-06 NOTE — ASSESSMENT
[FreeTextEntry1] : 43 y/o male with mrsa bacteremia/lip cellulitis comes for followup.\par \par S/p course of iv abx. Doing well. \par \par No more abx needed at this time. \par \par Script given for surveillance blood cultures to be done next week. \par \par Norvasc refilled for HTN. Advised to see PCP for followup. \par

## 2019-11-06 NOTE — HISTORY OF PRESENT ILLNESS
[FreeTextEntry1] : 43 y/o male comes for post hospital visit for  MRSA bacteremia/lip cellulitis. \par \par S/p course of 4 weeks iv abx with PICC. PICC removed. \par \par Doing well. \par \par Lip area healed. \par \par No fever. No nausea, vomiting, diarrhea.

## 2019-11-06 NOTE — PHYSICAL EXAM
[General Appearance - Alert] : alert [General Appearance - In No Acute Distress] : in no acute distress [Sclera] : the sclera and conjunctiva were normal [PERRL With Normal Accommodation] : pupils were equal in size, round, reactive to light [Extraocular Movements] : extraocular movements were intact [Outer Ear] : the ears and nose were normal in appearance [Oropharynx] : the oropharynx was normal with no thrush [Neck Appearance] : the appearance of the neck was normal [Neck Cervical Mass (___cm)] : no neck mass was observed [Jugular Venous Distention Increased] : there was no jugular-venous distention [Thyroid Diffuse Enlargement] : the thyroid was not enlarged [Auscultation Breath Sounds / Voice Sounds] : lungs were clear to auscultation bilaterally [Heart Sounds] : normal S1 and S2 [Full Pulse] : the pedal pulses are present [Edema] : there was no peripheral edema [Bowel Sounds] : normal bowel sounds [Abdomen Soft] : soft [Abdomen Tenderness] : non-tender [Abdomen Mass (___ Cm)] : no abdominal mass palpated [Costovertebral Angle Tenderness] : no CVA tenderness [Musculoskeletal - Swelling] : no joint swelling [Nail Clubbing] : no clubbing  or cyanosis of the fingernails [Motor Tone] : muscle strength and tone were normal [Skin Color & Pigmentation] : normal skin color and pigmentation [] : no rash [Deep Tendon Reflexes (DTR)] : deep tendon reflexes were 2+ and symmetric [Sensation] : the sensory exam was normal to light touch and pinprick [No Focal Deficits] : no focal deficits [Oriented To Time, Place, And Person] : oriented to person, place, and time [Affect] : the affect was normal

## 2020-02-03 ENCOUNTER — RX RENEWAL (OUTPATIENT)
Age: 45
End: 2020-02-03

## 2020-05-07 ENCOUNTER — RX RENEWAL (OUTPATIENT)
Age: 45
End: 2020-05-07

## 2020-05-15 RX ORDER — AMLODIPINE BESYLATE 10 MG/1
10 TABLET ORAL DAILY
Qty: 90 | Refills: 0 | Status: ACTIVE | COMMUNITY
Start: 2020-02-03

## 2021-09-09 NOTE — PROGRESS NOTE ADULT - NEGATIVE MUSCULOSKELETAL SYMPTOMS
no back pain/no joint swelling Topical Sulfur Applications Pregnancy And Lactation Text: This medication is Pregnancy Category C and has an unknown safety profile during pregnancy. It is unknown if this topical medication is excreted in breast milk.

## 2022-08-19 NOTE — PROGRESS NOTE ADULT - PROBLEM SELECTOR PROBLEM 5
Need for prophylactic measure
Need for prophylactic measure
Acuity of illness
Leukocytosis

## 2022-09-26 NOTE — H&P ADULT - PROBLEM SELECTOR PLAN 4
DVT: Improve score 0, ambulate   Diet: Regular   Dispo: Home Airway patent, Nasal mucosa clear. Mouth with normal mucosa.

## 2024-12-16 NOTE — PROGRESS NOTE ADULT - ALLERGIC/IMMUNOLOGIC
Next appt 11/10/2025  Last appt 11/07/2024    Refill request for   Disp Refills Start End    esomeprazole (NexIUM) 40 MG capsule 30 capsule 8 3/13/2024 --    Sig - Route: Take 1 capsule by mouth daily (before breakfast). - Oral    Sent to pharmacy as: Esomeprazole Magnesium 40 MG Oral Capsule Delayed Release (NexIUM)    Class: Eprescribe        Refilled per standing med protocol.     details…

## 2025-03-04 NOTE — CONSULT NOTE ADULT - PROBLEM SELECTOR RECOMMENDATION 9
Previous Labs: No
How Did The Hair Loss Occur?: gradual in onset
How Severe Is Your Hair Loss?: mild
Additional History: Wants to discuss Nutrafol.
-suspect staph aureus  -?secondary infected cold sore/aphthous ulcer  -increase vancomycin 1.25 gm iv q12  -Unasyn 3 gm iv q6 for oral judy coverage  -dc zosyn   -acyclovir 370 mg iv q8 for HSV coverage  -dental input noted - no evidence of dental infection